# Patient Record
Sex: FEMALE | Race: WHITE | NOT HISPANIC OR LATINO
[De-identification: names, ages, dates, MRNs, and addresses within clinical notes are randomized per-mention and may not be internally consistent; named-entity substitution may affect disease eponyms.]

---

## 2017-10-05 ENCOUNTER — FORM ENCOUNTER (OUTPATIENT)
Age: 67
End: 2017-10-05

## 2017-10-05 ENCOUNTER — APPOINTMENT (OUTPATIENT)
Dept: GASTROENTEROLOGY | Facility: CLINIC | Age: 67
End: 2017-10-05
Payer: MEDICARE

## 2017-10-05 VITALS
DIASTOLIC BLOOD PRESSURE: 80 MMHG | WEIGHT: 127 LBS | BODY MASS INDEX: 23.98 KG/M2 | SYSTOLIC BLOOD PRESSURE: 140 MMHG | HEIGHT: 61 IN | TEMPERATURE: 99.6 F

## 2017-10-05 DIAGNOSIS — Z80.0 FAMILY HISTORY OF MALIGNANT NEOPLASM OF DIGESTIVE ORGANS: ICD-10-CM

## 2017-10-05 DIAGNOSIS — Z87.898 PERSONAL HISTORY OF OTHER SPECIFIED CONDITIONS: ICD-10-CM

## 2017-10-05 DIAGNOSIS — Z87.828 PERSONAL HISTORY OF OTHER (HEALED) PHYSICAL INJURY AND TRAUMA: ICD-10-CM

## 2017-10-05 PROCEDURE — 99202 OFFICE O/P NEW SF 15 MIN: CPT

## 2017-10-06 ENCOUNTER — OUTPATIENT (OUTPATIENT)
Dept: OUTPATIENT SERVICES | Facility: HOSPITAL | Age: 67
LOS: 1 days | End: 2017-10-06
Payer: MEDICARE

## 2017-10-06 ENCOUNTER — APPOINTMENT (OUTPATIENT)
Dept: ULTRASOUND IMAGING | Facility: CLINIC | Age: 67
End: 2017-10-06
Payer: MEDICARE

## 2017-10-06 ENCOUNTER — RESULT REVIEW (OUTPATIENT)
Age: 67
End: 2017-10-06

## 2017-10-06 DIAGNOSIS — R10.84 GENERALIZED ABDOMINAL PAIN: ICD-10-CM

## 2017-10-06 LAB
ALBUMIN SERPL ELPH-MCNC: 3.9 G/DL
ALP BLD-CCNC: 71 U/L
ALT SERPL-CCNC: 6 U/L
AMYLASE/CREAT SERPL: 37 U/L
ANION GAP SERPL CALC-SCNC: 16 MMOL/L
AST SERPL-CCNC: 17 U/L
BILIRUB SERPL-MCNC: 0.4 MG/DL
BUN SERPL-MCNC: 7 MG/DL
CALCIUM SERPL-MCNC: 9.3 MG/DL
CHLORIDE SERPL-SCNC: 98 MMOL/L
CO2 SERPL-SCNC: 24 MMOL/L
CREAT SERPL-MCNC: 0.63 MG/DL
CRP SERPL-MCNC: 5.2 MG/DL
ERYTHROCYTE [SEDIMENTATION RATE] IN BLOOD BY WESTERGREN METHOD: 25 MM/HR
GLIADIN IGA SER QL: 6.5 UNITS
GLIADIN IGG SER QL: <5 UNITS
GLIADIN PEPTIDE IGA SER-ACNC: NEGATIVE
GLIADIN PEPTIDE IGG SER-ACNC: NEGATIVE
GLUCOSE SERPL-MCNC: 82 MG/DL
IGA SER QL IEP: 189 MG/DL
LPL SERPL-CCNC: 9 U/L
POTASSIUM SERPL-SCNC: 4 MMOL/L
PROT SERPL-MCNC: 7.3 G/DL
SODIUM SERPL-SCNC: 138 MMOL/L
TTG IGA SER IA-ACNC: 8.8 UNITS
TTG IGA SER-ACNC: NEGATIVE
TTG IGG SER IA-ACNC: <5 UNITS
TTG IGG SER IA-ACNC: NEGATIVE

## 2017-10-06 PROCEDURE — 76700 US EXAM ABDOM COMPLETE: CPT | Mod: 26

## 2017-10-06 PROCEDURE — 76700 US EXAM ABDOM COMPLETE: CPT

## 2017-10-09 LAB
ENDOMYSIUM IGA SER QL: NEGATIVE
ENDOMYSIUM IGA TITR SER: NORMAL

## 2017-10-12 ENCOUNTER — OTHER (OUTPATIENT)
Age: 67
End: 2017-10-12

## 2017-10-13 ENCOUNTER — OTHER (OUTPATIENT)
Age: 67
End: 2017-10-13

## 2017-10-18 ENCOUNTER — APPOINTMENT (OUTPATIENT)
Dept: GASTROENTEROLOGY | Facility: CLINIC | Age: 67
End: 2017-10-18
Payer: MEDICARE

## 2017-10-18 ENCOUNTER — OTHER (OUTPATIENT)
Age: 67
End: 2017-10-18

## 2017-10-18 VITALS
BODY MASS INDEX: 23.98 KG/M2 | OXYGEN SATURATION: 98 % | SYSTOLIC BLOOD PRESSURE: 122 MMHG | WEIGHT: 127 LBS | HEIGHT: 61 IN | DIASTOLIC BLOOD PRESSURE: 80 MMHG | HEART RATE: 84 BPM

## 2017-10-18 DIAGNOSIS — R79.82 ELEVATED C-REACTIVE PROTEIN (CRP): ICD-10-CM

## 2017-10-18 DIAGNOSIS — K64.4 RESIDUAL HEMORRHOIDAL SKIN TAGS: ICD-10-CM

## 2017-10-18 PROCEDURE — 99214 OFFICE O/P EST MOD 30 MIN: CPT

## 2017-10-18 RX ORDER — ALUMINUM HYDROXIDE AND MAGNESIUM CARBONATE 160; 105 MG/1; MG/1
160-105 TABLET, CHEWABLE ORAL
Qty: 100 | Refills: 5 | Status: DISCONTINUED | COMMUNITY
Start: 2017-10-05 | End: 2017-10-18

## 2017-10-18 RX ORDER — DICYCLOMINE HYDROCHLORIDE 10 MG/1
10 CAPSULE ORAL
Qty: 20 | Refills: 0 | Status: DISCONTINUED | COMMUNITY
Start: 2017-10-02 | End: 2017-10-18

## 2017-10-18 RX ORDER — PRAVASTATIN SODIUM 40 MG/1
40 TABLET ORAL
Refills: 0 | Status: DISCONTINUED | COMMUNITY
End: 2017-10-18

## 2017-10-18 RX ORDER — PRAVASTATIN SODIUM 40 MG/1
40 TABLET ORAL
Qty: 90 | Refills: 0 | Status: DISCONTINUED | COMMUNITY
Start: 2017-05-25 | End: 2017-10-18

## 2017-10-18 RX ORDER — HYDROCORTISONE 25 MG/G
2.5 OINTMENT TOPICAL
Qty: 2835 | Refills: 0 | Status: DISCONTINUED | COMMUNITY
Start: 2017-07-25 | End: 2017-10-18

## 2017-10-19 ENCOUNTER — APPOINTMENT (OUTPATIENT)
Dept: GASTROENTEROLOGY | Facility: AMBULATORY MEDICAL SERVICES | Age: 67
End: 2017-10-19

## 2017-10-23 ENCOUNTER — OTHER (OUTPATIENT)
Age: 67
End: 2017-10-23

## 2017-11-07 ENCOUNTER — OTHER (OUTPATIENT)
Age: 67
End: 2017-11-07

## 2017-11-09 ENCOUNTER — APPOINTMENT (OUTPATIENT)
Dept: GASTROENTEROLOGY | Facility: AMBULATORY MEDICAL SERVICES | Age: 67
End: 2017-11-09

## 2018-08-08 ENCOUNTER — APPOINTMENT (OUTPATIENT)
Dept: GASTROENTEROLOGY | Facility: CLINIC | Age: 68
End: 2018-08-08
Payer: MEDICARE

## 2018-08-08 VITALS
HEART RATE: 66 BPM | WEIGHT: 142 LBS | DIASTOLIC BLOOD PRESSURE: 73 MMHG | HEIGHT: 61 IN | SYSTOLIC BLOOD PRESSURE: 130 MMHG | BODY MASS INDEX: 26.81 KG/M2

## 2018-08-08 PROCEDURE — 99215 OFFICE O/P EST HI 40 MIN: CPT

## 2018-08-09 ENCOUNTER — OTHER (OUTPATIENT)
Age: 68
End: 2018-08-09

## 2018-08-09 ENCOUNTER — APPOINTMENT (OUTPATIENT)
Dept: UROLOGY | Facility: CLINIC | Age: 68
End: 2018-08-09

## 2018-08-09 ENCOUNTER — APPOINTMENT (OUTPATIENT)
Dept: UROLOGY | Facility: CLINIC | Age: 68
End: 2018-08-09
Payer: MEDICARE

## 2018-08-09 DIAGNOSIS — R35.0 FREQUENCY OF MICTURITION: ICD-10-CM

## 2018-08-09 DIAGNOSIS — Z87.440 PERSONAL HISTORY OF URINARY (TRACT) INFECTIONS: ICD-10-CM

## 2018-08-09 LAB
ALBUMIN SERPL ELPH-MCNC: 4.4 G/DL
ALP BLD-CCNC: 74 U/L
ALT SERPL-CCNC: 10 U/L
AMYLASE/CREAT SERPL: 38 U/L
ANION GAP SERPL CALC-SCNC: 19 MMOL/L
AST SERPL-CCNC: 19 U/L
BASOPHILS # BLD AUTO: 0.04 K/UL
BASOPHILS NFR BLD AUTO: 0.4 %
BILIRUB SERPL-MCNC: 0.4 MG/DL
BUN SERPL-MCNC: 9 MG/DL
CALCIUM SERPL-MCNC: 9 MG/DL
CANCER AG19-9 SERPL-ACNC: 21.4 U/ML
CHLORIDE SERPL-SCNC: 92 MMOL/L
CO2 SERPL-SCNC: 21 MMOL/L
CREAT SERPL-MCNC: 0.6 MG/DL
EOSINOPHIL # BLD AUTO: 0.07 K/UL
EOSINOPHIL NFR BLD AUTO: 0.8 %
GLUCOSE SERPL-MCNC: 36 MG/DL
HCT VFR BLD CALC: 39.3 %
HGB BLD-MCNC: 13.1 G/DL
IMM GRANULOCYTES NFR BLD AUTO: 0.1 %
LPL SERPL-CCNC: 10 U/L
LYMPHOCYTES # BLD AUTO: 1.58 K/UL
LYMPHOCYTES NFR BLD AUTO: 17.5 %
MAN DIFF?: NORMAL
MCHC RBC-ENTMCNC: 30.2 PG
MCHC RBC-ENTMCNC: 33.3 GM/DL
MCV RBC AUTO: 90.6 FL
MONOCYTES # BLD AUTO: 0.73 K/UL
MONOCYTES NFR BLD AUTO: 8.1 %
NEUTROPHILS # BLD AUTO: 6.6 K/UL
NEUTROPHILS NFR BLD AUTO: 73.1 %
PLATELET # BLD AUTO: 370 K/UL
POTASSIUM SERPL-SCNC: 4.4 MMOL/L
PROT SERPL-MCNC: 6.6 G/DL
RBC # BLD: 4.34 M/UL
RBC # FLD: 13.4 %
SODIUM SERPL-SCNC: 132 MMOL/L
WBC # FLD AUTO: 9.03 K/UL

## 2018-08-09 PROCEDURE — 99203 OFFICE O/P NEW LOW 30 MIN: CPT | Mod: 25

## 2018-08-09 PROCEDURE — 51798 US URINE CAPACITY MEASURE: CPT

## 2018-08-09 PROCEDURE — 81003 URINALYSIS AUTO W/O SCOPE: CPT | Mod: QW

## 2018-08-10 VITALS
HEIGHT: 61 IN | RESPIRATION RATE: 18 BRPM | BODY MASS INDEX: 25.11 KG/M2 | HEART RATE: 72 BPM | WEIGHT: 133 LBS | DIASTOLIC BLOOD PRESSURE: 70 MMHG | SYSTOLIC BLOOD PRESSURE: 112 MMHG

## 2018-08-13 ENCOUNTER — RX RENEWAL (OUTPATIENT)
Age: 68
End: 2018-08-13

## 2018-08-13 ENCOUNTER — OTHER (OUTPATIENT)
Age: 68
End: 2018-08-13

## 2018-08-13 DIAGNOSIS — F41.9 ANXIETY DISORDER, UNSPECIFIED: ICD-10-CM

## 2018-08-13 LAB — BACTERIA UR CULT: NORMAL

## 2018-08-24 ENCOUNTER — APPOINTMENT (OUTPATIENT)
Dept: GASTROENTEROLOGY | Facility: CLINIC | Age: 68
End: 2018-08-24
Payer: MEDICARE

## 2018-08-24 VITALS
SYSTOLIC BLOOD PRESSURE: 140 MMHG | WEIGHT: 130 LBS | HEART RATE: 68 BPM | DIASTOLIC BLOOD PRESSURE: 80 MMHG | OXYGEN SATURATION: 98 % | TEMPERATURE: 98.5 F | HEIGHT: 61 IN | BODY MASS INDEX: 24.55 KG/M2

## 2018-08-24 DIAGNOSIS — R10.32 LEFT LOWER QUADRANT PAIN: ICD-10-CM

## 2018-08-24 DIAGNOSIS — R11.0 NAUSEA: ICD-10-CM

## 2018-08-24 PROCEDURE — 99214 OFFICE O/P EST MOD 30 MIN: CPT

## 2018-09-28 ENCOUNTER — APPOINTMENT (OUTPATIENT)
Dept: GASTROENTEROLOGY | Facility: HOSPITAL | Age: 68
End: 2018-09-28

## 2018-09-28 ENCOUNTER — OTHER (OUTPATIENT)
Age: 68
End: 2018-09-28

## 2019-01-24 ENCOUNTER — OTHER (OUTPATIENT)
Age: 69
End: 2019-01-24

## 2019-01-25 ENCOUNTER — OTHER (OUTPATIENT)
Age: 69
End: 2019-01-25

## 2019-01-25 ENCOUNTER — APPOINTMENT (OUTPATIENT)
Dept: GASTROENTEROLOGY | Facility: CLINIC | Age: 69
End: 2019-01-25
Payer: MEDICARE

## 2019-01-25 VITALS
BODY MASS INDEX: 25.68 KG/M2 | WEIGHT: 136 LBS | TEMPERATURE: 98.8 F | SYSTOLIC BLOOD PRESSURE: 130 MMHG | HEART RATE: 76 BPM | OXYGEN SATURATION: 98 % | HEIGHT: 61 IN | DIASTOLIC BLOOD PRESSURE: 80 MMHG

## 2019-01-25 DIAGNOSIS — R14.2 ERUCTATION: ICD-10-CM

## 2019-01-25 DIAGNOSIS — R10.84 GENERALIZED ABDOMINAL PAIN: ICD-10-CM

## 2019-01-25 PROBLEM — R10.32 LEFT LOWER QUADRANT PAIN: Status: RESOLVED | Noted: 2018-08-08 | Resolved: 2019-01-25

## 2019-01-25 PROCEDURE — 99214 OFFICE O/P EST MOD 30 MIN: CPT

## 2019-01-25 NOTE — ASSESSMENT
[FreeTextEntry1] : Impression\par \par Lower abdominal discomfort, left more than right lower quadrant\par \par Mild tenderness without re re down or guarding of the left lower quadrant\par \par History of diverticulitis\par \par Chronic constipation is seen she getting worse with hard stools small in size\par \par Abdominal bloating\par \par Limited colonoscopy or upper endoscopy\par \par Suggest\par \par Blood work here today\par \par Augmentin b.i.d.\par \par CAT scan of the abdomen and pelvis with IV and oral contrast\par \par In 6-8 weeks when we'll do upper endoscopy and colonoscopy as previously suggested\par \par Call me any time\par \par Go to the emergency room if needed\par \par Return to the office anytime\par \par Impression\par \par Lower abdominal discomfort, left more than right lower quadrant\par \par Mild tenderness without re re down or guarding of the left lower quadrant\par \par History of diverticulitis\par \par Chronic constipation is seen she getting worse with hard stools small in size\par \par Abdominal bloating\par \par Limited colonoscopy or upper endoscopy\par \par Suggest\par \par Blood work here today\par \par Augmentin b.i.d.\par \par CAT scan of the abdomen and pelvis with IV and oral contrast\par \par In 6-8 weeks when we'll do upper endoscopy and colonoscopy as previously suggested\par \par Call me any time\par \par Go to the emergency room if needed\par \par Return to the office anytime\par \par For now,\par \par Keep your diet very light\par \par Stay well hydrated\par \par Eat starchy starchy foods such as potatoes, rice, pasta\par \par Eat very little meat\par \par It is okay to have chicken and fish\par \par Avoid fresh fruits and vegetables for now\par \par I would suggest taking Augmentin one tablet 2 times a day\par \par Her blood work here today\par \par Schedule upper endoscopy and colonoscopy in about 6-8 weeks\par \par Call me or return anytime sooner as needed

## 2019-01-25 NOTE — CONSULT LETTER
[FreeTextEntry2] : Javad Concepcion DO\par [address] [facility]\[place] [address] \par \par  [FreeTextEntry3] : Pietro Choudhary MD\par

## 2019-01-25 NOTE — REASON FOR VISIT
[FreeTextEntry1] : Lower abdominal discomfort in the left lower quadrant with constipation, previous diverticulitis and fairly chronic bloating

## 2019-01-25 NOTE — PHYSICAL EXAM
[Occult Blood Positive] : stool was negative for occult blood [FreeTextEntry1] : Small external hemorrhoid, nontender, no blood at the 9:00 position, and a

## 2019-01-25 NOTE — HISTORY OF PRESENT ILLNESS
[de-identified] : Dr. Farnsworth takes care of this very pleasant 68-year-old female. She had an episode of blood in about 6 or 8 months ago. She was treated with outpatient Augmentin and did well. She had a CAT scan showing a diverticulitis at that time. There was a mild case with no complication. She never got to do colonoscopy as followup. Normal upper endoscopy. She has recurrent left lower quadrant discomfort more than right lower quadrant discomfort over the last several weeks. There is no fever or chills. She's having progressive constipation. MiraLax doesn't seem to work as well. As no blood per rectum. Her stools are coming out small and hard. There is no thinning of stool a flattening of stool. There's no family history of rectal of colon cancer. She has bloating and discomfort in the epigastric area at times. No fever or chills. No weight loss.

## 2019-01-26 LAB
BASOPHILS # BLD AUTO: 0.02 K/UL
BASOPHILS NFR BLD AUTO: 0.3 %
EOSINOPHIL # BLD AUTO: 0.02 K/UL
EOSINOPHIL NFR BLD AUTO: 0.3 %
FERRITIN SERPL-MCNC: 116 NG/ML
HCT VFR BLD CALC: 39.6 %
HGB BLD-MCNC: 12.9 G/DL
IMM GRANULOCYTES NFR BLD AUTO: 0.3 %
LYMPHOCYTES # BLD AUTO: 1.22 K/UL
LYMPHOCYTES NFR BLD AUTO: 18.7 %
MAN DIFF?: NORMAL
MCHC RBC-ENTMCNC: 29.4 PG
MCHC RBC-ENTMCNC: 32.6 GM/DL
MCV RBC AUTO: 90.2 FL
MONOCYTES # BLD AUTO: 0.36 K/UL
MONOCYTES NFR BLD AUTO: 5.5 %
NEUTROPHILS # BLD AUTO: 4.88 K/UL
NEUTROPHILS NFR BLD AUTO: 74.9 %
PLATELET # BLD AUTO: 366 K/UL
RBC # BLD: 4.39 M/UL
RBC # FLD: 13.8 %
WBC # FLD AUTO: 6.52 K/UL

## 2019-01-28 ENCOUNTER — OTHER (OUTPATIENT)
Age: 69
End: 2019-01-28

## 2019-01-28 LAB
ALBUMIN SERPL ELPH-MCNC: 4.5 G/DL
ALP BLD-CCNC: 72 U/L
ALT SERPL-CCNC: 15 U/L
ANION GAP SERPL CALC-SCNC: 17 MMOL/L
AST SERPL-CCNC: 17 U/L
BILIRUB SERPL-MCNC: 0.4 MG/DL
BUN SERPL-MCNC: 9 MG/DL
CALCIUM SERPL-MCNC: 9.3 MG/DL
CHLORIDE SERPL-SCNC: 101 MMOL/L
CO2 SERPL-SCNC: 20 MMOL/L
CREAT SERPL-MCNC: 0.58 MG/DL
GLUCOSE SERPL-MCNC: 91 MG/DL
IRON SATN MFR SERPL: 20 %
IRON SERPL-MCNC: 71 UG/DL
POTASSIUM SERPL-SCNC: 4.6 MMOL/L
PROT SERPL-MCNC: 6.7 G/DL
SODIUM SERPL-SCNC: 138 MMOL/L
TIBC SERPL-MCNC: 352 UG/DL
UIBC SERPL-MCNC: 281 UG/DL

## 2019-04-04 ENCOUNTER — OTHER (OUTPATIENT)
Age: 69
End: 2019-04-04

## 2019-04-12 ENCOUNTER — APPOINTMENT (OUTPATIENT)
Dept: GASTROENTEROLOGY | Facility: AMBULATORY MEDICAL SERVICES | Age: 69
End: 2019-04-12

## 2019-04-23 ENCOUNTER — OTHER (OUTPATIENT)
Age: 69
End: 2019-04-23

## 2019-04-25 ENCOUNTER — OTHER (OUTPATIENT)
Age: 69
End: 2019-04-25

## 2019-06-03 ENCOUNTER — OTHER (OUTPATIENT)
Age: 69
End: 2019-06-03

## 2019-06-04 ENCOUNTER — APPOINTMENT (OUTPATIENT)
Dept: GASTROENTEROLOGY | Facility: AMBULATORY MEDICAL SERVICES | Age: 69
End: 2019-06-04
Payer: MEDICARE

## 2019-06-04 DIAGNOSIS — K29.00 ACUTE GASTRITIS W/OUT BLEEDING: ICD-10-CM

## 2019-06-04 PROCEDURE — 43239 EGD BIOPSY SINGLE/MULTIPLE: CPT

## 2019-06-04 PROCEDURE — 45378 DIAGNOSTIC COLONOSCOPY: CPT

## 2019-06-04 RX ORDER — AMOXICILLIN AND CLAVULANATE POTASSIUM 875; 125 MG/1; MG/1
875-125 TABLET, COATED ORAL
Qty: 20 | Refills: 0 | Status: DISCONTINUED | COMMUNITY
Start: 2018-08-13 | End: 2019-06-04

## 2019-06-04 RX ORDER — POLYETHYLENE GLYCOL-3350 AND ELECTROLYTES 236; 6.74; 5.86; 2.97; 22.74 G/274.31G; G/274.31G; G/274.31G; G/274.31G; G/274.31G
236 POWDER, FOR SOLUTION ORAL
Refills: 0 | Status: DISCONTINUED | COMMUNITY
End: 2019-06-04

## 2019-06-04 RX ORDER — AMOXICILLIN AND CLAVULANATE POTASSIUM 500; 125 MG/1; MG/1
500-125 TABLET, FILM COATED ORAL
Qty: 6 | Refills: 0 | Status: DISCONTINUED | COMMUNITY
Start: 2019-01-25 | End: 2019-06-04

## 2019-06-04 RX ORDER — ONDANSETRON 4 MG/1
4 TABLET ORAL
Qty: 14 | Refills: 0 | Status: DISCONTINUED | COMMUNITY
Start: 2018-08-24 | End: 2019-06-04

## 2019-06-04 RX ORDER — POLYETHYLENE GLYCOL-3350 AND ELECTROLYTES 236; 6.74; 5.86; 2.97; 22.74 G/274.31G; G/274.31G; G/274.31G; G/274.31G; G/274.31G
236 POWDER, FOR SOLUTION ORAL
Qty: 1 | Refills: 0 | Status: DISCONTINUED | COMMUNITY
Start: 2018-09-10 | End: 2019-06-04

## 2019-06-04 RX ORDER — SODIUM SULFATE, POTASSIUM SULFATE, MAGNESIUM SULFATE 17.5; 3.13; 1.6 G/ML; G/ML; G/ML
17.5-3.13-1.6 SOLUTION, CONCENTRATE ORAL
Qty: 1 | Refills: 0 | Status: DISCONTINUED | COMMUNITY
Start: 2019-01-25 | End: 2019-06-04

## 2019-06-04 RX ORDER — BUSPIRONE HYDROCHLORIDE 5 MG/1
5 TABLET ORAL
Refills: 0 | Status: DISCONTINUED | COMMUNITY
Start: 2018-08-13 | End: 2019-06-04

## 2019-06-04 RX ORDER — SODIUM SULFATE, POTASSIUM SULFATE, MAGNESIUM SULFATE 17.5; 3.13; 1.6 G/ML; G/ML; G/ML
17.5-3.13-1.6 SOLUTION, CONCENTRATE ORAL
Qty: 1 | Refills: 0 | Status: DISCONTINUED | COMMUNITY
Start: 2017-10-18 | End: 2019-06-04

## 2019-06-10 ENCOUNTER — OTHER (OUTPATIENT)
Age: 69
End: 2019-06-10

## 2020-03-27 LAB
ALBUMIN SERPL ELPH-MCNC: 4.5 G/DL
ALP BLD-CCNC: 68 U/L
ALT SERPL-CCNC: 20 U/L
ANION GAP SERPL CALC-SCNC: 13 MMOL/L
AST SERPL-CCNC: 24 U/L
BASOPHILS # BLD AUTO: 0.05 K/UL
BASOPHILS NFR BLD AUTO: 0.6 %
BILIRUB SERPL-MCNC: 0.3 MG/DL
BUN SERPL-MCNC: 15 MG/DL
CALCIUM SERPL-MCNC: 9.8 MG/DL
CHLORIDE SERPL-SCNC: 101 MMOL/L
CO2 SERPL-SCNC: 25 MMOL/L
CREAT SERPL-MCNC: 0.7 MG/DL
EOSINOPHIL # BLD AUTO: 0.07 K/UL
EOSINOPHIL NFR BLD AUTO: 0.8 %
FERRITIN SERPL-MCNC: 125 NG/ML
GLUCOSE SERPL-MCNC: 98 MG/DL
HCT VFR BLD CALC: 46.4 %
HGB BLD-MCNC: 14.5 G/DL
IMM GRANULOCYTES NFR BLD AUTO: 0.2 %
IRON SATN MFR SERPL: 19 %
IRON SERPL-MCNC: 68 UG/DL
LYMPHOCYTES # BLD AUTO: 1.55 K/UL
LYMPHOCYTES NFR BLD AUTO: 18.3 %
MAN DIFF?: NORMAL
MCHC RBC-ENTMCNC: 29.2 PG
MCHC RBC-ENTMCNC: 31.3 GM/DL
MCV RBC AUTO: 93.4 FL
MONOCYTES # BLD AUTO: 0.51 K/UL
MONOCYTES NFR BLD AUTO: 6 %
NEUTROPHILS # BLD AUTO: 6.26 K/UL
NEUTROPHILS NFR BLD AUTO: 74.1 %
PLATELET # BLD AUTO: 466 K/UL
POTASSIUM SERPL-SCNC: 4.6 MMOL/L
PROT SERPL-MCNC: 7.5 G/DL
RBC # BLD: 4.97 M/UL
RBC # FLD: 12.9 %
SODIUM SERPL-SCNC: 139 MMOL/L
TIBC SERPL-MCNC: 367 UG/DL
UIBC SERPL-MCNC: 299 UG/DL
WBC # FLD AUTO: 8.46 K/UL

## 2020-08-03 ENCOUNTER — APPOINTMENT (OUTPATIENT)
Dept: GASTROENTEROLOGY | Facility: CLINIC | Age: 70
End: 2020-08-03
Payer: MEDICARE

## 2020-08-03 VITALS
TEMPERATURE: 98.2 F | SYSTOLIC BLOOD PRESSURE: 120 MMHG | DIASTOLIC BLOOD PRESSURE: 80 MMHG | WEIGHT: 137 LBS | HEIGHT: 61 IN | BODY MASS INDEX: 25.86 KG/M2

## 2020-08-03 DIAGNOSIS — Z87.19 PERSONAL HISTORY OF OTHER DISEASES OF THE DIGESTIVE SYSTEM: ICD-10-CM

## 2020-08-03 PROCEDURE — 99214 OFFICE O/P EST MOD 30 MIN: CPT

## 2020-08-03 RX ORDER — MIRTAZAPINE 15 MG/1
15 TABLET, FILM COATED ORAL
Refills: 0 | Status: DISCONTINUED | COMMUNITY
End: 2020-08-03

## 2020-08-03 RX ORDER — TRAZODONE HYDROCHLORIDE 100 MG/1
100 TABLET ORAL
Qty: 30 | Refills: 0 | Status: ACTIVE | COMMUNITY
Start: 2020-07-28

## 2020-08-03 RX ORDER — METHOCARBAMOL 750 MG/1
750 TABLET, FILM COATED ORAL
Qty: 60 | Refills: 0 | Status: ACTIVE | COMMUNITY
Start: 2020-03-07

## 2020-08-03 RX ORDER — RANITIDINE 300 MG/1
300 TABLET ORAL
Qty: 30 | Refills: 2 | Status: DISCONTINUED | COMMUNITY
Start: 2019-06-04 | End: 2020-08-03

## 2020-08-03 RX ORDER — CIPROFLOXACIN HYDROCHLORIDE 500 MG/1
500 TABLET, FILM COATED ORAL
Qty: 14 | Refills: 0 | Status: DISCONTINUED | COMMUNITY
Start: 2020-03-26 | End: 2020-08-03

## 2020-08-03 RX ORDER — METRONIDAZOLE 250 MG/1
250 TABLET ORAL 3 TIMES DAILY
Qty: 42 | Refills: 0 | Status: DISCONTINUED | COMMUNITY
Start: 2020-03-26 | End: 2020-08-03

## 2020-08-03 NOTE — REASON FOR VISIT
[FreeTextEntry1] : Intermittent left lower quadrant discomfort, history of diverticulitis, chronic constipation, yesterday she thought she saw blood in the stool, diverticular disease on previous colonoscopy a year and a half ago and hemorrhoids

## 2020-08-03 NOTE — PHYSICAL EXAM
[General Appearance - Alert] : alert [General Appearance - In No Acute Distress] : in no acute distress [Sclera] : the sclera and conjunctiva were normal [Neck Appearance] : the appearance of the neck was normal [Jugular Venous Distention Increased] : there was no jugular-venous distention [Neck Cervical Mass (___cm)] : no neck mass was observed [Thyroid Diffuse Enlargement] : the thyroid was not enlarged [Thyroid Nodule] : there were no palpable thyroid nodules [Auscultation Breath Sounds / Voice Sounds] : lungs were clear to auscultation bilaterally [Heart Sounds Gallop] : no gallops [Heart Rate And Rhythm] : heart rate was normal and rhythm regular [Heart Sounds] : normal S1 and S2 [Murmurs] : no murmurs [Heart Sounds Pericardial Friction Rub] : no pericardial rub [Full Pulse] : the pedal pulses are present [Edema] : there was no peripheral edema [Bowel Sounds] : normal bowel sounds [Abdomen Soft] : soft [Abdomen Mass (___ Cm)] : no abdominal mass palpated [Normal Sphincter Tone] : normal sphincter tone [External Hemorrhoid] : external hemorrhoids [No Rectal Mass] : no rectal mass [Occult Blood Positive] : stool was negative for occult blood [FreeTextEntry1] : Small external hemorrhoid, nontender, no blood at the 9:00 position, and a [Cervical Lymph Nodes Enlarged Posterior Bilaterally] : posterior cervical [Cervical Lymph Nodes Enlarged Anterior Bilaterally] : anterior cervical [Supraclavicular Lymph Nodes Enlarged Bilaterally] : supraclavicular [Axillary Lymph Nodes Enlarged Bilaterally] : axillary [Femoral Lymph Nodes Enlarged Bilaterally] : femoral [Inguinal Lymph Nodes Enlarged Bilaterally] : inguinal [No CVA Tenderness] : no ~M costovertebral angle tenderness [No Spinal Tenderness] : no spinal tenderness [Nail Clubbing] : no clubbing  or cyanosis of the fingernails [Abnormal Walk] : normal gait [Musculoskeletal - Swelling] : no joint swelling seen [Motor Tone] : muscle strength and tone were normal [Skin Turgor] : normal skin turgor [Skin Color & Pigmentation] : normal skin color and pigmentation [No Focal Deficits] : no focal deficits [] : no rash [Impaired Insight] : insight and judgment were intact [Oriented To Time, Place, And Person] : oriented to person, place, and time [Affect] : the affect was normal

## 2020-08-03 NOTE — HISTORY OF PRESENT ILLNESS
[de-identified] : Javad Concepcion, DO\par [address] [facility]\[place] [address] \par \par Pleasant 69-year-old female history of diverticulitis in the past\par \par Last colonoscopy June of last year, diverticular disease and hemorrhoids and a small polyp removed\par \par Last CAT scan was negative for diverticulitis over a year ago\par \par Patient with intermittent left lower quadrant discomfort, most recently several days ago, mostly located to the left lower quadrant but sometimes more diffuse\par \par No fever or chills\par \par She saw blood in her stool yesterday\par \par And she is been having chronic constipation\par \par Heartburn seems to be well controlled on diet alone at this time\par

## 2020-08-03 NOTE — CONSULT LETTER
[Dear  ___] : Dear  [unfilled], [Consult Letter:] : I had the pleasure of evaluating your patient, [unfilled]. [Please see my note below.] : Please see my note below. [Consult Closing:] : Thank you very much for allowing me to participate in the care of this patient.  If you have any questions, please do not hesitate to contact me. [Sincerely,] : Sincerely, [FreeTextEntry2] : Javad Concepcion DO\par [address] [facility]\[place] [address] \par  [FreeTextEntry3] : Pietro Choudhary MD\par

## 2020-08-03 NOTE — ASSESSMENT
[FreeTextEntry1] : Impression,\par \par History of diverticulitis,\par \par Intermittent abdominal discomfort, sometimes located to the left lower quadrant\par \par Last episode several days ago\par \par Today she does not complain of any discomfort and on physical examination it was rather unimpressive except for very minimal discomfort of the left lower quadrant only on deep palpation without rebound or guarding\par \par Complaint of blood per rectum small amount on the tissue paper yesterday\par \par Rectal examination today negative\par \par Colonoscopy a year and a half ago diverticular disease and small hemorrhoid and an isolated polyp\par \par GERD well-controlled with diet alone\par \par Suggest,\par \par Blood work here today\par \par CAT scan of the abdomen pelvis with IV and oral contrast \par \par Follow-up after above\par \par MiraLAX on a daily basis for constipation\par \par Call her anytime sooner as needed

## 2020-08-04 LAB
ALBUMIN SERPL ELPH-MCNC: 4.4 G/DL
ALP BLD-CCNC: 55 U/L
ALT SERPL-CCNC: 15 U/L
ANION GAP SERPL CALC-SCNC: 12 MMOL/L
AST SERPL-CCNC: 15 U/L
BASOPHILS # BLD AUTO: 0.06 K/UL
BASOPHILS NFR BLD AUTO: 0.9 %
BILIRUB SERPL-MCNC: <0.2 MG/DL
BUN SERPL-MCNC: 20 MG/DL
CALCIUM SERPL-MCNC: 9.3 MG/DL
CHLORIDE SERPL-SCNC: 107 MMOL/L
CO2 SERPL-SCNC: 24 MMOL/L
CREAT SERPL-MCNC: 0.62 MG/DL
EOSINOPHIL # BLD AUTO: 0.09 K/UL
EOSINOPHIL NFR BLD AUTO: 1.3 %
FERRITIN SERPL-MCNC: 85 NG/ML
GLUCOSE SERPL-MCNC: 113 MG/DL
HCT VFR BLD CALC: 40 %
HGB BLD-MCNC: 13 G/DL
IMM GRANULOCYTES NFR BLD AUTO: 0.3 %
IRON SATN MFR SERPL: 16 %
IRON SERPL-MCNC: 51 UG/DL
LPL SERPL-CCNC: 12 U/L
LYMPHOCYTES # BLD AUTO: 1.21 K/UL
LYMPHOCYTES NFR BLD AUTO: 17.3 %
MAN DIFF?: NORMAL
MCHC RBC-ENTMCNC: 29.7 PG
MCHC RBC-ENTMCNC: 32.5 GM/DL
MCV RBC AUTO: 91.5 FL
MONOCYTES # BLD AUTO: 0.41 K/UL
MONOCYTES NFR BLD AUTO: 5.8 %
NEUTROPHILS # BLD AUTO: 5.22 K/UL
NEUTROPHILS NFR BLD AUTO: 74.4 %
PLATELET # BLD AUTO: 394 K/UL
POTASSIUM SERPL-SCNC: 4.4 MMOL/L
PROT SERPL-MCNC: 6.4 G/DL
RBC # BLD: 4.37 M/UL
RBC # FLD: 13.2 %
SODIUM SERPL-SCNC: 142 MMOL/L
TIBC SERPL-MCNC: 310 UG/DL
UIBC SERPL-MCNC: 259 UG/DL
WBC # FLD AUTO: 7.01 K/UL

## 2020-12-16 PROBLEM — Z87.440 HISTORY OF URINARY TRACT INFECTION: Status: RESOLVED | Noted: 2018-08-09 | Resolved: 2020-12-16

## 2021-09-22 ENCOUNTER — APPOINTMENT (OUTPATIENT)
Dept: GASTROENTEROLOGY | Facility: CLINIC | Age: 71
End: 2021-09-22
Payer: MEDICARE

## 2021-09-22 VITALS
DIASTOLIC BLOOD PRESSURE: 75 MMHG | HEIGHT: 61 IN | HEART RATE: 76 BPM | SYSTOLIC BLOOD PRESSURE: 134 MMHG | OXYGEN SATURATION: 96 % | WEIGHT: 141 LBS | BODY MASS INDEX: 26.62 KG/M2

## 2021-09-22 PROCEDURE — 99213 OFFICE O/P EST LOW 20 MIN: CPT

## 2021-09-22 NOTE — PHYSICAL EXAM
[General Appearance - Alert] : alert [General Appearance - In No Acute Distress] : in no acute distress [Sclera] : the sclera and conjunctiva were normal [Neck Appearance] : the appearance of the neck was normal [Neck Cervical Mass (___cm)] : no neck mass was observed [Jugular Venous Distention Increased] : there was no jugular-venous distention [Thyroid Nodule] : there were no palpable thyroid nodules [Thyroid Diffuse Enlargement] : the thyroid was not enlarged [Auscultation Breath Sounds / Voice Sounds] : lungs were clear to auscultation bilaterally [Heart Rate And Rhythm] : heart rate was normal and rhythm regular [Heart Sounds] : normal S1 and S2 [Heart Sounds Gallop] : no gallops [Murmurs] : no murmurs [Heart Sounds Pericardial Friction Rub] : no pericardial rub [Full Pulse] : the pedal pulses are present [Edema] : there was no peripheral edema [Bowel Sounds] : normal bowel sounds [Abdomen Soft] : soft [Abdomen Mass (___ Cm)] : no abdominal mass palpated [Normal Sphincter Tone] : normal sphincter tone [No Rectal Mass] : no rectal mass [External Hemorrhoid] : external hemorrhoids [Occult Blood Positive] : stool was negative for occult blood [FreeTextEntry1] : Small external hemorrhoid, nontender, no blood at the 9:00 position, and a [Cervical Lymph Nodes Enlarged Posterior Bilaterally] : posterior cervical [Cervical Lymph Nodes Enlarged Anterior Bilaterally] : anterior cervical [Supraclavicular Lymph Nodes Enlarged Bilaterally] : supraclavicular [Axillary Lymph Nodes Enlarged Bilaterally] : axillary [Femoral Lymph Nodes Enlarged Bilaterally] : femoral [Inguinal Lymph Nodes Enlarged Bilaterally] : inguinal [No CVA Tenderness] : no ~M costovertebral angle tenderness [No Spinal Tenderness] : no spinal tenderness [Abnormal Walk] : normal gait [Nail Clubbing] : no clubbing  or cyanosis of the fingernails [Musculoskeletal - Swelling] : no joint swelling seen [Motor Tone] : muscle strength and tone were normal [Skin Color & Pigmentation] : normal skin color and pigmentation [Skin Turgor] : normal skin turgor [] : no rash [No Focal Deficits] : no focal deficits [Oriented To Time, Place, And Person] : oriented to person, place, and time [Impaired Insight] : insight and judgment were intact [Affect] : the affect was normal

## 2021-09-22 NOTE — CONSULT LETTER
[Dear  ___] : Dear  [unfilled], [Consult Letter:] : I had the pleasure of evaluating your patient, [unfilled]. [Please see my note below.] : Please see my note below. [Consult Closing:] : Thank you very much for allowing me to participate in the care of this patient.  If you have any questions, please do not hesitate to contact me. [Sincerely,] : Sincerely, [FreeTextEntry2] : Javad Concepcion DO\par [address] [facility]\par [place] [id number]  [FreeTextEntry3] : Pietro Choudhary MD\par

## 2021-09-22 NOTE — ASSESSMENT
[FreeTextEntry1] : Impression\par \par Left lower quadrant discomfort\par \par Worse over the last few days with stabilization today\par \par Possible diverticulitis or other pathology\par \par Suggest\par \par Lab work\par \par CAT scan\par \par Cipro 500 mg p.o. twice daily\par \par The patient would like to wait on all of the above for another day or 2\par \par She can call, go to urgent visit or go to emergency room\par \par She is aware that I will be away for 2 weeks starting September 26\par \par She can speak with or see any of my covering physicians\par \par I had like to see her on my return

## 2021-09-22 NOTE — HISTORY OF PRESENT ILLNESS
[de-identified] : Dr. Leticia Concepcion takes care of this 70-year-old female, pleasant\par \par Left lower quadrant for 3 or more days\par \par No fever or chills\par \par No anorexia, nausea vomiting, weight loss\par \par No change in bowel habits\par \par Symptoms seem to have gotten worse but now stabilizing\par \par

## 2021-10-18 ENCOUNTER — NON-APPOINTMENT (OUTPATIENT)
Age: 71
End: 2021-10-18

## 2021-10-18 LAB
ALBUMIN SERPL ELPH-MCNC: 4.3 G/DL
ALP BLD-CCNC: 63 U/L
ALT SERPL-CCNC: 16 U/L
ANION GAP SERPL CALC-SCNC: 13 MMOL/L
AST SERPL-CCNC: 20 U/L
BASOPHILS # BLD AUTO: 0.06 K/UL
BASOPHILS NFR BLD AUTO: 0.8 %
BILIRUB SERPL-MCNC: 0.3 MG/DL
BUN SERPL-MCNC: 14 MG/DL
CALCIUM SERPL-MCNC: 9 MG/DL
CHLORIDE SERPL-SCNC: 102 MMOL/L
CO2 SERPL-SCNC: 20 MMOL/L
CREAT SERPL-MCNC: 0.53 MG/DL
EOSINOPHIL # BLD AUTO: 0.1 K/UL
EOSINOPHIL NFR BLD AUTO: 1.3 %
FERRITIN SERPL-MCNC: 121 NG/ML
GLUCOSE SERPL-MCNC: 97 MG/DL
HCT VFR BLD CALC: 40.5 %
HGB BLD-MCNC: 13.5 G/DL
IMM GRANULOCYTES NFR BLD AUTO: 0.3 %
IRON SATN MFR SERPL: 23 %
IRON SERPL-MCNC: 82 UG/DL
LPL SERPL-CCNC: 33 U/L
LYMPHOCYTES # BLD AUTO: 1.34 K/UL
LYMPHOCYTES NFR BLD AUTO: 17.6 %
MAN DIFF?: NORMAL
MCHC RBC-ENTMCNC: 30.3 PG
MCHC RBC-ENTMCNC: 33.3 GM/DL
MCV RBC AUTO: 90.8 FL
MONOCYTES # BLD AUTO: 0.58 K/UL
MONOCYTES NFR BLD AUTO: 7.6 %
NEUTROPHILS # BLD AUTO: 5.51 K/UL
NEUTROPHILS NFR BLD AUTO: 72.4 %
PLATELET # BLD AUTO: 332 K/UL
POTASSIUM SERPL-SCNC: 4.5 MMOL/L
PROT SERPL-MCNC: 6.4 G/DL
RBC # BLD: 4.46 M/UL
RBC # FLD: 13.5 %
SODIUM SERPL-SCNC: 135 MMOL/L
TIBC SERPL-MCNC: 354 UG/DL
UIBC SERPL-MCNC: 273 UG/DL
WBC # FLD AUTO: 7.61 K/UL

## 2021-10-19 ENCOUNTER — NON-APPOINTMENT (OUTPATIENT)
Age: 71
End: 2021-10-19

## 2022-03-13 ENCOUNTER — NON-APPOINTMENT (OUTPATIENT)
Age: 72
End: 2022-03-13

## 2022-03-14 ENCOUNTER — APPOINTMENT (OUTPATIENT)
Dept: GASTROENTEROLOGY | Facility: CLINIC | Age: 72
End: 2022-03-14
Payer: MEDICARE

## 2022-03-14 VITALS
DIASTOLIC BLOOD PRESSURE: 85 MMHG | SYSTOLIC BLOOD PRESSURE: 158 MMHG | BODY MASS INDEX: 27.19 KG/M2 | HEIGHT: 61 IN | HEART RATE: 78 BPM | OXYGEN SATURATION: 98 % | WEIGHT: 144 LBS | TEMPERATURE: 97.8 F

## 2022-03-14 DIAGNOSIS — R10.32 LEFT LOWER QUADRANT PAIN: ICD-10-CM

## 2022-03-14 DIAGNOSIS — R10.814 LEFT LOWER QUADRANT ABDOMINAL TENDERNESS: ICD-10-CM

## 2022-03-14 PROCEDURE — 99214 OFFICE O/P EST MOD 30 MIN: CPT

## 2022-03-14 NOTE — REASON FOR VISIT
[FreeTextEntry1] : Left lower quadrant discomfort in a patient has had mild diverticulitis several times

## 2022-03-14 NOTE — HISTORY OF PRESENT ILLNESS
[de-identified] : Dr. Javad Concepcion takes care of this pleasant 71-year-old female\par \par Colonoscopy 3 years ago, diverticular disease and small polyp removed\par \par She has had on review of previous CAT scans from 2018 and 19 mild diverticulitis at least 2 times\par \par She returns with left lower quadrant discomfort\par \par No radiation\par \par No fever or chills\par \par Discomforts been there for several days\par \par No change in bowel movements\par \par No anorexia, nausea, vomiting or weight loss

## 2022-03-14 NOTE — ASSESSMENT
[FreeTextEntry1] : Impression\par \par History of diverticulitis at least twice\par \par Diverticular disease and polyp on colonoscopy 3 years ago\par \par Recurrent left lower quadrant discomfort without rebound\par \par Suggest\par \par Cipro 500 mg 2 times a day for a week\par \par Lab work here today\par \par CAT scan of the abdomen pelvis with IV and oral contrast\par \par Surgical consultation\par \par Low residue diet for now\par \par Later high-fiber diet\par \par Call me anytime\par \par Follow-up in 1 week\par \par Go to emergency room if needed

## 2022-03-14 NOTE — CONSULT LETTER
[Dear  ___] : Dear  [unfilled], [Consult Letter:] : I had the pleasure of evaluating your patient, [unfilled]. [Please see my note below.] : Please see my note below. [Consult Closing:] : Thank you very much for allowing me to participate in the care of this patient.  If you have any questions, please do not hesitate to contact me. [Sincerely,] : Sincerely, [FreeTextEntry3] : Pietro Choudhary MD\par  [FreeTextEntry2] : Javad Concepcion DO\par [address] [facility]\par [place] [id number]

## 2022-03-14 NOTE — PHYSICAL EXAM
[General Appearance - Alert] : alert [General Appearance - In No Acute Distress] : in no acute distress [Sclera] : the sclera and conjunctiva were normal [Neck Appearance] : the appearance of the neck was normal [Neck Cervical Mass (___cm)] : no neck mass was observed [Jugular Venous Distention Increased] : there was no jugular-venous distention [Thyroid Diffuse Enlargement] : the thyroid was not enlarged [Thyroid Nodule] : there were no palpable thyroid nodules [Auscultation Breath Sounds / Voice Sounds] : lungs were clear to auscultation bilaterally [Heart Rate And Rhythm] : heart rate was normal and rhythm regular [Heart Sounds] : normal S1 and S2 [Heart Sounds Gallop] : no gallops [Murmurs] : no murmurs [Heart Sounds Pericardial Friction Rub] : no pericardial rub [Full Pulse] : the pedal pulses are present [Edema] : there was no peripheral edema [Bowel Sounds] : normal bowel sounds [Abdomen Soft] : soft [Abdomen Mass (___ Cm)] : no abdominal mass palpated [Normal Sphincter Tone] : normal sphincter tone [No Rectal Mass] : no rectal mass [External Hemorrhoid] : external hemorrhoids [Occult Blood Positive] : stool was negative for occult blood [FreeTextEntry1] : Small external hemorrhoid, nontender, no blood at the 9:00 position, and a [Cervical Lymph Nodes Enlarged Posterior Bilaterally] : posterior cervical [Cervical Lymph Nodes Enlarged Anterior Bilaterally] : anterior cervical [Supraclavicular Lymph Nodes Enlarged Bilaterally] : supraclavicular [Axillary Lymph Nodes Enlarged Bilaterally] : axillary [Femoral Lymph Nodes Enlarged Bilaterally] : femoral [Inguinal Lymph Nodes Enlarged Bilaterally] : inguinal [No CVA Tenderness] : no ~M costovertebral angle tenderness [No Spinal Tenderness] : no spinal tenderness [Abnormal Walk] : normal gait [Nail Clubbing] : no clubbing  or cyanosis of the fingernails [Musculoskeletal - Swelling] : no joint swelling seen [Motor Tone] : muscle strength and tone were normal [Skin Color & Pigmentation] : normal skin color and pigmentation [Skin Turgor] : normal skin turgor [] : no rash [No Focal Deficits] : no focal deficits [Oriented To Time, Place, And Person] : oriented to person, place, and time [Impaired Insight] : insight and judgment were intact [Affect] : the affect was normal

## 2022-03-15 ENCOUNTER — NON-APPOINTMENT (OUTPATIENT)
Age: 72
End: 2022-03-15

## 2022-03-15 LAB
ALBUMIN SERPL ELPH-MCNC: 4.4 G/DL
ALP BLD-CCNC: 73 U/L
ALT SERPL-CCNC: 17 U/L
ANION GAP SERPL CALC-SCNC: 14 MMOL/L
AST SERPL-CCNC: 16 U/L
BASOPHILS # BLD AUTO: 0.05 K/UL
BASOPHILS NFR BLD AUTO: 0.5 %
BILIRUB SERPL-MCNC: 0.2 MG/DL
BUN SERPL-MCNC: 11 MG/DL
CALCIUM SERPL-MCNC: 9 MG/DL
CHLORIDE SERPL-SCNC: 99 MMOL/L
CO2 SERPL-SCNC: 22 MMOL/L
CREAT SERPL-MCNC: 0.64 MG/DL
EGFR: 94 ML/MIN/1.73M2
EOSINOPHIL # BLD AUTO: 0.05 K/UL
EOSINOPHIL NFR BLD AUTO: 0.5 %
FERRITIN SERPL-MCNC: 127 NG/ML
GLUCOSE SERPL-MCNC: 106 MG/DL
HCT VFR BLD CALC: 42.6 %
HGB BLD-MCNC: 13.6 G/DL
IMM GRANULOCYTES NFR BLD AUTO: 0.4 %
IRON SATN MFR SERPL: 7 %
IRON SERPL-MCNC: 24 UG/DL
LYMPHOCYTES # BLD AUTO: 1.51 K/UL
LYMPHOCYTES NFR BLD AUTO: 14 %
MAN DIFF?: NORMAL
MCHC RBC-ENTMCNC: 29.4 PG
MCHC RBC-ENTMCNC: 31.9 GM/DL
MCV RBC AUTO: 92.2 FL
MONOCYTES # BLD AUTO: 0.74 K/UL
MONOCYTES NFR BLD AUTO: 6.9 %
NEUTROPHILS # BLD AUTO: 8.41 K/UL
NEUTROPHILS NFR BLD AUTO: 77.7 %
PLATELET # BLD AUTO: 302 K/UL
POTASSIUM SERPL-SCNC: 4.7 MMOL/L
PROT SERPL-MCNC: 6.5 G/DL
RBC # BLD: 4.62 M/UL
RBC # FLD: 13.5 %
SODIUM SERPL-SCNC: 135 MMOL/L
TIBC SERPL-MCNC: 342 UG/DL
UIBC SERPL-MCNC: 319 UG/DL
WBC # FLD AUTO: 10.8 K/UL

## 2022-03-16 ENCOUNTER — NON-APPOINTMENT (OUTPATIENT)
Age: 72
End: 2022-03-16

## 2022-03-16 DIAGNOSIS — D50.0 IRON DEFICIENCY ANEMIA SECONDARY TO BLOOD LOSS (CHRONIC): ICD-10-CM

## 2022-03-21 ENCOUNTER — NON-APPOINTMENT (OUTPATIENT)
Age: 72
End: 2022-03-21

## 2022-03-29 ENCOUNTER — APPOINTMENT (OUTPATIENT)
Dept: SURGERY | Facility: CLINIC | Age: 72
End: 2022-03-29
Payer: MEDICARE

## 2022-03-29 VITALS
TEMPERATURE: 97.2 F | BODY MASS INDEX: 27.56 KG/M2 | HEIGHT: 61 IN | HEART RATE: 76 BPM | DIASTOLIC BLOOD PRESSURE: 84 MMHG | WEIGHT: 146 LBS | SYSTOLIC BLOOD PRESSURE: 167 MMHG | OXYGEN SATURATION: 98 %

## 2022-03-29 DIAGNOSIS — K44.9 DIAPHRAGMATIC HERNIA W/OUT OBSTRUCTION OR GANGRENE: ICD-10-CM

## 2022-03-29 PROCEDURE — 99205 OFFICE O/P NEW HI 60 MIN: CPT

## 2022-03-30 PROBLEM — K44.9 HIATAL HERNIA: Status: ACTIVE | Noted: 2022-03-30

## 2022-03-30 NOTE — ASSESSMENT
[FreeTextEntry1] : 71-year-old woman presenting at the request of her gastroenterologist for surgical evaluation given her extensive history of recurrent sigmoid diverticulitis.  Most recent episode in early March.  CT scan performed at [facility] on March 15th, demonstrating uncomplicated mid sigmoid diverticulitis, described as focally inflamed mid sigmoid colon with stranding of the pericolonic fat felt on the basis of diverticulitis without perforation abscess or bowel obstruction.  (Also of note, small hiatal hernia, partially calcified ovoid 13 x 9 mm nodule in the region of the left apical pericardial fat which may represent old calcified lymph node.)\par \par Pt should undergo repeat colonoscopy in 6-8 weeks upon resolution of acute inflammatory process as recommended by GI.  She reports previous colonoscopy approx 3 years ago which was unremarkable with the exception of diverticulosis.\par \par The patient presented today accompanied by her daughter.  I discussed with them the radiographic findings and discussed at length treatment and management of diverticulitis including sigmoid resection with primary anastomosis versus sigmoid resection with colostomy.  Given the patient's extensive diverticular disease extending almost the entirety of the left colon in her case she would likely benefit from a left hemicolectomy +/-colostomy.\par \par The patient is already taking oral antibiotics as prescribed by her gastroenterologist (Cipro).\par \par Signs and symptoms of worsening diverticulitis and/or perforation have been discussed should such symptoms present the patient should contact my office immediately and/or bring themselves to the nearest emergency room as hospital admission, IV antibiotics and potentially urgent or emergent surgery may be required.\par \par Also offered consideration for [place] as second opinion.\par \par F/u with me following Colonoscopy.\par \par Hiatal hernia small and asymptomatic.  No surgical intervention indicated.
IV discontinued, cath removed intact

## 2022-03-30 NOTE — PHYSICAL EXAM
[Normal Breath Sounds] : Normal breath sounds [Normal Heart Sounds] : normal heart sounds [Normal Rate and Rhythm] : normal rate and rhythm [No Rash or Lesion] : No rash or lesion [Alert] : alert [Oriented to Person] : oriented to person [Oriented to Place] : oriented to place [Oriented to Time] : oriented to time [Anxious] : anxious [de-identified] : Healthy appearing woman in no distress [de-identified] : ANGELITA FLOOD EOMI [de-identified] : Soft, nondistended, positive bowel sounds in all four quads.  No hernia or masses. No rebound or guarding.  Minimal tenderness in LLQ.\par  [de-identified] : Ambulating without difficulty or assistance.

## 2022-03-30 NOTE — HISTORY OF PRESENT ILLNESS
[de-identified] : Multiple recurrent bouts of sigmoid diverticulitis.  Referred by Dr Pietro Choudhary (GI)\par Most recent episode 2 weeks ago.  CT as outpt at  radiology.\par Pt states she has completed a 10 day course of antibiotics.\par Feels constipated.  BM daily but small hard stools.

## 2022-04-05 ENCOUNTER — NON-APPOINTMENT (OUTPATIENT)
Age: 72
End: 2022-04-05

## 2022-04-05 ENCOUNTER — EMERGENCY (EMERGENCY)
Facility: HOSPITAL | Age: 72
LOS: 1 days | Discharge: HOME | End: 2022-04-05
Attending: EMERGENCY MEDICINE | Admitting: EMERGENCY MEDICINE
Payer: MEDICARE

## 2022-04-05 VITALS
TEMPERATURE: 98 F | OXYGEN SATURATION: 98 % | DIASTOLIC BLOOD PRESSURE: 82 MMHG | SYSTOLIC BLOOD PRESSURE: 160 MMHG | RESPIRATION RATE: 18 BRPM | HEART RATE: 89 BPM

## 2022-04-05 VITALS
DIASTOLIC BLOOD PRESSURE: 66 MMHG | RESPIRATION RATE: 18 BRPM | SYSTOLIC BLOOD PRESSURE: 166 MMHG | HEART RATE: 78 BPM | OXYGEN SATURATION: 97 % | TEMPERATURE: 98 F | WEIGHT: 145.95 LBS

## 2022-04-05 DIAGNOSIS — Z11.52 ENCOUNTER FOR SCREENING FOR COVID-19: ICD-10-CM

## 2022-04-05 DIAGNOSIS — Z01.818 ENCOUNTER FOR OTHER PREPROCEDURAL EXAMINATION: ICD-10-CM

## 2022-04-05 LAB
ALBUMIN SERPL ELPH-MCNC: 3.5 G/DL — SIGNIFICANT CHANGE UP (ref 3.3–5)
ALP SERPL-CCNC: 57 U/L — SIGNIFICANT CHANGE UP (ref 40–120)
ALT FLD-CCNC: 21 U/L — SIGNIFICANT CHANGE UP (ref 12–78)
ANION GAP SERPL CALC-SCNC: 10 MMOL/L — SIGNIFICANT CHANGE UP (ref 5–17)
APPEARANCE UR: CLEAR — SIGNIFICANT CHANGE UP
APTT BLD: 34.8 SEC — SIGNIFICANT CHANGE UP (ref 27.5–35.5)
AST SERPL-CCNC: 29 U/L — SIGNIFICANT CHANGE UP (ref 15–37)
BASOPHILS # BLD AUTO: 0.05 K/UL — SIGNIFICANT CHANGE UP (ref 0–0.2)
BASOPHILS NFR BLD AUTO: 0.5 % — SIGNIFICANT CHANGE UP (ref 0–2)
BILIRUB SERPL-MCNC: 0.4 MG/DL — SIGNIFICANT CHANGE UP (ref 0.2–1.2)
BILIRUB UR-MCNC: NEGATIVE — SIGNIFICANT CHANGE UP
BUN SERPL-MCNC: 11 MG/DL — SIGNIFICANT CHANGE UP (ref 7–23)
CALCIUM SERPL-MCNC: 9.3 MG/DL — SIGNIFICANT CHANGE UP (ref 8.5–10.1)
CHLORIDE SERPL-SCNC: 108 MMOL/L — SIGNIFICANT CHANGE UP (ref 96–108)
CO2 SERPL-SCNC: 21 MMOL/L — LOW (ref 22–31)
COLOR SPEC: SIGNIFICANT CHANGE UP
CREAT SERPL-MCNC: 0.62 MG/DL — SIGNIFICANT CHANGE UP (ref 0.5–1.3)
DIFF PNL FLD: NEGATIVE — SIGNIFICANT CHANGE UP
EGFR: 95 ML/MIN/1.73M2 — SIGNIFICANT CHANGE UP
EOSINOPHIL # BLD AUTO: 0.06 K/UL — SIGNIFICANT CHANGE UP (ref 0–0.5)
EOSINOPHIL NFR BLD AUTO: 0.6 % — SIGNIFICANT CHANGE UP (ref 0–6)
GLUCOSE SERPL-MCNC: 73 MG/DL — SIGNIFICANT CHANGE UP (ref 70–99)
GLUCOSE UR QL: NEGATIVE — SIGNIFICANT CHANGE UP
HCT VFR BLD CALC: 41.9 % — SIGNIFICANT CHANGE UP (ref 34.5–45)
HGB BLD-MCNC: 14.3 G/DL — SIGNIFICANT CHANGE UP (ref 11.5–15.5)
IMM GRANULOCYTES NFR BLD AUTO: 0.3 % — SIGNIFICANT CHANGE UP (ref 0–1.5)
INR BLD: 1.21 RATIO — HIGH (ref 0.88–1.16)
KETONES UR-MCNC: ABNORMAL
LACTATE SERPL-SCNC: 0.5 MMOL/L — LOW (ref 0.7–2)
LEUKOCYTE ESTERASE UR-ACNC: ABNORMAL
LIDOCAIN IGE QN: 32 U/L — LOW (ref 73–393)
LYMPHOCYTES # BLD AUTO: 1.31 K/UL — SIGNIFICANT CHANGE UP (ref 1–3.3)
LYMPHOCYTES # BLD AUTO: 13.4 % — SIGNIFICANT CHANGE UP (ref 13–44)
MCHC RBC-ENTMCNC: 30.3 PG — SIGNIFICANT CHANGE UP (ref 27–34)
MCHC RBC-ENTMCNC: 34.1 GM/DL — SIGNIFICANT CHANGE UP (ref 32–36)
MCV RBC AUTO: 88.8 FL — SIGNIFICANT CHANGE UP (ref 80–100)
MONOCYTES # BLD AUTO: 0.55 K/UL — SIGNIFICANT CHANGE UP (ref 0–0.9)
MONOCYTES NFR BLD AUTO: 5.6 % — SIGNIFICANT CHANGE UP (ref 2–14)
NEUTROPHILS # BLD AUTO: 7.77 K/UL — HIGH (ref 1.8–7.4)
NEUTROPHILS NFR BLD AUTO: 79.6 % — HIGH (ref 43–77)
NITRITE UR-MCNC: NEGATIVE — SIGNIFICANT CHANGE UP
NRBC # BLD: 0 /100 WBCS — SIGNIFICANT CHANGE UP (ref 0–0)
PH UR: 6 — SIGNIFICANT CHANGE UP (ref 5–8)
PLATELET # BLD AUTO: 362 K/UL — SIGNIFICANT CHANGE UP (ref 150–400)
POTASSIUM SERPL-MCNC: 4.2 MMOL/L — SIGNIFICANT CHANGE UP (ref 3.5–5.3)
POTASSIUM SERPL-SCNC: 4.2 MMOL/L — SIGNIFICANT CHANGE UP (ref 3.5–5.3)
PROT SERPL-MCNC: 7 G/DL — SIGNIFICANT CHANGE UP (ref 6–8.3)
PROT UR-MCNC: NEGATIVE — SIGNIFICANT CHANGE UP
PROTHROM AB SERPL-ACNC: 14.2 SEC — HIGH (ref 10.5–13.4)
RBC # BLD: 4.72 M/UL — SIGNIFICANT CHANGE UP (ref 3.8–5.2)
RBC # FLD: 13 % — SIGNIFICANT CHANGE UP (ref 10.3–14.5)
SARS-COV-2 RNA SPEC QL NAA+PROBE: SIGNIFICANT CHANGE UP
SODIUM SERPL-SCNC: 139 MMOL/L — SIGNIFICANT CHANGE UP (ref 135–145)
SP GR SPEC: 1.01 — SIGNIFICANT CHANGE UP (ref 1.01–1.02)
UROBILINOGEN FLD QL: NEGATIVE — SIGNIFICANT CHANGE UP
WBC # BLD: 9.77 K/UL — SIGNIFICANT CHANGE UP (ref 3.8–10.5)
WBC # FLD AUTO: 9.77 K/UL — SIGNIFICANT CHANGE UP (ref 3.8–10.5)

## 2022-04-05 PROCEDURE — 85025 COMPLETE CBC W/AUTO DIFF WBC: CPT

## 2022-04-05 PROCEDURE — 81001 URINALYSIS AUTO W/SCOPE: CPT

## 2022-04-05 PROCEDURE — 86900 BLOOD TYPING SEROLOGIC ABO: CPT

## 2022-04-05 PROCEDURE — G1004: CPT

## 2022-04-05 PROCEDURE — 99284 EMERGENCY DEPT VISIT MOD MDM: CPT | Mod: 25

## 2022-04-05 PROCEDURE — 74177 CT ABD & PELVIS W/CONTRAST: CPT | Mod: 26,MG

## 2022-04-05 PROCEDURE — 83690 ASSAY OF LIPASE: CPT

## 2022-04-05 PROCEDURE — 80053 COMPREHEN METABOLIC PANEL: CPT

## 2022-04-05 PROCEDURE — 83605 ASSAY OF LACTIC ACID: CPT

## 2022-04-05 PROCEDURE — 86901 BLOOD TYPING SEROLOGIC RH(D): CPT

## 2022-04-05 PROCEDURE — 85730 THROMBOPLASTIN TIME PARTIAL: CPT

## 2022-04-05 PROCEDURE — 85610 PROTHROMBIN TIME: CPT

## 2022-04-05 PROCEDURE — 36415 COLL VENOUS BLD VENIPUNCTURE: CPT

## 2022-04-05 PROCEDURE — 87086 URINE CULTURE/COLONY COUNT: CPT

## 2022-04-05 PROCEDURE — 74177 CT ABD & PELVIS W/CONTRAST: CPT | Mod: MG

## 2022-04-05 PROCEDURE — 87635 SARS-COV-2 COVID-19 AMP PRB: CPT

## 2022-04-05 PROCEDURE — 99284 EMERGENCY DEPT VISIT MOD MDM: CPT | Mod: FS

## 2022-04-05 PROCEDURE — 96375 TX/PRO/DX INJ NEW DRUG ADDON: CPT

## 2022-04-05 PROCEDURE — 86850 RBC ANTIBODY SCREEN: CPT

## 2022-04-05 PROCEDURE — 96374 THER/PROPH/DIAG INJ IV PUSH: CPT | Mod: XU

## 2022-04-05 RX ORDER — METRONIDAZOLE 500 MG
1 TABLET ORAL
Qty: 30 | Refills: 0
Start: 2022-04-05 | End: 2022-04-14

## 2022-04-05 RX ORDER — HYDRALAZINE HCL 50 MG
10 TABLET ORAL ONCE
Refills: 0 | Status: COMPLETED | OUTPATIENT
Start: 2022-04-05 | End: 2022-04-05

## 2022-04-05 RX ORDER — METRONIDAZOLE 500 MG
500 TABLET ORAL ONCE
Refills: 0 | Status: COMPLETED | OUTPATIENT
Start: 2022-04-05 | End: 2022-04-05

## 2022-04-05 RX ORDER — CIPROFLOXACIN LACTATE 400MG/40ML
500 VIAL (ML) INTRAVENOUS ONCE
Refills: 0 | Status: COMPLETED | OUTPATIENT
Start: 2022-04-05 | End: 2022-04-05

## 2022-04-05 RX ORDER — ATORVASTATIN CALCIUM 80 MG/1
1 TABLET, FILM COATED ORAL
Qty: 0 | Refills: 0 | DISCHARGE

## 2022-04-05 RX ORDER — METRONIDAZOLE 500 MG
500 TABLET ORAL ONCE
Refills: 0 | Status: DISCONTINUED | OUTPATIENT
Start: 2022-04-05 | End: 2022-04-05

## 2022-04-05 RX ORDER — CIPROFLOXACIN LACTATE 400MG/40ML
400 VIAL (ML) INTRAVENOUS ONCE
Refills: 0 | Status: DISCONTINUED | OUTPATIENT
Start: 2022-04-05 | End: 2022-04-05

## 2022-04-05 RX ORDER — MOXIFLOXACIN HYDROCHLORIDE TABLETS, 400 MG 400 MG/1
1 TABLET, FILM COATED ORAL
Qty: 20 | Refills: 0
Start: 2022-04-05 | End: 2022-04-14

## 2022-04-05 RX ORDER — SODIUM CHLORIDE 9 MG/ML
1000 INJECTION INTRAMUSCULAR; INTRAVENOUS; SUBCUTANEOUS ONCE
Refills: 0 | Status: COMPLETED | OUTPATIENT
Start: 2022-04-05 | End: 2022-04-05

## 2022-04-05 RX ORDER — TRAZODONE HCL 50 MG
0 TABLET ORAL
Qty: 0 | Refills: 0 | DISCHARGE

## 2022-04-05 RX ORDER — ONDANSETRON 8 MG/1
4 TABLET, FILM COATED ORAL ONCE
Refills: 0 | Status: COMPLETED | OUTPATIENT
Start: 2022-04-05 | End: 2022-04-05

## 2022-04-05 RX ORDER — MORPHINE SULFATE 50 MG/1
4 CAPSULE, EXTENDED RELEASE ORAL ONCE
Refills: 0 | Status: DISCONTINUED | OUTPATIENT
Start: 2022-04-05 | End: 2022-04-05

## 2022-04-05 RX ORDER — IOHEXOL 300 MG/ML
30 INJECTION, SOLUTION INTRAVENOUS ONCE
Refills: 0 | Status: COMPLETED | OUTPATIENT
Start: 2022-04-05 | End: 2022-04-05

## 2022-04-05 RX ADMIN — IOHEXOL 30 MILLILITER(S): 300 INJECTION, SOLUTION INTRAVENOUS at 16:30

## 2022-04-05 RX ADMIN — Medication 10 MILLIGRAM(S): at 21:47

## 2022-04-05 RX ADMIN — SODIUM CHLORIDE 1000 MILLILITER(S): 9 INJECTION INTRAMUSCULAR; INTRAVENOUS; SUBCUTANEOUS at 16:11

## 2022-04-05 RX ADMIN — Medication 500 MILLIGRAM(S): at 20:37

## 2022-04-05 RX ADMIN — MORPHINE SULFATE 4 MILLIGRAM(S): 50 CAPSULE, EXTENDED RELEASE ORAL at 16:13

## 2022-04-05 RX ADMIN — ONDANSETRON 4 MILLIGRAM(S): 8 TABLET, FILM COATED ORAL at 16:11

## 2022-04-05 NOTE — ED PROVIDER NOTE - CLINICAL SUMMARY MEDICAL DECISION MAKING FREE TEXT BOX
72 y/o F with c/o worsening LLQ abd pain associated with decreased BMs.  pt with hx of constipation.  CT, labs r/o diverticulitis, UTI, constipation, obstruction

## 2022-04-05 NOTE — ED PROVIDER NOTE - OBJECTIVE STATEMENT
71 F hx sigmoid diverticulitis, hld, copd c/o llq pain worse this AM. Reports flare of her diverticulitis last month, treated with abx. Has been constipated x 1 month, only small BMs. Has been following with Dr. Choudhary and after last CT scan showed "diseased colon" she was referred to Dr. Olivo who she saw last week, was told will need to have surgery to remove colon. Denies fever, cp, sob, vomiting, urinary complaints.

## 2022-04-05 NOTE — ED ADULT NURSE REASSESSMENT NOTE - NS ED NURSE REASSESS COMMENT FT1
1600:  iv access difficult to obtain.  RN able to get a 20g rfa and a 20g left wrist.  not all labs collected (still need pink top, cbc and blue top).  MD made aware.  will give fluids and attempt butterfly again.  lynne irizarry.

## 2022-04-05 NOTE — ED PROVIDER NOTE - ATTENDING CONTRIBUTION TO CARE
70 y/o F with c/o worsening LLQ abd pain associated with decreased BMs.  pt with hx of constipation    PE: well appearing no distress  MMM  heart/lungs wnl  abd soft TTP LLQ    labs, CT, UA r/o constipation, diverticulitis, obstruction

## 2022-04-05 NOTE — ED PROVIDER NOTE - PROGRESS NOTE DETAILS
Reevaluated patient at bedside.  Patient feeling well. Discussed the results of all diagnostic testing in ED and copies of all available reports given. Has follow up tomorrow with Dr. Choudhary. Advised to f/u with Dr. Olivo as well.   An opportunity to ask questions was provided.  Discussed the importance of prompt, close medical follow-up.  Patient will return with any changes, concerns or persistent/worsening symptoms.  Understanding of all instructions verbalized. elev BP, denies acute complaints, encouraged to f/u with PMD this week for BP control

## 2022-04-05 NOTE — ED ADULT NURSE NOTE - OBJECTIVE STATEMENT
the patient presents to the ed with complaints of pain to [facility].  she states that she has a history of diverticulitis and this is a flare up.  she states that she has not had a (significant) bowel movement in over a month.  she states that she had an appt with surgery (at this facility) and was told that the entire colon is diseased and needs to be removed.  she is alert / oriented x4.  PMH significant for Diverticulitis, Hyperlipidemia.    Denies any significant past surgical procedures.  Family at bedside.  Awaiting evaluation.  lynne irizarry.

## 2022-04-05 NOTE — ED PROVIDER NOTE - NS ED ATTENDING STATEMENT MOD
This was a shared visit with the AVRIL. I reviewed and verified the documentation and independently performed the documented:

## 2022-04-05 NOTE — ED ADULT NURSE REASSESSMENT NOTE - NS ED NURSE REASSESS COMMENT FT1
1721:  patient doing well with PO contrast.  almost done.  sitting upright and talking to daughter who remains at bedside.  some relief from morphine.  lynne irizarry.

## 2022-04-05 NOTE — ED PROVIDER NOTE - PATIENT PORTAL LINK FT
You can access the FollowMyHealth Patient Portal offered by [facility] by registering at the following website: [email or web address]. By joining [facility]’s FollowMyHealth portal, you will also be able to view your health information using other applications (apps) compatible with our system.

## 2022-04-05 NOTE — ED PROVIDER NOTE - NSFOLLOWUPINSTRUCTIONS_ED_ALL_ED_FT
Constipation, Adult      Constipation is when a person has fewer than three bowel movements in a week, has difficulty having a bowel movement, or has stools (feces) that are dry, hard, or larger than normal. Constipation may be caused by an underlying condition. It may become worse with age if a person takes certain medicines and does not take in enough fluids.      Follow these instructions at home:      Eating and drinking      •Eat foods that have a lot of fiber, such as beans, whole grains, and fresh fruits and vegetables.      •Limit foods that are low in fiber and high in fat and processed sugars, such as fried or sweet foods. These include french fries, hamburgers, cookies, candies, and soda.      •Drink enough fluid to keep your urine pale yellow.      General instructions     •Exercise regularly or as told by your health care provider. Try to do 150 minutes of moderate exercise each week.      •Use the bathroom when you have the urge to go. Do not hold it in.      •Take over-the-counter and prescription medicines only as told by your health care provider. This includes any fiber supplements.    •During bowel movements:   •Practice deep breathing while relaxing the lower abdomen.      •Practice pelvic floor relaxation.        •Watch your condition for any changes. Let your health care provider know about them.      •Keep all follow-up visits as told by your health care provider. This is important.        Contact a health care provider if:    •You have pain that gets worse.      •You have a fever.      •You do not have a bowel movement after 4 days.      •You vomit.      •You are not hungry or you lose weight.      •You are bleeding from the opening between the buttocks (anus).      •You have thin, pencil-like stools.        Get help right away if:    •You have a fever and your symptoms suddenly get worse.      •You leak stool or have blood in your stool.      •Your abdomen is bloated.      •You have severe pain in your abdomen.      •You feel dizzy or you faint.        Summary    •Constipation is when a person has fewer than three bowel movements in a week, has difficulty having a bowel movement, or has stools (feces) that are dry, hard, or larger than normal.      •Eat foods that have a lot of fiber, such as beans, whole grains, and fresh fruits and vegetables.      •Drink enough fluid to keep your urine pale yellow.      •Take over-the-counter and prescription medicines only as told by your health care provider. This includes any fiber supplements.      This information is not intended to replace advice given to you by your health care provider. Make sure you discuss any questions you have with your health care provider.

## 2022-04-05 NOTE — ED PROVIDER NOTE - NSICDXPASTMEDICALHX_GEN_ALL_CORE_FT
PAST MEDICAL HISTORY:  COPD (chronic obstructive pulmonary disease)     HLD (hyperlipidemia)     Sigmoid diverticulitis

## 2022-04-06 ENCOUNTER — APPOINTMENT (OUTPATIENT)
Dept: GASTROENTEROLOGY | Facility: CLINIC | Age: 72
End: 2022-04-06
Payer: MEDICARE

## 2022-04-06 VITALS
HEIGHT: 61 IN | DIASTOLIC BLOOD PRESSURE: 84 MMHG | WEIGHT: 146 LBS | SYSTOLIC BLOOD PRESSURE: 157 MMHG | BODY MASS INDEX: 27.56 KG/M2 | HEART RATE: 70 BPM | OXYGEN SATURATION: 98 %

## 2022-04-06 LAB
CULTURE RESULTS: SIGNIFICANT CHANGE UP
SPECIMEN SOURCE: SIGNIFICANT CHANGE UP

## 2022-04-06 PROCEDURE — 99214 OFFICE O/P EST MOD 30 MIN: CPT

## 2022-04-06 NOTE — HISTORY OF PRESENT ILLNESS
[de-identified] : Javad Concepcion, DO\par [address] [facility]\[place] [address] \par \par Pleasant 71-year-old female\par \par Several episodes of diverticulitis\par \par Very recent episode\par \par She calls several times complaining of persistent abdominal discomfort and constipation\par \par Sent to ER yesterday\par \par CAT scan showing mild diverticulitis without fluid collections or abscess\par \par Lab work, CBC was normal\par \par Seen by surgeon who advised elective hemicolectomy\par \par No fever or chills\par \par Constipation\par \par Has not tried MiraLAX with any regularity\par \par `

## 2022-04-06 NOTE — CONSULT LETTER
[Dear  ___] : Dear  [unfilled], [Consult Letter:] : I had the pleasure of evaluating your patient, [unfilled]. [Please see my note below.] : Please see my note below. [Consult Closing:] : Thank you very much for allowing me to participate in the care of this patient.  If you have any questions, please do not hesitate to contact me. [Sincerely,] : Sincerely, [FreeTextEntry2] : Javad Concepcion DO\par [address] [facility]\par [place] [id number]  [FreeTextEntry3] : Pietro Choudhary MD\par

## 2022-04-06 NOTE — PHYSICAL EXAM
[General Appearance - Alert] : alert [General Appearance - In No Acute Distress] : in no acute distress [Sclera] : the sclera and conjunctiva were normal [Neck Appearance] : the appearance of the neck was normal [Neck Cervical Mass (___cm)] : no neck mass was observed [Jugular Venous Distention Increased] : there was no jugular-venous distention [Thyroid Diffuse Enlargement] : the thyroid was not enlarged [Thyroid Nodule] : there were no palpable thyroid nodules [Auscultation Breath Sounds / Voice Sounds] : lungs were clear to auscultation bilaterally [Heart Rate And Rhythm] : heart rate was normal and rhythm regular [Heart Sounds] : normal S1 and S2 [Heart Sounds Gallop] : no gallops [Murmurs] : no murmurs [Heart Sounds Pericardial Friction Rub] : no pericardial rub [Full Pulse] : the pedal pulses are present [Edema] : there was no peripheral edema [Bowel Sounds] : normal bowel sounds [Abdomen Soft] : soft [Abdomen Tenderness] : non-tender [Abdomen Mass (___ Cm)] : no abdominal mass palpated [Normal Sphincter Tone] : normal sphincter tone [No Rectal Mass] : no rectal mass [External Hemorrhoid] : external hemorrhoids [Occult Blood Positive] : stool was negative for occult blood [FreeTextEntry1] : Small external hemorrhoid, nontender, no blood at the 9:00 position, little stool, no impaction [Cervical Lymph Nodes Enlarged Posterior Bilaterally] : posterior cervical [Cervical Lymph Nodes Enlarged Anterior Bilaterally] : anterior cervical [Supraclavicular Lymph Nodes Enlarged Bilaterally] : supraclavicular [Axillary Lymph Nodes Enlarged Bilaterally] : axillary [Femoral Lymph Nodes Enlarged Bilaterally] : femoral [Inguinal Lymph Nodes Enlarged Bilaterally] : inguinal [No CVA Tenderness] : no ~M costovertebral angle tenderness [No Spinal Tenderness] : no spinal tenderness [Abnormal Walk] : normal gait [Nail Clubbing] : no clubbing  or cyanosis of the fingernails [Musculoskeletal - Swelling] : no joint swelling seen [Motor Tone] : muscle strength and tone were normal [Skin Color & Pigmentation] : normal skin color and pigmentation [Skin Turgor] : normal skin turgor [] : no rash [No Focal Deficits] : no focal deficits [Oriented To Time, Place, And Person] : oriented to person, place, and time [Impaired Insight] : insight and judgment were intact [Affect] : the affect was normal

## 2022-04-06 NOTE — ASSESSMENT
[FreeTextEntry1] : Impression\par \par CAT scan yesterday shows very mild persistent diverticulitis, without fluid collection or abscess\par \par History of diverticulitis on several occasions\par \par Seen by surgeon who would like to perform hemicolectomy\par \par Constipation\par \par Iron deficiency anemia\par \par Suggest\par \par Cipro 500 mg 2 times a day for 10 days\par \par Flagyl 250 mg 3 times a day for 10 days\par \par Low residue diet\par \par MiraLAX\par \par She can take 1 dose of milk of magnesia 15 cc to try to help with bowel movement now\par \par She will follow-up with me next week\par \par She can call me or return anytime\par \par She can follow-up with the surgeon\par \par She can go to emergency room if needed\par \par Upper endoscopy and colonoscopy date has to be changed, she had scheduled it for 2 weeks, I have explained that it needs to be at least 4 weeks to allow for diverticulitis to heal\par \par Office follow-up with me next week\par \par Call or return anytime

## 2022-04-08 PROBLEM — K57.32 DIVERTICULITIS OF LARGE INTESTINE WITHOUT PERFORATION OR ABSCESS WITHOUT BLEEDING: Chronic | Status: ACTIVE | Noted: 2022-04-05

## 2022-04-08 PROBLEM — E78.5 HYPERLIPIDEMIA, UNSPECIFIED: Chronic | Status: ACTIVE | Noted: 2022-04-05

## 2022-04-08 PROBLEM — J44.9 CHRONIC OBSTRUCTIVE PULMONARY DISEASE, UNSPECIFIED: Chronic | Status: ACTIVE | Noted: 2022-04-05

## 2022-04-13 ENCOUNTER — APPOINTMENT (OUTPATIENT)
Dept: GASTROENTEROLOGY | Facility: CLINIC | Age: 72
End: 2022-04-13
Payer: MEDICARE

## 2022-04-13 VITALS
OXYGEN SATURATION: 98 % | WEIGHT: 146 LBS | HEIGHT: 61 IN | HEART RATE: 84 BPM | DIASTOLIC BLOOD PRESSURE: 82 MMHG | SYSTOLIC BLOOD PRESSURE: 150 MMHG | BODY MASS INDEX: 27.56 KG/M2

## 2022-04-13 PROCEDURE — 99213 OFFICE O/P EST LOW 20 MIN: CPT

## 2022-04-13 RX ORDER — CIPROFLOXACIN HYDROCHLORIDE 500 MG/1
500 TABLET, FILM COATED ORAL
Qty: 6 | Refills: 0 | Status: DISCONTINUED | COMMUNITY
Start: 2021-09-22 | End: 2022-04-13

## 2022-04-13 RX ORDER — METRONIDAZOLE 250 MG/1
250 TABLET ORAL
Qty: 10 | Refills: 0 | Status: DISCONTINUED | COMMUNITY
Start: 2022-04-06 | End: 2022-04-13

## 2022-04-13 RX ORDER — CIPROFLOXACIN HYDROCHLORIDE 500 MG/1
500 TABLET, FILM COATED ORAL
Qty: 14 | Refills: 0 | Status: DISCONTINUED | COMMUNITY
Start: 2022-04-06 | End: 2022-04-13

## 2022-04-13 NOTE — HISTORY OF PRESENT ILLNESS
[de-identified] : Dr. Javad Concepcion takes care of this pleasant 71-year-old female\par \par Clinically diverticulitis has resolved\par \par She is finished antibiotics\par \par No pain or fever\par \par Some occasional mild constipation generally well controlled with MiraLAX on a daily basis and occasional small dose of milk of magnesia\par \par She is now eating a healthy balanced diet including fresh fruits and vegetables and fiber\par \par She has been seen by surgery\par \par She has had at least 4-5 episodes of diverticulitis in the last several years\par \par All treated conservatively as an outpatient\par \par She has iron deficiency anemia\par \par She is scheduled for both upper endoscopy and colonoscopy May 19\par \par

## 2022-04-13 NOTE — PHYSICAL EXAM
[General Appearance - Alert] : alert [General Appearance - In No Acute Distress] : in no acute distress [Sclera] : the sclera and conjunctiva were normal [Neck Appearance] : the appearance of the neck was normal [Neck Cervical Mass (___cm)] : no neck mass was observed [Thyroid Diffuse Enlargement] : the thyroid was not enlarged [Jugular Venous Distention Increased] : there was no jugular-venous distention [Thyroid Nodule] : there were no palpable thyroid nodules [Auscultation Breath Sounds / Voice Sounds] : lungs were clear to auscultation bilaterally [Heart Rate And Rhythm] : heart rate was normal and rhythm regular [Heart Sounds] : normal S1 and S2 [Heart Sounds Gallop] : no gallops [Murmurs] : no murmurs [Heart Sounds Pericardial Friction Rub] : no pericardial rub [Full Pulse] : the pedal pulses are present [Edema] : there was no peripheral edema [Bowel Sounds] : normal bowel sounds [Abdomen Soft] : soft [Abdomen Tenderness] : non-tender [Abdomen Mass (___ Cm)] : no abdominal mass palpated [Normal Sphincter Tone] : normal sphincter tone [No Rectal Mass] : no rectal mass [External Hemorrhoid] : external hemorrhoids [Occult Blood Positive] : stool was negative for occult blood [FreeTextEntry1] : Small external hemorrhoid, nontender, no blood at the 9:00 position, little stool, no impaction [Cervical Lymph Nodes Enlarged Anterior Bilaterally] : anterior cervical [Cervical Lymph Nodes Enlarged Posterior Bilaterally] : posterior cervical [Axillary Lymph Nodes Enlarged Bilaterally] : axillary [Supraclavicular Lymph Nodes Enlarged Bilaterally] : supraclavicular [Femoral Lymph Nodes Enlarged Bilaterally] : femoral [Inguinal Lymph Nodes Enlarged Bilaterally] : inguinal [No CVA Tenderness] : no ~M costovertebral angle tenderness [No Spinal Tenderness] : no spinal tenderness [Abnormal Walk] : normal gait [Nail Clubbing] : no clubbing  or cyanosis of the fingernails [Musculoskeletal - Swelling] : no joint swelling seen [Motor Tone] : muscle strength and tone were normal [Skin Color & Pigmentation] : normal skin color and pigmentation [Skin Turgor] : normal skin turgor [] : no rash [No Focal Deficits] : no focal deficits [Oriented To Time, Place, And Person] : oriented to person, place, and time [Impaired Insight] : insight and judgment were intact [Affect] : the affect was normal

## 2022-04-13 NOTE — ASSESSMENT
[FreeTextEntry1] : Impression\par \par Clinically resolved diverticulitis\par \par Recurrent diverticulitis over the years\par \par Seen by surgery\par \par Iron deficiency anemia\par \par Mild constipation better with MiraLAX and occasional milk of magnesia\par \par Suggest\par \par Continue MiraLAX and occasional milk of magnesia\par \par Healthy high-fiber diet\par \par Keep appointment for both upper endoscopy and colonoscopy\par \par Suprep\par \par Call or return anytime sooner as needed\par \par She has my cell phone number\par \par Risks/benefits:\par The procedure, the risks and benefits and alternatives have been reviewed in great detail with the patient.  Risks including, but not limited to sedation such as cardiac and pulmonary compromise, the procedure itself such as bleeding requiring hospitalization, transfusion, surgery, temporary or permanent colostomy.  Perforation or puncture of the requiring hospitalization, surgery, temporary colostomy.\par It has been explained to the patient that though colonoscopy is thought to be the best screening exam for colon cancer and polyps, no screening exam can find all colon polyps or cancers.  \par The patient expresses understanding of the procedure and consents to undergoing the procedure.\par \par

## 2022-04-13 NOTE — CONSULT LETTER
[Dear  ___] : Dear  [unfilled], [Consult Letter:] : I had the pleasure of evaluating your patient, [unfilled]. [Please see my note below.] : Please see my note below. [Consult Closing:] : Thank you very much for allowing me to participate in the care of this patient.  If you have any questions, please do not hesitate to contact me. [Sincerely,] : Sincerely, [FreeTextEntry2] : Javad Concepcion DO\par [address] [facility]\[place] [address] \par  [FreeTextEntry3] : Pietro Choudhary MD\par

## 2022-05-10 ENCOUNTER — NON-APPOINTMENT (OUTPATIENT)
Age: 72
End: 2022-05-10

## 2022-05-12 ENCOUNTER — NON-APPOINTMENT (OUTPATIENT)
Age: 72
End: 2022-05-12

## 2022-05-12 ENCOUNTER — APPOINTMENT (OUTPATIENT)
Dept: COLORECTAL SURGERY | Facility: CLINIC | Age: 72
End: 2022-05-12
Payer: MEDICARE

## 2022-05-12 ENCOUNTER — APPOINTMENT (OUTPATIENT)
Dept: BARIATRICS | Facility: CLINIC | Age: 72
End: 2022-05-12
Payer: MEDICARE

## 2022-05-12 VITALS
TEMPERATURE: 97 F | BODY MASS INDEX: 27.22 KG/M2 | SYSTOLIC BLOOD PRESSURE: 140 MMHG | DIASTOLIC BLOOD PRESSURE: 84 MMHG | HEART RATE: 84 BPM | OXYGEN SATURATION: 97 % | WEIGHT: 144.18 LBS | HEIGHT: 61 IN

## 2022-05-12 VITALS
OXYGEN SATURATION: 96 % | BODY MASS INDEX: 25.76 KG/M2 | SYSTOLIC BLOOD PRESSURE: 143 MMHG | HEART RATE: 90 BPM | HEIGHT: 62 IN | WEIGHT: 140 LBS | DIASTOLIC BLOOD PRESSURE: 58 MMHG

## 2022-05-12 DIAGNOSIS — R19.5 OTHER FECAL ABNORMALITIES: ICD-10-CM

## 2022-05-12 DIAGNOSIS — R19.8 OTHER SPECIFIED SYMPTOMS AND SIGNS INVOLVING THE DIGESTIVE SYSTEM AND ABDOMEN: ICD-10-CM

## 2022-05-12 PROCEDURE — 99214 OFFICE O/P EST MOD 30 MIN: CPT

## 2022-05-12 PROCEDURE — 99203 OFFICE O/P NEW LOW 30 MIN: CPT

## 2022-05-12 RX ORDER — ALPRAZOLAM 0.5 MG/1
0.5 TABLET ORAL
Qty: 90 | Refills: 0 | Status: DISCONTINUED | COMMUNITY
Start: 2017-09-11 | End: 2022-05-12

## 2022-05-12 NOTE — PHYSICAL EXAM
[Normal Breath Sounds] : Normal breath sounds [Normal Heart Sounds] : normal heart sounds [Normal Rate and Rhythm] : normal rate and rhythm [No Rash or Lesion] : No rash or lesion [Alert] : alert [Oriented to Person] : oriented to person [Oriented to Place] : oriented to place [Oriented to Time] : oriented to time [Anxious] : anxious [de-identified] : Healthy appearing woman in no distress [de-identified] : ANGELITA FLOOD EOMI [de-identified] : Soft, nondistended, positive bowel sounds in all four quads.  No hernia or masses. No rebound or guarding.  Minimal tenderness in LLQ.\par  [de-identified] : Ambulating without difficulty or assistance.

## 2022-05-12 NOTE — HISTORY OF PRESENT ILLNESS
[de-identified] : 71-year-old woman known to me from previous evaluation for sigmoid diverticulitis.  Patient was scheduled to undergo an outpatient colonoscopy with Dr. Choudhary only to develop recurrent left lower quadrant abdominal pain for which he underwent an outpatient CT scan suggesting recurrent sigmoid diverticulitis.  Colonoscopy has been deferred and the patient was sent to follow-up with me having been placed on oral antibiotics.

## 2022-05-12 NOTE — CONSULT LETTER
[Dear  ___] : Dear  [unfilled], [Consult Letter:] : I had the pleasure of evaluating your patient, [unfilled]. [Please see my note below.] : Please see my note below. [Consult Closing:] : Thank you very much for allowing me to participate in the care of this patient.  If you have any questions, please do not hesitate to contact me. [Sincerely,] : Sincerely, [FreeTextEntry3] : Pamela Melvin MD\par Colon and Rectal Surgery\par [facility]

## 2022-05-12 NOTE — HISTORY OF PRESENT ILLNESS
[FreeTextEntry1] : 71yoF with apparently smoldering diverticulitis for the last two months.  She has on and off been on antibiotics since March.  She has seen surgeon Dr. Darryn Olivo for evaluation and presents to my office today for a second opinion.  She had a CT performed an [facility] in March that showed uncomplicated diverticulitis.  She had another CT scan performed a few days ago that again showed mild uncomplicated diverticulitis.  She still has some LLQ discomfort.  Denies fevers.  She was restarted on antibiotics (Cipro/Flagyl) by her GI Dr. Choudhary after underoging this most recent CT.

## 2022-05-12 NOTE — ASSESSMENT
[FreeTextEntry1] : Recurrent left lower quadrant abdominal pain, decreased bowel function.  Patient reports symptoms of constipation for which she takes milk of magnesia.  She states MiraLAX was unhelpful.\par \par Patient's recent CT scan performed in [facility] has been reviewed via the web portal.  Radiologist reports appreciated suggestive of uncomplicated recurrent sigmoid diverticulitis.  This was compared to the previous CT scan performed at [facility].  Once again there is demonstrated uncomplicated sigmoid diverticulitis.\par \par Although there appears to be minimal pericolonic changes around the sigmoid rectal junction this appears to be somewhat improved from most recent previous imaging.  There is no abscess or extraluminal collections.  Patient is diverticulosis remains quite extensive from the rectosigmoid junction to at least the distal transverse colon.\par \par I have once again reaffirmed course of treatment inclusion completion of oral antibiotics until resolution of the diverticulitis such that the patient may undergo colonoscopy for completion surveillance and to rule out any underlying pathology or malignancy.\par \par I have offered the patient second opinion with our colorectal surgeons.  Contact information provided.\par \par f/u with me following colonoscopy should she wish for me to perform her colectomy.\par \par Signs and symptoms of worsening diverticulitis or complications thereof discussed and all questions answered.\par

## 2022-05-12 NOTE — PHYSICAL EXAM
[JVD] : no jugular venous distention  [Respiratory Effort] : normal respiratory effort [Normal Rate and Rhythm] : normal rate and rhythm [No Rash or Lesion] : No rash or lesion [Alert] : alert [Oriented to Person] : oriented to person [Oriented to Place] : oriented to place [Oriented to Time] : oriented to time [Calm] : calm [de-identified] : Soft, mildly tender to deep palpation in LLQ, no rebound or guarding [de-identified] : No acute distress [de-identified] : Normocephalic, atraumatic [de-identified] : Moves all extremities

## 2022-05-13 ENCOUNTER — NON-APPOINTMENT (OUTPATIENT)
Age: 72
End: 2022-05-13

## 2022-05-17 ENCOUNTER — APPOINTMENT (OUTPATIENT)
Dept: INTERNAL MEDICINE | Facility: CLINIC | Age: 72
End: 2022-05-17
Payer: MEDICARE

## 2022-05-17 ENCOUNTER — APPOINTMENT (OUTPATIENT)
Dept: SURGERY | Facility: CLINIC | Age: 72
End: 2022-05-17

## 2022-05-17 VITALS
HEIGHT: 62 IN | WEIGHT: 140 LBS | BODY MASS INDEX: 25.76 KG/M2 | DIASTOLIC BLOOD PRESSURE: 78 MMHG | SYSTOLIC BLOOD PRESSURE: 160 MMHG

## 2022-05-17 DIAGNOSIS — K59.09 OTHER CONSTIPATION: ICD-10-CM

## 2022-05-17 PROCEDURE — 99205 OFFICE O/P NEW HI 60 MIN: CPT

## 2022-05-17 RX ORDER — METRONIDAZOLE 250 MG/1
250 TABLET ORAL
Qty: 10 | Refills: 0 | Status: COMPLETED | COMMUNITY
Start: 2022-05-10 | End: 2022-05-17

## 2022-05-17 RX ORDER — CIPROFLOXACIN HYDROCHLORIDE 500 MG/1
500 TABLET, FILM COATED ORAL TWICE DAILY
Qty: 20 | Refills: 0 | Status: COMPLETED | COMMUNITY
Start: 2022-05-10 | End: 2022-05-17

## 2022-05-17 NOTE — CONSULT LETTER
[Dear  ___] : Dear  [unfilled], [Consult Letter:] : I had the pleasure of evaluating your patient, [unfilled]. [Please see my note below.] : Please see my note below. [Consult Closing:] : Thank you very much for allowing me to participate in the care of this patient.  If you have any questions, please do not hesitate to contact me. [Sincerely,] : Sincerely, [FreeTextEntry3] : Igor Masterson MD FACP\par Diplomates American Board of Internal Medicine and Infectious Diseases\par NPP Infectious diseases and Internal medicine [facility] [DrJacek  ___] : Dr. FERNANDO

## 2022-05-17 NOTE — HISTORY OF PRESENT ILLNESS
[FreeTextEntry1] : \par Patient is a 71-year-old woman who was referred here for consideration of intravenous antibiotics for recurrent diverticulitis.  Patient states she has had multiple bouts of diverticulitis in the past but never hospitalized.  Her current illness dates to March 2022 when she had lower abdominal discomfort had a CAT scan that showed mid sigmoid diverticulitis mild without phlegmon or abscess and she was treated with Cipro Flagyl for 10 days.  Patient states her symptoms persisted but was complaining more of chronic constipation and difficulty with firm hard stool.  She had no bouts of fever or chills\par Patient's symptoms recurred April 5 went to the emergency room where again she had minimal diverticulitis in a different area again treated with Cipro and Flagyl with resolution.\par Patient again had another episode in the beginning of May that showed mild sigmoid diverticulitis but it in a different location in the sigmoid colon.  Again there was no phlegmon or abscess but significant diverticulosis.  White count was normal and she is currently on oral antibiotics.\par Her major complaint is not pain but significant constipation\par Patient is not complaining of lower abdominal pain but is having mild nausea from metronidazole.

## 2022-05-17 NOTE — ASSESSMENT
[FreeTextEntry1] : After careful review of patient's history chart notes and multiple CAT scans I do not think the patient requires a course of intravenous antibiotics.  I believe she is having recurrent bouts of diverticulitis in different areas of the colon due to a combination of significant diverticulosis and moderately to severe chronic constipation.\par All 3 CAT scans had mild disease without phlegmon and unclear if each 1 required antibiotics.\par At the current time I think it is more important that she has  constipation treated a colonoscopy and then probable colon resection due to the multiple recurrences.\par I would defer further use of Cipro as I am more concerned about drug resistance with multiple courses.  I have given the patient a 2-week course of high-dose oral Vantin which actually is equivalent to intravenous ceftriaxone and continued metronidazole.  Hopefully after this a CAT scan will show resolution so she can have a colonoscopy and then be considered for colon resection\par I explained to the patient at great length that we only use intravenous antibiotics if she had severe diverticulitis with evidence of significant inflammation and all local abscess.\par I discussed with the patient that if she does have another attack but is severe she might require intravenous antibiotics at that point but does not require it now\par Patient will follow-up with both her gastroenterologist and colorectal surgeon\par All issues regarding patient's health and medical problems have been discussed. The patient understands and concurs with the treatment plan.

## 2022-05-17 NOTE — DATA REVIEWED
[FreeTextEntry1] :  ACC: [id number] EXAM:  CT ABDOMEN AND PELVIS OC IC                      \par \par PROCEDURE DATE:  04/05/2022  \par \par \par \par INTERPRETATION:  CLINICAL INFORMATION: 71-year-old female patient with \par left lower quadrant abdominal pain\par \par COMPARISON: Ultrasound of the abdomen 10/6/2017.\par \par CONTRAST/COMPLICATIONS:\par IV Contrast: Omnipaque 350  90 cc administered   10 cc discarded\par Oral Contrast: Omnipaque 300\par Complications: None reported at time of study completion\par \par PROCEDURE:\par CT of the Abdomen and Pelvis was performed.\par Sagittal and coronal reformats were performed.\par \par FINDINGS:\par LOWER CHEST: Pleural-based left-sided calcified nodule measuring 0.8 x \par 1.2 cm in the lingula segment. No additional pulmonary nodules in the \par lung bases. Small hiatal hernia.\par \par LIVER: Within normal limits.\par BILE DUCTS: Normal caliber.\par GALLBLADDER: Within normal limits.\par SPLEEN: Within normal limits.\par PANCREAS: Within normal limits.\par ADRENALS: Within normal limits.\par KIDNEYS/URETERS: Right upper pole renal cyst is stable from prior \par ultrasound measuring 1.9 x 2.0 cm. Partially duplicated collecting system \par on the right with the upper moiety of normal caliber and mild \par hydronephrosis and hydroureter of the lower moiety. There is a 4 mm \par calculus in the right distal ureter, best seen on coronal and sagittal \par images, 602-63. Left-sided pelvicalyceal system and ureter is normal.\par \par BLADDER: The urinary bladder is well-distended. No abnormal wall \par thickening or calculi.\par REPRODUCTIVE ORGANS: Uterus contains a calcified fibroid in the fundus \par and posterior uterine body. No adnexal masses.\par \par BOWEL: Small hiatal hernia. Normal appearance of the small bowel. No \par bowel obstruction. Appendix is normal. Sigmoid colonic and descending \par colonic diverticula. A few diverticula in the proximal left descending \par colon demonstrate mild pericolonic fat stranding, best seen on image \par 602-53.\par PERITONEUM: No ascites. No loculated fluid collections. No \par pneumoperitoneum.\par VESSELS: Significant atherosclerosis of the abdominal aorta and its \par branching vasculature. Hepatic veins, portal vein, SMV and IVC are patent.\par RETROPERITONEUM/LYMPH NODES: No lymphadenopathy.\par ABDOMINAL WALL: Within normal limits.\par BONES: Discogenic degenerative changes of the mid lumbar spine, Schmorl's \par node L2. Severe facet joint degeneration L5-S1.\par \par IMPRESSION:\par Descending colonic and sigmoid colonic diverticulosis with subtle \par inflammation of a few left proximal descending colon diverticula \par compatible with mild acute diverticulitis. No evidence of acute \par complication such as abscess or perforation.\par \par Duplicated right ureteropelvic system with mild hydronephrosis of the \par lower moiety of the right collecting system with a 4 mm partially \par obstructing calculus in the right distal ureter.\par \par Fibroid uterus.\par ER [facility]

## 2022-05-17 NOTE — PHYSICAL EXAM
[General Appearance - Alert] : alert [General Appearance - In No Acute Distress] : in no acute distress [Sclera] : the sclera and conjunctiva were normal [PERRL With Normal Accommodation] : pupils were equal in size, round, reactive to light [Outer Ear] : the ears and nose were normal in appearance [Extraocular Movements] : extraocular movements were intact [Oropharynx] : the oropharynx was normal with no thrush [Neck Cervical Mass (___cm)] : no neck mass was observed [Neck Appearance] : the appearance of the neck was normal [Jugular Venous Distention Increased] : there was no jugular-venous distention [Thyroid Diffuse Enlargement] : the thyroid was not enlarged [Auscultation Breath Sounds / Voice Sounds] : lungs were clear to auscultation bilaterally [Heart Rate And Rhythm] : heart rate was normal and rhythm regular [Heart Sounds] : normal S1 and S2 [Heart Sounds Gallop] : no gallops [Murmurs] : no murmurs [Heart Sounds Pericardial Friction Rub] : no pericardial rub [Full Pulse] : the pedal pulses are present [Edema] : there was no peripheral edema [Bowel Sounds] : normal bowel sounds [Abdomen Soft] : soft [Abdomen Tenderness] : non-tender [Abdomen Mass (___ Cm)] : no abdominal mass palpated [Costovertebral Angle Tenderness] : no CVA tenderness [FreeTextEntry1] : nl [No Palpable Adenopathy] : no palpable adenopathy [Musculoskeletal - Swelling] : no joint swelling [Nail Clubbing] : no clubbing  or cyanosis of the fingernails [Motor Tone] : muscle strength and tone were normal [Skin Color & Pigmentation] : normal skin color and pigmentation [] : no rash

## 2022-05-19 ENCOUNTER — APPOINTMENT (OUTPATIENT)
Dept: GASTROENTEROLOGY | Facility: AMBULATORY MEDICAL SERVICES | Age: 72
End: 2022-05-19

## 2022-06-03 ENCOUNTER — NON-APPOINTMENT (OUTPATIENT)
Age: 72
End: 2022-06-03

## 2022-06-06 ENCOUNTER — NON-APPOINTMENT (OUTPATIENT)
Age: 72
End: 2022-06-06

## 2022-06-07 ENCOUNTER — NON-APPOINTMENT (OUTPATIENT)
Age: 72
End: 2022-06-07

## 2022-06-20 ENCOUNTER — APPOINTMENT (OUTPATIENT)
Dept: ORTHOPEDIC SURGERY | Facility: CLINIC | Age: 72
End: 2022-06-20
Payer: MEDICARE

## 2022-06-20 VITALS — HEIGHT: 62 IN | BODY MASS INDEX: 25.76 KG/M2 | WEIGHT: 140 LBS

## 2022-06-20 DIAGNOSIS — M75.42 IMPINGEMENT SYNDROME OF LEFT SHOULDER: ICD-10-CM

## 2022-06-20 DIAGNOSIS — M19.012 PRIMARY OSTEOARTHRITIS, LEFT SHOULDER: ICD-10-CM

## 2022-06-20 PROCEDURE — 20610 DRAIN/INJ JOINT/BURSA W/O US: CPT

## 2022-06-20 PROCEDURE — 73030 X-RAY EXAM OF SHOULDER: CPT | Mod: LT

## 2022-06-20 PROCEDURE — 99213 OFFICE O/P EST LOW 20 MIN: CPT | Mod: 25

## 2022-06-20 NOTE — HISTORY OF PRESENT ILLNESS
[Gradual] : gradual [7] : 7 [5] : 5 [Dull/Aching] : dull/aching [Sharp] : sharp [Intermittent] : intermittent [Injection therapy] : injection therapy [de-identified] : Has left shoulder pain with certain movements. No injury. Has had neck pain in the past, but neck is ok today.  [] : no [FreeTextEntry1] : BRADLEY arms and Neck  [FreeTextEntry5] : Pt present with neck tightness and BRADLEY arm pain. No injury/trauma

## 2022-06-20 NOTE — PROCEDURE
[Large Joint Injection] : Large joint injection [Left] : of the left [Shoulder] : shoulder [Pain] : pain [Alcohol] : alcohol [Betadine] : betadine [Ethyl Chloride sprayed topically] : ethyl chloride sprayed topically [___ cc    3mg] :  Betamethasone (Celestone) ~Vcc of 3mg [___ cc    1%] : Lidocaine ~Vcc of 1%  (2) chairfast

## 2022-06-20 NOTE — PHYSICAL EXAM
[Left] : left shoulder [Degenerative change] : Degenerative change [] : no swelling [FreeTextEntry1] : inferior humeral head osteophyte c/w OA

## 2022-06-23 DIAGNOSIS — K57.92 DIVERTICULITIS OF INTESTINE, PART UNSPECIFIED, W/OUT PERFORATION OR ABSCESS W/OUT BLEEDING: ICD-10-CM

## 2022-06-24 ENCOUNTER — NON-APPOINTMENT (OUTPATIENT)
Age: 72
End: 2022-06-24

## 2022-06-30 ENCOUNTER — NON-APPOINTMENT (OUTPATIENT)
Age: 72
End: 2022-06-30

## 2022-07-05 ENCOUNTER — NON-APPOINTMENT (OUTPATIENT)
Age: 72
End: 2022-07-05

## 2022-07-05 ENCOUNTER — APPOINTMENT (OUTPATIENT)
Dept: SURGERY | Facility: CLINIC | Age: 72
End: 2022-07-05

## 2022-07-05 DIAGNOSIS — Z86.010 PERSONAL HISTORY OF COLONIC POLYPS: ICD-10-CM

## 2022-07-05 PROCEDURE — 99215 OFFICE O/P EST HI 40 MIN: CPT

## 2022-07-06 PROBLEM — Z86.010 HISTORY OF COLON POLYPS: Status: ACTIVE | Noted: 2020-08-03

## 2022-07-06 NOTE — HISTORY OF PRESENT ILLNESS
[de-identified] : Patient follows up with me again to discuss sigmoid colectomy for recurrent sigmoid diverticulitis.  She was seen in consultation by colorectal surgery in efforts to undergo a preoperative screening colonoscopy as well as consultation for minimally invasive colectomy.  The patient however did not feel comfortable with the practice reporting that she been told that "she lives too far from their practice and it did not make sense for her to follow-up with them".  Although she remains relatively comfortable without any new or acute bouts of diverticulitis she still describes intermittent twinging type pains and cramping in the left lower quadrant.

## 2022-07-06 NOTE — PHYSICAL EXAM
[Normal Breath Sounds] : Normal breath sounds [Normal Heart Sounds] : normal heart sounds [Normal Rate and Rhythm] : normal rate and rhythm [No Rash or Lesion] : No rash or lesion [Alert] : alert [Oriented to Person] : oriented to person [Oriented to Place] : oriented to place [Oriented to Time] : oriented to time [Anxious] : anxious [de-identified] : Healthy appearing woman in no distress [de-identified] : ANGELITA FLOOD EOMI [de-identified] : Soft, nondistended, positive bowel sounds in all four quads.  No hernia or masses. No rebound or guarding.  Minimal tenderness in LLQ.\par  [de-identified] : Ambulating without difficulty or assistance.

## 2022-07-06 NOTE — ASSESSMENT
[FreeTextEntry1] : And this is a 71-year-old woman with a history of recurrent sigmoid diverticulitis and quite extensive diverticular disease along the sigmoid colon and descending colon with scattered diverticuli as proximal as the ascending colon.  Repeat CT scan shows improvement in the pericolonic and colonic thickening and inflammatory changes.  This of course was upon completion of a course of oral antibiotics.  I have been trying to coordinate her with her gastroenterologist to undergo a screening colonoscopy as she was diagnosed with a cecal polyp approximately 3 years ago.  Prior to pursuing any surgical intervention with resection of the sigmoid and/or left colon I wanted to ensure there is no additional or con commitment pathology present which may be addressed simultaneously.  I have reached out to the patient's gastroenterologist and he will coordinate either a colonoscopy formally or a virtual or CT colonoscopy.\par \par The patient has been asked to follow-up with me upon completion of the above so that we may assess those findings and discussed the most appropriate course of surgical intervention.\par \par She has been encouraged to continue a bowel regimen to reduce the risks of constipation.  Stool softeners or mild laxatives may be of benefit.  Appropriate consumption of fiber and fluid has also been recommended.\par \par Once again in the interim should the patient develop worsening left lower quadrant abdominal pain obstruction, bleeding, fevers, nauseousness or vomiting she should notify my office and/or present to the nearest emergency room.

## 2022-07-08 ENCOUNTER — INPATIENT (INPATIENT)
Facility: HOSPITAL | Age: 72
LOS: 6 days | Discharge: HOME | DRG: 331 | End: 2022-07-15
Attending: SURGERY | Admitting: SURGERY
Payer: MEDICARE

## 2022-07-08 VITALS
HEIGHT: 62 IN | RESPIRATION RATE: 16 BRPM | SYSTOLIC BLOOD PRESSURE: 140 MMHG | TEMPERATURE: 98 F | WEIGHT: 145.06 LBS | HEART RATE: 81 BPM | OXYGEN SATURATION: 95 % | DIASTOLIC BLOOD PRESSURE: 64 MMHG

## 2022-07-08 DIAGNOSIS — Z87.09 PERSONAL HISTORY OF OTHER DISEASES OF THE RESPIRATORY SYSTEM: ICD-10-CM

## 2022-07-08 DIAGNOSIS — Z29.9 ENCOUNTER FOR PROPHYLACTIC MEASURES, UNSPECIFIED: ICD-10-CM

## 2022-07-08 DIAGNOSIS — E78.5 HYPERLIPIDEMIA, UNSPECIFIED: ICD-10-CM

## 2022-07-08 DIAGNOSIS — K57.92 DIVERTICULITIS OF INTESTINE, PART UNSPECIFIED, WITHOUT PERFORATION OR ABSCESS WITHOUT BLEEDING: ICD-10-CM

## 2022-07-08 LAB
ALBUMIN SERPL ELPH-MCNC: 3.4 G/DL — SIGNIFICANT CHANGE UP (ref 3.3–5)
ALP SERPL-CCNC: 61 U/L — SIGNIFICANT CHANGE UP (ref 40–120)
ALT FLD-CCNC: 25 U/L — SIGNIFICANT CHANGE UP (ref 12–78)
ANION GAP SERPL CALC-SCNC: 8 MMOL/L — SIGNIFICANT CHANGE UP (ref 5–17)
APPEARANCE UR: CLEAR — SIGNIFICANT CHANGE UP
APTT BLD: 28.7 SEC — SIGNIFICANT CHANGE UP (ref 27.5–35.5)
AST SERPL-CCNC: 33 U/L — SIGNIFICANT CHANGE UP (ref 15–37)
BASOPHILS # BLD AUTO: 0.05 K/UL — SIGNIFICANT CHANGE UP (ref 0–0.2)
BASOPHILS NFR BLD AUTO: 0.5 % — SIGNIFICANT CHANGE UP (ref 0–2)
BILIRUB SERPL-MCNC: 0.5 MG/DL — SIGNIFICANT CHANGE UP (ref 0.2–1.2)
BILIRUB UR-MCNC: NEGATIVE — SIGNIFICANT CHANGE UP
BLD GP AB SCN SERPL QL: SIGNIFICANT CHANGE UP
BUN SERPL-MCNC: 17 MG/DL — SIGNIFICANT CHANGE UP (ref 7–23)
CALCIUM SERPL-MCNC: 9 MG/DL — SIGNIFICANT CHANGE UP (ref 8.5–10.1)
CHLORIDE SERPL-SCNC: 103 MMOL/L — SIGNIFICANT CHANGE UP (ref 96–108)
CO2 SERPL-SCNC: 25 MMOL/L — SIGNIFICANT CHANGE UP (ref 22–31)
COLOR SPEC: SIGNIFICANT CHANGE UP
CREAT SERPL-MCNC: 0.64 MG/DL — SIGNIFICANT CHANGE UP (ref 0.5–1.3)
DIFF PNL FLD: NEGATIVE — SIGNIFICANT CHANGE UP
EGFR: 94 ML/MIN/1.73M2 — SIGNIFICANT CHANGE UP
EOSINOPHIL # BLD AUTO: 0.05 K/UL — SIGNIFICANT CHANGE UP (ref 0–0.5)
EOSINOPHIL NFR BLD AUTO: 0.5 % — SIGNIFICANT CHANGE UP (ref 0–6)
GLUCOSE SERPL-MCNC: 104 MG/DL — HIGH (ref 70–99)
GLUCOSE UR QL: NEGATIVE — SIGNIFICANT CHANGE UP
HCT VFR BLD CALC: 38.2 % — SIGNIFICANT CHANGE UP (ref 34.5–45)
HGB BLD-MCNC: 12.8 G/DL — SIGNIFICANT CHANGE UP (ref 11.5–15.5)
IMM GRANULOCYTES NFR BLD AUTO: 0.3 % — SIGNIFICANT CHANGE UP (ref 0–1.5)
INR BLD: 1.23 RATIO — HIGH (ref 0.88–1.16)
KETONES UR-MCNC: NEGATIVE — SIGNIFICANT CHANGE UP
LEUKOCYTE ESTERASE UR-ACNC: ABNORMAL
LYMPHOCYTES # BLD AUTO: 1.2 K/UL — SIGNIFICANT CHANGE UP (ref 1–3.3)
LYMPHOCYTES # BLD AUTO: 11.3 % — LOW (ref 13–44)
MCHC RBC-ENTMCNC: 30.2 PG — SIGNIFICANT CHANGE UP (ref 27–34)
MCHC RBC-ENTMCNC: 33.5 GM/DL — SIGNIFICANT CHANGE UP (ref 32–36)
MCV RBC AUTO: 90.1 FL — SIGNIFICANT CHANGE UP (ref 80–100)
MONOCYTES # BLD AUTO: 0.86 K/UL — SIGNIFICANT CHANGE UP (ref 0–0.9)
MONOCYTES NFR BLD AUTO: 8.1 % — SIGNIFICANT CHANGE UP (ref 2–14)
NEUTROPHILS # BLD AUTO: 8.46 K/UL — HIGH (ref 1.8–7.4)
NEUTROPHILS NFR BLD AUTO: 79.3 % — HIGH (ref 43–77)
NITRITE UR-MCNC: NEGATIVE — SIGNIFICANT CHANGE UP
NRBC # BLD: 0 /100 WBCS — SIGNIFICANT CHANGE UP (ref 0–0)
PH UR: 6.5 — SIGNIFICANT CHANGE UP (ref 5–8)
PLATELET # BLD AUTO: 347 K/UL — SIGNIFICANT CHANGE UP (ref 150–400)
POTASSIUM SERPL-MCNC: 4.4 MMOL/L — SIGNIFICANT CHANGE UP (ref 3.5–5.3)
POTASSIUM SERPL-SCNC: 4.4 MMOL/L — SIGNIFICANT CHANGE UP (ref 3.5–5.3)
PROT SERPL-MCNC: 7.4 G/DL — SIGNIFICANT CHANGE UP (ref 6–8.3)
PROT UR-MCNC: NEGATIVE — SIGNIFICANT CHANGE UP
PROTHROM AB SERPL-ACNC: 14.4 SEC — HIGH (ref 10.5–13.4)
RBC # BLD: 4.24 M/UL — SIGNIFICANT CHANGE UP (ref 3.8–5.2)
RBC # FLD: 14.1 % — SIGNIFICANT CHANGE UP (ref 10.3–14.5)
SARS-COV-2 RNA SPEC QL NAA+PROBE: SIGNIFICANT CHANGE UP
SODIUM SERPL-SCNC: 136 MMOL/L — SIGNIFICANT CHANGE UP (ref 135–145)
SP GR SPEC: 1 — LOW (ref 1.01–1.02)
UROBILINOGEN FLD QL: NEGATIVE — SIGNIFICANT CHANGE UP
WBC # BLD: 10.65 K/UL — HIGH (ref 3.8–10.5)
WBC # FLD AUTO: 10.65 K/UL — HIGH (ref 3.8–10.5)

## 2022-07-08 PROCEDURE — 99223 1ST HOSP IP/OBS HIGH 75: CPT

## 2022-07-08 PROCEDURE — 99285 EMERGENCY DEPT VISIT HI MDM: CPT | Mod: FS

## 2022-07-08 PROCEDURE — 71045 X-RAY EXAM CHEST 1 VIEW: CPT | Mod: 26

## 2022-07-08 PROCEDURE — 74177 CT ABD & PELVIS W/CONTRAST: CPT | Mod: 26,MA

## 2022-07-08 PROCEDURE — 93010 ELECTROCARDIOGRAM REPORT: CPT

## 2022-07-08 PROCEDURE — 99221 1ST HOSP IP/OBS SF/LOW 40: CPT

## 2022-07-08 PROCEDURE — 99222 1ST HOSP IP/OBS MODERATE 55: CPT

## 2022-07-08 RX ORDER — KETOROLAC TROMETHAMINE 30 MG/ML
15 SYRINGE (ML) INJECTION EVERY 6 HOURS
Refills: 0 | Status: DISCONTINUED | OUTPATIENT
Start: 2022-07-08 | End: 2022-07-11

## 2022-07-08 RX ORDER — HYDROMORPHONE HYDROCHLORIDE 2 MG/ML
0.5 INJECTION INTRAMUSCULAR; INTRAVENOUS; SUBCUTANEOUS EVERY 4 HOURS
Refills: 0 | Status: DISCONTINUED | OUTPATIENT
Start: 2022-07-08 | End: 2022-07-11

## 2022-07-08 RX ORDER — ACETAMINOPHEN 500 MG
1000 TABLET ORAL ONCE
Refills: 0 | Status: COMPLETED | OUTPATIENT
Start: 2022-07-10 | End: 2022-07-10

## 2022-07-08 RX ORDER — ACETAMINOPHEN 500 MG
1000 TABLET ORAL ONCE
Refills: 0 | Status: COMPLETED | OUTPATIENT
Start: 2022-07-09 | End: 2022-07-09

## 2022-07-08 RX ORDER — ONDANSETRON 8 MG/1
4 TABLET, FILM COATED ORAL EVERY 6 HOURS
Refills: 0 | Status: DISCONTINUED | OUTPATIENT
Start: 2022-07-08 | End: 2022-07-11

## 2022-07-08 RX ORDER — CIPROFLOXACIN LACTATE 400MG/40ML
400 VIAL (ML) INTRAVENOUS ONCE
Refills: 0 | Status: DISCONTINUED | OUTPATIENT
Start: 2022-07-08 | End: 2022-07-08

## 2022-07-08 RX ORDER — ACETAMINOPHEN 500 MG
1000 TABLET ORAL ONCE
Refills: 0 | Status: COMPLETED | OUTPATIENT
Start: 2022-07-08 | End: 2022-07-08

## 2022-07-08 RX ORDER — SODIUM CHLORIDE 9 MG/ML
1000 INJECTION INTRAMUSCULAR; INTRAVENOUS; SUBCUTANEOUS
Refills: 0 | Status: DISCONTINUED | OUTPATIENT
Start: 2022-07-08 | End: 2022-07-11

## 2022-07-08 RX ORDER — SODIUM CHLORIDE 9 MG/ML
1000 INJECTION INTRAMUSCULAR; INTRAVENOUS; SUBCUTANEOUS ONCE
Refills: 0 | Status: COMPLETED | OUTPATIENT
Start: 2022-07-08 | End: 2022-07-08

## 2022-07-08 RX ORDER — ATORVASTATIN CALCIUM 80 MG/1
40 TABLET, FILM COATED ORAL AT BEDTIME
Refills: 0 | Status: DISCONTINUED | OUTPATIENT
Start: 2022-07-08 | End: 2022-07-11

## 2022-07-08 RX ORDER — METRONIDAZOLE 500 MG
500 TABLET ORAL ONCE
Refills: 0 | Status: COMPLETED | OUTPATIENT
Start: 2022-07-08 | End: 2022-07-08

## 2022-07-08 RX ORDER — HEPARIN SODIUM 5000 [USP'U]/ML
5000 INJECTION INTRAVENOUS; SUBCUTANEOUS EVERY 8 HOURS
Refills: 0 | Status: DISCONTINUED | OUTPATIENT
Start: 2022-07-08 | End: 2022-07-11

## 2022-07-08 RX ORDER — SOD SULF/SODIUM/NAHCO3/KCL/PEG
2000 SOLUTION, RECONSTITUTED, ORAL ORAL ONCE
Refills: 0 | Status: DISCONTINUED | OUTPATIENT
Start: 2022-07-09 | End: 2022-07-09

## 2022-07-08 RX ORDER — PIPERACILLIN AND TAZOBACTAM 4; .5 G/20ML; G/20ML
3.38 INJECTION, POWDER, LYOPHILIZED, FOR SOLUTION INTRAVENOUS ONCE
Refills: 0 | Status: COMPLETED | OUTPATIENT
Start: 2022-07-08 | End: 2022-07-08

## 2022-07-08 RX ORDER — PIPERACILLIN AND TAZOBACTAM 4; .5 G/20ML; G/20ML
3.38 INJECTION, POWDER, LYOPHILIZED, FOR SOLUTION INTRAVENOUS EVERY 8 HOURS
Refills: 0 | Status: DISCONTINUED | OUTPATIENT
Start: 2022-07-08 | End: 2022-07-11

## 2022-07-08 RX ADMIN — Medication 400 MILLIGRAM(S): at 15:49

## 2022-07-08 RX ADMIN — SODIUM CHLORIDE 1000 MILLILITER(S): 9 INJECTION INTRAMUSCULAR; INTRAVENOUS; SUBCUTANEOUS at 10:52

## 2022-07-08 RX ADMIN — Medication 1000 MILLIGRAM(S): at 16:05

## 2022-07-08 RX ADMIN — HEPARIN SODIUM 5000 UNIT(S): 5000 INJECTION INTRAVENOUS; SUBCUTANEOUS at 22:42

## 2022-07-08 RX ADMIN — PIPERACILLIN AND TAZOBACTAM 200 GRAM(S): 4; .5 INJECTION, POWDER, LYOPHILIZED, FOR SOLUTION INTRAVENOUS at 16:20

## 2022-07-08 RX ADMIN — SODIUM CHLORIDE 1000 MILLILITER(S): 9 INJECTION INTRAMUSCULAR; INTRAVENOUS; SUBCUTANEOUS at 15:49

## 2022-07-08 RX ADMIN — Medication 100 MILLIGRAM(S): at 14:45

## 2022-07-08 RX ADMIN — SODIUM CHLORIDE 70 MILLILITER(S): 9 INJECTION INTRAMUSCULAR; INTRAVENOUS; SUBCUTANEOUS at 15:49

## 2022-07-08 NOTE — H&P ADULT - NSHPLABSRESULTS_GEN_ALL_CORE
LABS:                        12.8   10.65 )-----------( 347      ( 2022 10:35 )             38.2         136  |  103  |  17  ----------------------------<  104<H>  4.4   |  25  |  0.64    Ca    9.0      2022 10:35    TPro  7.4  /  Alb  3.4  /  TBili  0.5  /  DBili  x   /  AST  33  /  ALT  25  /  AlkPhos  61  07-08      Urinalysis Basic - ( 2022 11:45 )    Color: Pale Yellow / Appearance: Clear / S.005 / pH: x  Gluc: x / Ketone: Negative  / Bili: Negative / Urobili: Negative   Blood: x / Protein: Negative / Nitrite: Negative   Leuk Esterase: Trace / RBC: 0-2 /HPF / WBC 0-2   Sq Epi: x / Non Sq Epi: Occasional / Bacteria: x      RADIOLOGY & ADDITIONAL TESTS:  < from: CT Abdomen and Pelvis w/ IV Cont (22 @ 12:46) >    IMPRESSION:  Focal mural thickening at the proximal sigmoid colon with adjacent   stranding and diverticulosis, compatible with diverticulitis. No   associated abscess is identified. Follow-up colonoscopy after   treatment/resolution of acute disease may be pursued to rule out colon   cancer.    No evidence for a ureteral calculus. Duplicated right collecting system   with mild right hydroureteronephrosis, probably due to vesicoureteral   reflux.    --- End of Report ---  ION RAY MD; Attending Radiologist  This document has been electronically signed. 2022  1:41PM    < end of copied text >

## 2022-07-08 NOTE — CONSULT NOTE ADULT - SUBJECTIVE AND OBJECTIVE BOX
NOTE IN PROGRESS    [facility] - Wu Moreno Grossman, [place], Jarrod, Ameya, Jaimie Marroquin  Office Number: [phone]    Initial Consult Note    CHIEF COMPLAINT: Patient is a 71y old  Female who presents with a chief complaint of diverticulitis (2022 14:41)      HPI:  72 y/o female with PMHx of HLD and COPD presents to the ED with worsening lower abdominal pain, she states her pain is intermittent (began in march), rates it a 7/10 at the moment. She also endorses constipation (reports a small BM this morning), fatigue, night chills, bloating, & nausea. Her last colonoscopy was two years ago. Pt states her outpt GI was unwilling to undergo a colonoscopy at this time.   She denies chest pain, HUGHES, shortness of breath, palpitations, orthopnea, or PND     PAST MEDICAL & SURGICAL HISTORY:  Sigmoid diverticulitis      HLD (hyperlipidemia)      COPD (chronic obstructive pulmonary disease)        SOCIAL HISTORY:  No tobacco, ethanol, or drug abuse.  FAMILY HISTORY:    No family history of acute MI or sudden cardiac death.  MEDICATIONS  (STANDING):    MEDICATIONS  (PRN):    Allergies    No Known Allergies    Intolerances      REVIEW OF SYSTEMS:  CONSTITUTIONAL: No weakness, fevers or chills  EYES/ENT: No visual changes;  No vertigo or throat pain   NECK: No pain or stiffness  RESPIRATORY: No cough, wheezing, hemoptysis; No shortness of breath  CARDIOVASCULAR: No chest pain or palpitations  GASTROINTESTINAL: No abdominal pain. No nausea, vomiting, or hematemesis; No diarrhea or constipation. No melena or hematochezia.  GENITOURINARY: No dysuria, frequency or hematuria  NEUROLOGICAL: No numbness or weakness  SKIN: No itching or rash  All other review of systems is negative unless indicated above  VITAL SIGNS:   Vital Signs Last 24 Hrs  T(C): 36.9 (2022 09:18), Max: 36.9 (2022 09:18)  T(F): 98.4 (2022 09:18), Max: 98.4 (2022 09:18)  HR: 81 (2022 09:18) (81 - 81)  BP: 140/64 (2022 09:18) (140/64 - 140/64)  BP(mean): --  RR: 16 (2022 09:18) (16 - 16)  SpO2: 95% (2022 09:18) (95% - 95%)    Parameters below as of 2022 09:18  Patient On (Oxygen Delivery Method): room air      I&O's Summary    PHYSICAL EXAM:  GENERAL: NAD, well-developed  HEAD:  Atraumatic, Normocephalic  EYES: EOMI, PERRLA, conjunctiva and sclera clear  NECK: Supple, No JVD  CHEST/LUNG: Clear to auscultation bilaterally; No wheeze  HEART: Regular rate and rhythm; No murmurs, rubs, or gallops  ABDOMEN: Soft, +TTP LLQ with rebound tenderness,  Nondistended; Bowel sounds present  EXTREMITIES:  2+ Peripheral Pulses, No clubbing, cyanosis, or edema  PSYCH: AAOx3  NEUROLOGY: non-focal  SKIN: No rashes or lesions    LABS: All Labs Reviewed:                        12.8   10.65 )-----------( 347      ( 2022 10:35 )             38.2     2022 10:35    136    |  103    |  17     ----------------------------<  104    4.4     |  25     |  0.64     Ca    9.0        2022 10:35    TPro  7.4    /  Alb  3.4    /  TBili  0.5    /  DBili  x      /  AST  33     /  ALT  25     /  AlkPhos  61     2022 10:35      RADIOLOGY:  Labs, Radiology, Cardiology, and Other Results: LABS:                        .8   10.65 )-----------( 347      ( 2022 10:35 )             38.2     07-08    136  |  103  |  17  ----------------------------<  104<H>  4.4   |  25  |  0.64    Ca    9.0      2022 10:35    TPro  7.4  /  Alb  3.4  /  TBili  0.5  /  DBili  x   /  AST  33  /  ALT  25  /  AlkPhos  61  07-08      Urinalysis Basic - ( 2022 11:45 )    Color: Pale Yellow / Appearance: Clear / S.005 / pH: x  Gluc: x / Ketone: Negative  / Bili: Negative / Urobili: Negative   Blood: x / Protein: Negative / Nitrite: Negative   Leuk Esterase: Trace / RBC: 0-2 /HPF / WBC 0-2   Sq Epi: x / Non Sq Epi: Occasional / Bacteria: x      RADIOLOGY & ADDITIONAL TESTS:  < from: CT Abdomen and Pelvis w/ IV Cont (22 @ 12:46) >    IMPRESSION:  Focal mural thickening at the proximal sigmoid colon with adjacent   stranding and diverticulosis, compatible with diverticulitis. No   associated abscess is identified. Follow-up colonoscopy after   treatment/resolution of acute disease may be pursued to rule out colon   cancer.    No evidence for a ureteral calculus. Duplicated right collecting system   with mild right hydroureteronephrosis, probably due to vesicoureteral   reflux.    --- End of Report ---  ION RAY MD; Attending Radiologist  This document has been electronically signed. 2022  1:41PM    < end of copied text >      EKG: NSR 75       [facility] - Wu Moreno, Chay Concepcion, Jarrod, Ameya, Jaimie Marroquin  Office Number: [phone]    Initial Consult Note    CHIEF COMPLAINT: Patient is a 71y old  Female who presents with a chief complaint of diverticulitis (2022 14:41)      HPI:  70 y/o female with PMHx of HLD and COPD presents to the ED with worsening lower abdominal pain, she states her pain is intermittent (began in march), rates it a 7/10 at the moment, worsen with palpation, L<R.   She also endorses constipation (reports a small BM this morning), fatigue, night chills, bloating, & nausea. Her last colonoscopy was two years ago. Pt states her outpt GI was unwilling to undergo a colonoscopy at this time.   She denies chest pain, HUGHES, shortness of breath, palpitations, orthopnea, PND, or syncope. Patient is walks daily and able to climb >2 flights of stairs without HUGHES.     PAST MEDICAL & SURGICAL HISTORY:  Sigmoid diverticulitis      HLD (hyperlipidemia)      COPD (chronic obstructive pulmonary disease)        SOCIAL HISTORY:  No tobacco, ethanol, or drug abuse.  FAMILY HISTORY:    No family history of acute MI or sudden cardiac death.  MEDICATIONS  (STANDING):    MEDICATIONS  (PRN):    Allergies    No Known Allergies    Intolerances      REVIEW OF SYSTEMS:  All other review of systems is negative unless indicated above    VITAL SIGNS:   Vital Signs Last 24 Hrs  T(C): 36.9 (2022 09:18), Max: 36.9 (2022 09:18)  T(F): 98.4 (2022 09:18), Max: 98.4 (2022 09:18)  HR: 81 (2022 09:18) (81 - 81)  BP: 140/64 (2022 09:18) (140/64 - 140/64)  BP(mean): --  RR: 16 (2022 09:18) (16 - 16)  SpO2: 95% (2022 09:18) (95% - 95%)    Parameters below as of 2022 09:18  Patient On (Oxygen Delivery Method): room air      I&O's Summary    PHYSICAL EXAM:  GENERAL: NAD, well-developed  HEAD:  Atraumatic, Normocephalic  EYES: EOMI, PERRLA, conjunctiva and sclera clear  NECK: Supple, No JVD  CHEST/LUNG: Clear to auscultation bilaterally; No wheeze  HEART: Regular rate and rhythm; No murmurs, rubs, or gallops  ABDOMEN: Soft, +TTP RLQ with rebound tenderness,  Nondistended; Bowel sounds present  EXTREMITIES:  2+ Peripheral Pulses, No clubbing, cyanosis, or edema  PSYCH: AAOx3  NEUROLOGY: non-focal  SKIN: No rashes or lesions    LABS: All Labs Reviewed:                        .8   10.65 )-----------( 347      ( 2022 10:35 )             38.2     2022 10:35    136    |  103    |  17     ----------------------------<  104    4.4     |  25     |  0.64     Ca    9.0        2022 10:35    TPro  7.4    /  Alb  3.4    /  TBili  0.5    /  DBili  x      /  AST  33     /  ALT  25     /  AlkPhos  61     2022 10:35      RADIOLOGY:  Labs, Radiology, Cardiology, and Other Results: LABS:                        .8   10.65 )-----------( 347      ( 2022 10:35 )             38.2     07-08    136  |  103  |  17  ----------------------------<  104<H>  4.4   |  25  |  0.64    Ca    9.0      2022 10:35    TPro  7.4  /  Alb  3.4  /  TBili  0.5  /  DBili  x   /  AST  33  /  ALT  25  /  AlkPhos  61  07-08      Urinalysis Basic - ( 2022 11:45 )    Color: Pale Yellow / Appearance: Clear / S.005 / pH: x  Gluc: x / Ketone: Negative  / Bili: Negative / Urobili: Negative   Blood: x / Protein: Negative / Nitrite: Negative   Leuk Esterase: Trace / RBC: 0-2 /HPF / WBC 0-2   Sq Epi: x / Non Sq Epi: Occasional / Bacteria: x      RADIOLOGY & ADDITIONAL TESTS:  < from: CT Abdomen and Pelvis w/ IV Cont (22 @ 12:46) >    IMPRESSION:  Focal mural thickening at the proximal sigmoid colon with adjacent   stranding and diverticulosis, compatible with diverticulitis. No   associated abscess is identified. Follow-up colonoscopy after   treatment/resolution of acute disease may be pursued to rule out colon   cancer.    No evidence for a ureteral calculus. Duplicated right collecting system   with mild right hydroureteronephrosis, probably due to vesicoureteral   reflux.    --- End of Report ---  ION RAY MD; Attending Radiologist  This document has been electronically signed. 2022  1:41PM    < end of copied text >      EKG: NSR 75       [facility] - Wu Moreno, Chay Concepcion, Jarrod, Ameya, Jaimie Marroquin  Office Number: [phone]    Initial Consult Note    CHIEF COMPLAINT: Patient is a 71y old  Female who presents with a chief complaint of diverticulitis (2022 14:41)      HPI:  72 y/o female with PMHx of HLD and COPD presents to the ED with worsening lower abdominal pain, she states her pain is intermittent (began in march), rates it a 7/10 at the moment, worsen with palpation, L<R.   She also endorses constipation (reports a small BM this morning), fatigue, night chills, bloating, & nausea. Her last colonoscopy was two years ago. Pt states her outpt GI was unwilling to undergo a colonoscopy at this time.   She denies chest pain, HUGHES, shortness of breath, palpitations, orthopnea, PND, or syncope. Patient is walks daily and able to climb >2 flights of stairs without HUGHES.     PAST MEDICAL & SURGICAL HISTORY:  Sigmoid diverticulitis      HLD (hyperlipidemia)      COPD (chronic obstructive pulmonary disease)        SOCIAL HISTORY:  No tobacco, ethanol, or drug abuse.  FAMILY HISTORY:    No family history of acute MI or sudden cardiac death.  MEDICATIONS  (STANDING):    MEDICATIONS  (PRN):    Allergies    No Known Allergies    Intolerances      REVIEW OF SYSTEMS:  All other review of systems is negative unless indicated above    VITAL SIGNS:   Vital Signs Last 24 Hrs  T(C): 36.9 (2022 09:18), Max: 36.9 (2022 09:18)  T(F): 98.4 (2022 09:18), Max: 98.4 (2022 09:18)  HR: 81 (2022 09:18) (81 - 81)  BP: 140/64 (2022 09:18) (140/64 - 140/64)  BP(mean): --  RR: 16 (2022 09:18) (16 - 16)  SpO2: 95% (2022 09:18) (95% - 95%)    Parameters below as of 2022 09:18  Patient On (Oxygen Delivery Method): room air      I&O's Summary    PHYSICAL EXAM:  GENERAL: NAD, well-developed  HEAD:  Atraumatic, Normocephalic  EYES: EOMI, PERRLA, conjunctiva and sclera clear  NECK: Supple, No JVD  CHEST/LUNG: Clear to auscultation bilaterally; No wheeze  HEART: Regular rate and rhythm; No murmurs, rubs, or gallops  ABDOMEN: Soft, +TTP RLQ with rebound tenderness,  Nondistended; Bowel sounds present  EXTREMITIES:  2+ Peripheral Pulses, No clubbing, cyanosis, or edema  PSYCH: AAOx3  NEUROLOGY: non-focal  SKIN: No rashes or lesions    LABS: All Labs Reviewed:                        .8   10.65 )-----------( 347      ( 2022 10:35 )             38.2     2022 10:35    136    |  103    |  17     ----------------------------<  104    4.4     |  25     |  0.64     Ca    9.0        2022 10:35    TPro  7.4    /  Alb  3.4    /  TBili  0.5    /  DBili  x      /  AST  33     /  ALT  25     /  AlkPhos  61     2022 10:35      RADIOLOGY:  Labs, Radiology, Cardiology, and Other Results: LABS:                        .8   10.65 )-----------( 347      ( 2022 10:35 )             38.2     07-08    136  |  103  |  17  ----------------------------<  104<H>  4.4   |  25  |  0.64    Ca    9.0      2022 10:35    TPro  7.4  /  Alb  3.4  /  TBili  0.5  /  DBili  x   /  AST  33  /  ALT  25  /  AlkPhos  61  07-08      Urinalysis Basic - ( 2022 11:45 )    Color: Pale Yellow / Appearance: Clear / S.005 / pH: x  Gluc: x / Ketone: Negative  / Bili: Negative / Urobili: Negative   Blood: x / Protein: Negative / Nitrite: Negative   Leuk Esterase: Trace / RBC: 0-2 /HPF / WBC 0-2   Sq Epi: x / Non Sq Epi: Occasional / Bacteria: x      RADIOLOGY & ADDITIONAL TESTS:  < from: CT Abdomen and Pelvis w/ IV Cont (22 @ 12:46) >    IMPRESSION:  Focal mural thickening at the proximal sigmoid colon with adjacent   stranding and diverticulosis, compatible with diverticulitis. No   associated abscess is identified. Follow-up colonoscopy after   treatment/resolution of acute disease may be pursued to rule out colon   cancer.    No evidence for a ureteral calculus. Duplicated right collecting system   with mild right hydroureteronephrosis, probably due to vesicoureteral   reflux.    --- End of Report ---  ION RAY MD; Attending Radiologist  This document has been electronically signed. 2022  1:41PM    < end of copied text >      EKG: NSR 75bpm

## 2022-07-08 NOTE — H&P ADULT - HISTORY OF PRESENT ILLNESS
70 y/o female with PMHx of HLD and COPD presents to the ED with worsening lower abdominal pain, she states her pain is intermittent (began in march), rates it a 7/10 at the moment. She also endorses constipation (reports a small BM this morning), fatigue, night chills, bloating, & nausea. Her last colonoscopy was two years ago. Pt states her outpt GI was unwilling to undergo a colonoscopy at this time. Denies chest pain, shortness of breath, vomiting, BRBPR, melena, urinary sx.

## 2022-07-08 NOTE — H&P ADULT - NSHPPHYSICALEXAM_GEN_ALL_CORE
PHYSICAL EXAM:  GENERAL: NAD, resting comfortably in bed  HEAD:  Atraumatic, normocephalic  EYES: EOMI, PERRLA, conjunctiva and sclera clear  NECK: Supple  CHEST/LUNG: CTA B/L, good inspiratory effort.  HEART: RRR. +S1/S2.  ABDOMEN: Soft, nondistended, tender to palpation in left & right lower quadrants, (+) rebound tenderness, no guarding or palpable masses, (+) BS  EXTREMITIES:  2+ peripheral pulses, no clubbing, cyanosis, or edema  PSYCH: A&O x3  NEUROLOGY: Non-focal, motor & sensory grossly intact  SKIN: Warm & dry, no rashes or lesions

## 2022-07-08 NOTE — PROGRESS NOTE ADULT - ASSESSMENT
Pt seen in ED.  Minimal LLQ and suprapubic pain.  Repeat CT scan relatively unchanged from April study, slightly increased signs of inflammation, no extraluminal collections of perforations.  Offered admission for IV antibiotics, medical/cardiac clearance and bowel prep.  Will initiate ERAS protocols in anticipation of Lap Sigmoid Colectomy on Monday.  Will admit to my service.  Discussed with the patient who is in agreement with plan  Discussed with ED attending.  Full consult to follow.

## 2022-07-08 NOTE — ED ADULT NURSE NOTE - CHPI ED NUR SYMPTOMS NEG
no abdominal distension/no blood in stool/no burning urination/no diarrhea/no dysuria/no fever/no hematuria/no nausea/no vomiting

## 2022-07-08 NOTE — PATIENT PROFILE ADULT - DATE OF LAST VACCINATION
Telephone Encounter by Janay Whitaker RN at 04/08/17 12:41 PM     Author:  Janay Whitaker RN Service:  (none) Author Type:  Registered Nurse     Filed:  04/08/17 12:44 PM Encounter Date:  4/8/2017 Status:  Signed     :  Janay Whitaker RN (Registered Nurse)            Spoke with mom, informed her of Dr. Orellana's response.  Mom states they were a no show for clinical visit due to him having appendectomy today.   Mom states she will call back to book appointment.[KW1.1M]  Electronically Signed by:    Janay Whitaker RN , 4/8/2017[KW1.2T]        Revision History        User Key Date/Time User Provider Type Action    > KW1.2 04/08/17 12:44 PM Janay Whitaker, RN Registered Nurse Sign     KW1.1 04/08/17 12:41 PM Janay Whitaker, RN Registered Nurse     M - Manual, T - Template             08-Mar-2022

## 2022-07-08 NOTE — H&P ADULT - PROBLEM SELECTOR PLAN 1
- Admit to surgery, plan for the OR on Monday 7/11  - Clear liquid diet, plan to be NPO after midnight on Sunday  - Plan for bowel prep  - [place] protocol  - Zosyn  - Medicine & cardio clearance

## 2022-07-08 NOTE — ED PROVIDER NOTE - ATTENDING APP SHARED VISIT CONTRIBUTION OF CARE
This was a shared visit with AVRIL. I reviewed and verified the documentation and independently performed the documented MDM.

## 2022-07-08 NOTE — ED PROVIDER NOTE - OBJECTIVE STATEMENT
Pt is a 72 yo female with pmhx of hld copd diverticulitis insomnia here for abdominal pain bloating and constipation for 2 days denies any nvd + chills no fever. Pt states in process of seeing Dr. Regalado and Dr. Olivo. Pt denies any chest pain sob no abdominal surgery hx.

## 2022-07-08 NOTE — ED ADULT TRIAGE NOTE - CHIEF COMPLAINT QUOTE
Patient is a 70yo female complaining of abdominal diffuse with constipation Patient states that the pain start Tuesday morning. Patient states she has a history of diverticulitis and was told to come to the hospital for evaluation

## 2022-07-08 NOTE — H&P ADULT - ASSESSMENT
70 y/o female with PMHx of HLD & COPD presenting with abdominal pain, found to have active diverticulitis on CT.

## 2022-07-08 NOTE — CONSULT NOTE ADULT - ASSESSMENT
70 y/o female with PMHx of HLD & COPD presenting with abdominal pain. Admitted for diverticulitis, plan for OR Monday 7/11.   Consulted for medical clearance.

## 2022-07-08 NOTE — PATIENT PROFILE ADULT - PRO INTERPRETER NEED 2
Referred by: Saige Rodas MD; Medical Diagnosis (from order):    Diagnosis Information      Diagnosis    V43.64 (ICD-9-CM) - Z96.642 (ICD-10-CM) - Status post left hip replacement                  Date of onset: 9/10/2021  Date of surgery: 9/10/2021  Diagnosis Precautions: L THR (anterior approach)  Not allowed to cross his legs at the moment.  Patient alert and oriented X3.  Chart reviewed at time of initial evaluation (relevant co-morbidities, allergies, tests and medications listed): Past Medical History:  No date: Anxiety  No date: Arthritis  No date: BPH  No date: Depressive disorder  No date: HLD  No date: HTN  No date: Peptic ulcer  No date: Primary malignant neuroendocrine tumor of small intestine   (CMS/HCC)  Past Surgical History:  No date: Colonoscopy  No date: Cystoscopy  No date: Cystoscopy w/ laser lithotripsy  No date: Hernia repair      Comment:  inguinal   No date: Mini-laparotomy  ALLERGIES:   -- Gold -- RASH  Diagnostic Tests: X-ray: reviewed.      SUBJECTIVE                                                                                                               9/17/21   Patient states he had trouble sleeping last night and has L hip pain up to 8/10 this morning. Hip feels stiff.  His wife (a doctor) changed his dressing this morning and said that incision looked good.      At [facility] on 9/13/21 patient c/o pain and stiffness in both hips for several years which was getting worse so underwent L THR 9/10/21. Stayed in hospital 1 day then discharged home. Was able to do stairs before going home.    Occ   Retired .  Sports/hobbies   Fishing and hunting, working out 3-4x week.  Functional Change: Using walker to ambulate.   Step-to pattern on stairs.    OBJECTIVE                                                                                                                     Observed Gait:     At [facility] on 9/13/21 patient ambulates with RW WBAT.  Step-to pattern on stairs.    Range  of Motion (ROM)   (degrees unless noted; active unless noted; norms in ( ); negative=lacking to 0, positive=beyond 0)   Details / Comments: At eval on 9/13/21 AA ROM L hip flex 65, abd 25 degrees.  Active L knee extension (supine) lacks 15 degrees.  Strength not formally tested due post-op pain (L THR 9/10/21)  Thigh circumference L 48, R 43 cm.  Patient unable to perform active heel slides in supine due to pain/stiffness L hip.         TREATMENT                                                                                                                  Therapeutic Exercise:  Nu step seat 12, L3, 3 minutes.  Gentle passive stretches L hip flexion and abduction.  Gentle passive stretches L knee extension (supine)  Supine Ankle Pumps - 6 x daily - 7 x weekly - 1 sets - 20 reps  Supine Quad Set - 3 x daily - 7 x weekly - 1 sets - 20 reps  Supine Gluteal Sets - 3 x daily - 7 x weekly - 1 sets - 20 reps  Standing Weight Shift Side to Side - 3 x daily - 7 x weekly - 1 sets - 20 reps  Standing L hip abd and ext with B UE support.    Manual Therapy:  Gentle STM L thigh to decrease muscle tightness.    Skilled input: verbal instruction/cues    Writer verbally educated and received verbal consent for hand placement, positioning of patient, and techniques to be performed today from patient for hand placement and palpation for techniques as described above and how they are pertinent to the patient's plan of care.    Home Exercise Program/Education Materials: Access Code: [email or web address]  URL: [email or web address]  Date: 09/13/2021  Prepared by: Mynor Young    Exercises  Supine Ankle Pumps - 6 x daily - 7 x weekly - 1 sets - 20 reps  Supine Quad Set - 3 x daily - 7 x weekly - 1 sets - 20 reps  Supine Gluteal Sets - 3 x daily - 7 x weekly - 1 sets - 20 reps  Standing Weight Shift Side to Side - 3 x daily - 7 x weekly - 1 sets - 20 reps       ASSESSMENT                                                                                                              9/17/21   Decreased L hip pain after MT and passive stretches (reduced from 8/10 to 4/10).  Advised patient to move hip more frequently at home.    At eval on 9/13/21 signs and symptoms consistent with s/p L THR (anterior approach) 9/10/21.        PLAN                                                                                                                           Suggestions for next session as indicated: Progress per plan of care, gentle passive ROM, strengthening, gait, balance.      GOALS                                                                                                                           Long Term Goals: to be met by end of plan of care  1. Decrease L hip pain to minimal to allow patient to ambulate FWB no device.  2. Improve L LE strength to at least 4/5 to allow patient to ascend stairs reciprocally safely and independently.  3. Improve active ROM L hip flexion to at least 100 degrees to allow patient to don and doff shoes and socks independently and without compensation.  4. Independent with HEP.      Therapy procedure time and total treatment time can be found documented on the Time Entry flowsheet   English

## 2022-07-08 NOTE — CONSULT NOTE ADULT - ATTENDING COMMENTS
70 y/o female with PMHx of HLD & COPD presenting with abdominal pain. Admitted for diverticulitis, plan for OR Monday 7/11.   Consulted for medical clearance    Surgery to decide if OR is necessary to treat recurrent episodes of diverticulitis, and coordinate plan with patient and patient's family's wishes.  Cardiology to be consulted to give final clearance.    Jerry Reynolds, Attending Physician

## 2022-07-08 NOTE — ED ADULT NURSE NOTE - OBJECTIVE STATEMENT
Patient is a 72yo female complaining of abdominal pain and constipation Patient states that the pain start Tuesday morning. Patient states she has a history of diverticulitis and was told to come to the hospital for evaluation. Patient denies fever nausea vomiting diarrhea patient denies bloody stools or bloody urine

## 2022-07-08 NOTE — H&P ADULT - NS ATTEND AMEND GEN_ALL_CORE FT
Patient seen and examined in the emergency department.  Has been recently seen by me in the office earlier this week and evaluation for chronic/ recurrent sigmoid diverticulitis.   the patient was asked to undergo an outpatient colonoscopy for completion screening of the colon however her gastroenterologist felt uncomfortable pursuing the colonoscopy given the extent of her diverticular disease for fear of perforation.  The patient was supposed to be scheduled for an outpatient CT virtual colonoscopy however return to the emergency room today with worsening left lower quadrant and suprapubic abdominal pain.  Symptoms began late last night resolved somewhat by this morning but due to her concerns of recurrent diverticulitis she presented to the emergency room today.  Labs are low relatively unremarkable with only mild leukocytosis unchanged from previous encounters.  CT scan was repeated with p.o. and IV contrast which again demonstrates mural thickening of the sigmoid colon with mild inflammatory changes consistent with acute on chronic sigmoid diverticulitis.    Given the patient's recurrent nature of disease and plans for upcoming sigmoid colectomy electively I have offered her admission to the hospital for IV antibiotics appropriate bowel prep and ERAS protocol through the weekend with plans for  laparoscopic sigmoid colectomy  on Monday.   we will consult medicine and cardiology for preop evaluations as the patient has a history of COPD and hyperlipidemia.    She will remain on a clear liquid diet.  ERAS protocols will be initiated for appropriate bowel prep and preoperative analgesia.   Risk, Benefits, and Alternatives to surgery have been discussed.  This includes but is not limited to bleeding, infection, damage to adjacent structures, need for additional surgery or interventions, adverse effects of anesthesia such as cardio-respiratory complications, prolonged intubation, cardiac arrhythmia, arrest, and or death.  Risks of forgoing surgery have also been discussed including progression of, and/or worsening of current condition which may then require urgent or emergent treatment or surgery.    All questions  have been answered.

## 2022-07-08 NOTE — CONSULT NOTE ADULT - PROBLEM SELECTOR RECOMMENDATION 9
Pt presents with abdominal pain   - CT A/P: Focal mural thickening at the proximal sigmoid colon with adjacent stranding and diverticulosis, compatible with diverticulitis. No associated abscess is identified.   - Plan for surgery on Monday 7/11 with Dr. Olivo   - Clear liquid diet   - Plan for bowel prep before surgery, will be NPO after midnight on Sunday    - Continue Zosyn   - Monitor fever and leukocytosis    - Plan per Surgery Pt presents with abdominal pain   - CT A/P: Focal mural thickening at the proximal sigmoid colon with adjacent stranding and diverticulosis, compatible with diverticulitis. No associated abscess is identified.   - Plan for surgery on Monday 7/11 with Dr. Olivo   - Clear liquid diet   - Plan for bowel prep before surgery, will be NPO after midnight on Sunday   - S/p Flagyl and 1L NS bolus x1 in the ED    - Continue Zosyn   - Zofran PRN nausea   - Monitor fever and leukocytosis    - Plan per Surgery  - F/u Cardio clearance     Medical Clearance:  - RCRI: 1, Class II Risk; Pt is low risk for intermediate risk procedure, pt is optimized to proceed with planned procedure with routine hemodynamic monitoring.

## 2022-07-08 NOTE — H&P ADULT - NSHPREVIEWOFSYSTEMS_GEN_ALL_CORE
REVIEW OF SYSTEMS:  CONSTITUTIONAL: admits to weakness, fevers/chills  EYES/ENT: No visual changes; no vertigo or throat pain   NECK: No pain or stiffness  RESPIRATORY: No shortness of breath or dyspnea; no cough, wheezing, hemoptysis  CARDIOVASCULAR: No chest pain or palpitations  GI: admits to lower abdominal pain, & nausea & constipation; vomiting or hematemesis; No diarrhea; no melena or hematochezia  : No dysuria, urgency/frequency or hematuria  NEUROLOGICAL: No numbness or weakness  SKIN: No itching, burning, rashes, or lesions   All other review of systems is negative unless indicated above

## 2022-07-08 NOTE — PATIENT PROFILE ADULT - FALL HARM RISK - RISK INTERVENTIONS

## 2022-07-08 NOTE — CONSULT NOTE ADULT - SUBJECTIVE AND OBJECTIVE BOX
Patient is a 71y old  Female who presents with a chief complaint of diverticulitis (2022 15:21)      FROM ADMISSION H+P:   HPI:  72 y/o female with PMHx of HLD and COPD presents to the ED with worsening lower abdominal pain, she states her pain is intermittent (began in march), rates it a 7/10 at the moment. She also endorses constipation (reports a small BM this morning), fatigue, night chills, bloating, & nausea. Her last colonoscopy was two years ago. Pt states her outpt GI was unwilling to undergo a colonoscopy at this time. Denies chest pain, shortness of breath, vomiting, BRBPR, melena, urinary sx.  (2022 14:41)      ----  INTERVAL HPI/OVERNIGHT EVENTS: Pt seen and evaluated at the bedside. Pt states that       ----  PAST MEDICAL & SURGICAL HISTORY:  Sigmoid diverticulitis      HLD (hyperlipidemia)      COPD (chronic obstructive pulmonary disease)    ----  Allergies    No Known Allergies    Intolerances    ----  REVIEW OF SYSTEMS:   CONSTITUTIONAL: denies fever, chills, admits fatigue   HEENT: denies blurred vision, sore throat  SKIN: denies new lesions, rash  CARDIOVASCULAR: denies chest pain, chest pressure, palpitations  RESPIRATORY: denies shortness of breath, cough, sputum production  GASTROINTESTINAL: admits abdominal pain, nausea, constipation, denies melena or hematochezia  GENITOURINARY: denies dysuria, discharge  NEUROLOGICAL: denies numbness, headache, focal weakness  MUSCULOSKELETAL: denies new joint pain, muscle aches  HEMATOLOGIC: denies gross bleeding, bruising    ----  PHYSICAL EXAM:   GENERAL: patient appears well, no acute distress, appropriately interactive  EYES: sclera clear, no exudates  ENMT: oropharynx clear without erythema, no exudates, moist mucous membranes  NECK: supple, soft  LUNGS: good air entry bilaterally, clear to auscultation, symmetric breath sounds, no wheezing or rhonchi appreciated  HEART: soft S1/S2, regular rate and rhythm, no murmurs noted, no lower extremity edema  GASTROINTESTINAL: abdomen is soft, nontender, nondistended, normoactive bowel sounds  INTEGUMENT: good skin turgor, warm skin, appears well perfused  MUSCULOSKELETAL: no clubbing or cyanosis, no obvious deformity  NEUROLOGIC: awake, alert, oriented x3, good muscle tone in all 4 extremities  HEME/LYMPH: no obvious ecchymosis or petechiae     T(C): 36.9 (22 @ 09:18), Max: 36.9 (22 @ 09:18)  HR: 81 (22 @ 09:18) (81 - 81)  BP: 140/64 (22 @ 09:18) (140/64 - 140/64)  RR: 16 (22 @ 09:18) (16 - 16)  SpO2: 95% (22 @ 09:18) (95% - 95%)  Wt(kg): --    ----  I&O's Summary      LABS:                        12.8   10.65 )-----------( 347      ( 2022 10:35 )             38.2         136  |  103  |  17  ----------------------------<  104<H>  4.4   |  25  |  0.64    Ca    9.0      2022 10:35    TPro  7.4  /  Alb  3.4  /  TBili  0.5  /  DBili  x   /  AST  33  /  ALT  25  /  AlkPhos  61  07-08    PT/INR - ( 2022 14:50 )   PT: 14.4 sec;   INR: 1.23 ratio         PTT - ( 2022 14:50 )  PTT:28.7 sec  Urinalysis Basic - ( 2022 11:45 )    Color: Pale Yellow / Appearance: Clear / S.005 / pH: x  Gluc: x / Ketone: Negative  / Bili: Negative / Urobili: Negative   Blood: x / Protein: Negative / Nitrite: Negative   Leuk Esterase: Trace / RBC: 0-2 /HPF / WBC 0-2   Sq Epi: x / Non Sq Epi: Occasional / Bacteria: x      CAPILLARY BLOOD GLUCOSE    RADIOLOGY:   < from: CT Abdomen and Pelvis w/ IV Cont (22 @ 12:46) >  IMPRESSION:  Focal mural thickening at the proximal sigmoid colon with adjacent   stranding and diverticulosis, compatible with diverticulitis. No   associated abscess is identified. Follow-up colonoscopy after   treatment/resolution of acute disease may be pursued to rule out colon   cancer.    No evidence for a ureteral calculus. Duplicated right collecting system   with mild right hydroureteronephrosis, probably due to vesicoureteral   reflux.          ----  Personally reviewed:  Vital sign trends: [ x ] yes    [  ] no     [  ] n/a  Laboratory results: [ x ] yes    [  ] no     [  ] n/a  Radiology results: [ x ] yes    [  ] no     [  ] n/a  Culture results: [  ] yes    [  ] no     [ x ] n/a  Consultant recommendations: [ x ] yes    [  ] no     [  ] n/a         Patient is a 71y old  Female who presents with a chief complaint of diverticulitis (2022 15:21)      FROM ADMISSION H+P:   HPI:  70 y/o female with PMHx of HLD and COPD presents to the ED with worsening lower abdominal pain, she states her pain is intermittent (began in march), rates it a 7/10 at the moment. She also endorses constipation (reports a small BM this morning), fatigue bloating, & nausea. Her last colonoscopy was two years ago. Pt states her outpt GI was unwilling to undergo a colonoscopy at this time. Denies chest pain, shortness of breath, vomiting, BRBPR, melena, urinary sx.  (2022 14:41)    Pt states that she is very active on a daily basis, walking multiple miles per day with no limitations. Pt admits being told to take an inhaler for her COPD, but is non-compliant as she is asymptomatic. Former smoker, quit 40yrs ago. Denies CP, dyspnea, palpitations, orthopnea. Requires only one pillow to sleep on. Past anesthesia encounter during colonoscopy a few years ago at which time she had no complications with anesthesia including nausea, vomiting. Pt has no chipped or cracked teeth; no dentures. Denies family history of malignant hyperthermia.  EKG shows NSR at 75BPM. QTC: 439ms.  ----  PAST MEDICAL & SURGICAL HISTORY:  Sigmoid diverticulitis      HLD (hyperlipidemia)      COPD (chronic obstructive pulmonary disease)    ----  Allergies    No Known Allergies    Intolerances    ----  REVIEW OF SYSTEMS:   CONSTITUTIONAL: denies fever, chills, admits fatigue   HEENT: denies blurred vision, sore throat  SKIN: denies new lesions, rash  CARDIOVASCULAR: denies chest pain, chest pressure, palpitations  RESPIRATORY: denies shortness of breath, cough, sputum production  GASTROINTESTINAL: admits RLQ > LLQ abdominal pain, nausea, constipation, denies melena or hematochezia  GENITOURINARY: denies dysuria, discharge  NEUROLOGICAL: denies numbness, headache, focal weakness  MUSCULOSKELETAL: denies new joint pain, muscle aches  HEMATOLOGIC: denies gross bleeding, bruising    ----  PHYSICAL EXAM:   GENERAL: patient appears well, admits to anxiety about being in the hospital setting and regarding her upcoming surgery; appropriately interactive during interview, wearing glasses  EYES: sclera clear, no exudates  ENMT: oropharynx clear without erythema, no exudates, moist mucous membranes  NECK: supple, soft  LUNGS: non-labored breathing, no accessory muscle use, good air entry bilaterally, clear to auscultation, symmetric breath sounds, no wheezing or rhonchi appreciated  HEART: soft S1/S2, regular rate and rhythm, no murmurs noted, no lower extremity edema  GASTROINTESTINAL: abdomen is soft, nontender, nondistended, normoactive bowel sounds, pain upon palpation in BL lower quadrants  INTEGUMENT: good skin turgor, warm skin, appears well perfused  MUSCULOSKELETAL: no clubbing or cyanosis, no obvious deformity  NEUROLOGIC: awake, alert, good muscle tone in all 4 extremities  HEME/LYMPH: no obvious ecchymosis or petechiae     T(C): 36.9 (22 @ 09:18), Max: 36.9 (22 @ 09:18)  HR: 81 (22 @ 09:18) (81 - 81)  BP: 140/64 (22 @ 09:18) (140/64 - 140/64)  RR: 16 (22 @ 09:18) (16 - 16)  SpO2: 95% (22 @ 09:18) (95% - 95%)  Wt(kg): --    ----  I&O's Summary      LABS:                        12.8   10.65 )-----------( 347      ( 2022 10:35 )             38.2         136  |  103  |  17  ----------------------------<  104<H>  4.4   |  25  |  0.64    Ca    9.0      2022 10:35    TPro  7.4  /  Alb  3.4  /  TBili  0.5  /  DBili  x   /  AST  33  /  ALT  25  /  AlkPhos  61  07-08    PT/INR - ( 2022 14:50 )   PT: 14.4 sec;   INR: 1.23 ratio         PTT - ( 2022 14:50 )  PTT:28.7 sec  Urinalysis Basic - ( 2022 11:45 )    Color: Pale Yellow / Appearance: Clear / S.005 / pH: x  Gluc: x / Ketone: Negative  / Bili: Negative / Urobili: Negative   Blood: x / Protein: Negative / Nitrite: Negative   Leuk Esterase: Trace / RBC: 0-2 /HPF / WBC 0-2   Sq Epi: x / Non Sq Epi: Occasional / Bacteria: x      CAPILLARY BLOOD GLUCOSE    RADIOLOGY:   < from: CT Abdomen and Pelvis w/ IV Cont (22 @ 12:46) >  IMPRESSION:  Focal mural thickening at the proximal sigmoid colon with adjacent   stranding and diverticulosis, compatible with diverticulitis. No   associated abscess is identified. Follow-up colonoscopy after   treatment/resolution of acute disease may be pursued to rule out colon   cancer.    No evidence for a ureteral calculus. Duplicated right collecting system   with mild right hydroureteronephrosis, probably due to vesicoureteral   reflux.          ----  Personally reviewed:  Vital sign trends: [ x ] yes    [  ] no     [  ] n/a  Laboratory results: [ x ] yes    [  ] no     [  ] n/a  Radiology results: [ x ] yes    [  ] no     [  ] n/a  Culture results: [  ] yes    [  ] no     [ x ] n/a  Consultant recommendations: [ x ] yes    [  ] no     [  ] n/a         Patient is a 71y old  Female who presents with a chief complaint of diverticulitis (2022 15:21)      FROM ADMISSION H+P:   HPI:  70 y/o female with PMHx of HLD and COPD presents to the ED with worsening lower abdominal pain, she states her pain is intermittent (began in march), rates it a 7/10 at the moment. She also endorses constipation (reports a small BM this morning), fatigue bloating, & nausea. Her last colonoscopy was two years ago. Pt states her outpt GI was unwilling to undergo a colonoscopy at this time. Denies chest pain, shortness of breath, vomiting, BRBPR, melena, urinary sx.  (2022 14:41)    Pt states that she is very active on a daily basis, walking multiple miles per day with no limitations. METS>4. Pt admits being told to take an inhaler for her COPD, but is non-compliant as she is asymptomatic. Former smoker, quit 40yrs ago. Denies CP, dyspnea, palpitations, orthopnea. Requires only one pillow to sleep on. Past anesthesia encounter during colonoscopy a few years ago at which time she had no complications with anesthesia including nausea, vomiting. Pt has no chipped or cracked teeth; no dentures. Denies family history of malignant hyperthermia.  EKG shows NSR at 75BPM. QTC: 439ms.  ----  PAST MEDICAL & SURGICAL HISTORY:  Sigmoid diverticulitis      HLD (hyperlipidemia)      COPD (chronic obstructive pulmonary disease)    ----  Allergies    No Known Allergies    Intolerances    ----  REVIEW OF SYSTEMS:   CONSTITUTIONAL: denies fever, chills, admits fatigue   HEENT: denies blurred vision, sore throat  SKIN: denies new lesions, rash  CARDIOVASCULAR: denies chest pain, chest pressure, palpitations  RESPIRATORY: denies shortness of breath, cough, sputum production  GASTROINTESTINAL: admits RLQ > LLQ abdominal pain, nausea, constipation, denies melena or hematochezia  GENITOURINARY: denies dysuria, discharge  NEUROLOGICAL: denies numbness, headache, focal weakness  MUSCULOSKELETAL: denies new joint pain, muscle aches  HEMATOLOGIC: denies gross bleeding, bruising    ----  PHYSICAL EXAM:   GENERAL: patient appears well, admits to anxiety about being in the hospital setting and regarding her upcoming surgery; appropriately interactive during interview, wearing glasses  EYES: sclera clear, no exudates  ENMT: oropharynx clear without erythema, no exudates, moist mucous membranes  NECK: supple, soft  LUNGS: non-labored breathing, no accessory muscle use, good air entry bilaterally, clear to auscultation, symmetric breath sounds, no wheezing or rhonchi appreciated  HEART: soft S1/S2, regular rate and rhythm, no murmurs noted, no lower extremity edema  GASTROINTESTINAL: abdomen is soft, nontender, nondistended, normoactive bowel sounds, pain upon palpation in BL lower quadrants  INTEGUMENT: good skin turgor, warm skin, appears well perfused  MUSCULOSKELETAL: no clubbing or cyanosis, no obvious deformity  NEUROLOGIC: awake, alert, good muscle tone in all 4 extremities  HEME/LYMPH: no obvious ecchymosis or petechiae     T(C): 36.9 (22 @ 09:18), Max: 36.9 (22 @ 09:18)  HR: 81 (22 @ 09:18) (81 - 81)  BP: 140/64 (22 @ 09:18) (140/64 - 140/64)  RR: 16 (22 @ 09:18) (16 - 16)  SpO2: 95% (22 @ 09:18) (95% - 95%)  Wt(kg): --    ----  I&O's Summary      LABS:                        12.8   10.65 )-----------( 347      ( 2022 10:35 )             38.2         136  |  103  |  17  ----------------------------<  104<H>  4.4   |  25  |  0.64    Ca    9.0      2022 10:35    TPro  7.4  /  Alb  3.4  /  TBili  0.5  /  DBili  x   /  AST  33  /  ALT  25  /  AlkPhos  61      PT/INR - ( 2022 14:50 )   PT: 14.4 sec;   INR: 1.23 ratio         PTT - ( 2022 14:50 )  PTT:28.7 sec  Urinalysis Basic - ( 2022 11:45 )    Color: Pale Yellow / Appearance: Clear / S.005 / pH: x  Gluc: x / Ketone: Negative  / Bili: Negative / Urobili: Negative   Blood: x / Protein: Negative / Nitrite: Negative   Leuk Esterase: Trace / RBC: 0-2 /HPF / WBC 0-2   Sq Epi: x / Non Sq Epi: Occasional / Bacteria: x      CAPILLARY BLOOD GLUCOSE    RADIOLOGY:   < from: CT Abdomen and Pelvis w/ IV Cont (22 @ 12:46) >  IMPRESSION:  Focal mural thickening at the proximal sigmoid colon with adjacent   stranding and diverticulosis, compatible with diverticulitis. No   associated abscess is identified. Follow-up colonoscopy after   treatment/resolution of acute disease may be pursued to rule out colon   cancer.    No evidence for a ureteral calculus. Duplicated right collecting system   with mild right hydroureteronephrosis, probably due to vesicoureteral   reflux.          ----  Personally reviewed:  Vital sign trends: [ x ] yes    [  ] no     [  ] n/a  Laboratory results: [ x ] yes    [  ] no     [  ] n/a  Radiology results: [ x ] yes    [  ] no     [  ] n/a  Culture results: [  ] yes    [  ] no     [ x ] n/a  Consultant recommendations: [ x ] yes    [  ] no     [  ] n/a

## 2022-07-08 NOTE — ED PROVIDER NOTE - CLINICAL SUMMARY MEDICAL DECISION MAKING FREE TEXT BOX
71 female history of diverticulitis here with left lower quadrant pain and change in stools–patient reports more constipation, but had small BM this morning.  Check labs, treat symptoms, get CT.

## 2022-07-08 NOTE — CONSULT NOTE ADULT - ASSESSMENT
72 y/o female with PMHx of HLD & COPD presenting with abdominal pain, found to have active diverticulitis on CT. Cardiology consulted for preop in anticipation of lap sigmoid colectomy on 7/11/22.    Preop  - Denies chest pain, palpitation, SOB, syncope, dizziness, lightheadedness, orthopnea, PND, HUGHES and edema  - EKG demonstrates NSR. No acute ischemic changes noted.   - Telemetry shows no arrhythmias  - Patient euvolemic on examination with no overt signs of heart failure or cardiac ischemia.   - No severe valvular abnormalities noted on examination  - Would check routine ECHO to r/o valvular abnormalities and to assess for pulmonary hypertension and heart function.   - Pt has no active ischemia, decompensated heart failure, unstable arrythmia, or severe stenotic valvular disease. Patient is optimized from cardiovascular standpoint to proceed with planned procedure with routine hemodynamic monitoring.   - Monitor and replete lytes, keep K>4, Mg>2.  - Will continue to follow.    Radha Hernandez NP  Nurse Practitioner- Cardiology   Spectra #[phone] 72 y/o female with PMHx of HLD & COPD presenting with abdominal pain, found to have active diverticulitis on CT. Cardiology consulted for preop in anticipation of lap sigmoid colectomy on 7/11/22.    Preop  - Denies chest pain, palpitation, SOB orthopnea, PND, HUGHES and edema  - EKG demonstrates NSR. No acute ischemic changes noted.   - Telemetry shows no arrhythmias  - Patient euvolemic on examination with no overt signs of heart failure or cardiac ischemia.   - No severe valvular abnormalities noted on examination  - Would check routine ECHO to r/o valvular abnormalities and to assess for pulmonary hypertension and heart function.   - Pt has no active ischemia, decompensated heart failure, unstable arrythmia, or severe stenotic valvular disease. Patient is optimized from cardiovascular standpoint to proceed with planned procedure with routine hemodynamic monitoring.   - Monitor and replete lytes, keep K>4, Mg>2.  - Will continue to follow.    Radha Hernandez NP  Nurse Practitioner- Cardiology   Spectra #[phone] 70 y/o female with PMHx of HLD & COPD presenting with abdominal pain, found to have active diverticulitis on CT. Cardiology consulted for preop in anticipation of lap sigmoid colectomy on 7/11/22.    Preop, HLD  - Denies chest pain, palpitation, SOB orthopnea, PND, HUGHES and edema  - EKG demonstrates NSR. No acute ischemic changes noted.   - Telemetry shows no arrhythmias  - Patient euvolemic on examination with no overt signs of heart failure or cardiac ischemia.   - No severe valvular abnormalities noted on examination  - Would check routine ECHO to r/o valvular abnormalities and to assess for pulmonary hypertension and heart function.   - Pt has no active ischemia, decompensated heart failure, unstable arrythmia, or severe stenotic valvular disease. Patient is optimized from cardiovascular standpoint to proceed with planned procedure with routine hemodynamic monitoring.   - continue statin  - Monitor and replete lytes, keep K>4, Mg>2.  - Will continue to follow.    Radha Hernandez NP  Nurse Practitioner- Cardiology   Spectra #[phone]

## 2022-07-09 ENCOUNTER — TRANSCRIPTION ENCOUNTER (OUTPATIENT)
Age: 72
End: 2022-07-09

## 2022-07-09 LAB
ANION GAP SERPL CALC-SCNC: 9 MMOL/L — SIGNIFICANT CHANGE UP (ref 5–17)
BASOPHILS # BLD AUTO: 0.05 K/UL — SIGNIFICANT CHANGE UP (ref 0–0.2)
BASOPHILS NFR BLD AUTO: 0.7 % — SIGNIFICANT CHANGE UP (ref 0–2)
BUN SERPL-MCNC: 8 MG/DL — SIGNIFICANT CHANGE UP (ref 7–23)
CALCIUM SERPL-MCNC: 9 MG/DL — SIGNIFICANT CHANGE UP (ref 8.5–10.1)
CHLORIDE SERPL-SCNC: 107 MMOL/L — SIGNIFICANT CHANGE UP (ref 96–108)
CO2 SERPL-SCNC: 24 MMOL/L — SIGNIFICANT CHANGE UP (ref 22–31)
CREAT SERPL-MCNC: 0.51 MG/DL — SIGNIFICANT CHANGE UP (ref 0.5–1.3)
EGFR: 99 ML/MIN/1.73M2 — SIGNIFICANT CHANGE UP
EOSINOPHIL # BLD AUTO: 0.09 K/UL — SIGNIFICANT CHANGE UP (ref 0–0.5)
EOSINOPHIL NFR BLD AUTO: 1.3 % — SIGNIFICANT CHANGE UP (ref 0–6)
GLUCOSE SERPL-MCNC: 158 MG/DL — HIGH (ref 70–99)
HCT VFR BLD CALC: 38 % — SIGNIFICANT CHANGE UP (ref 34.5–45)
HCV AB S/CO SERPL IA: 0.08 S/CO — SIGNIFICANT CHANGE UP (ref 0–0.99)
HCV AB SERPL-IMP: SIGNIFICANT CHANGE UP
HGB BLD-MCNC: 12.4 G/DL — SIGNIFICANT CHANGE UP (ref 11.5–15.5)
IMM GRANULOCYTES NFR BLD AUTO: 0.3 % — SIGNIFICANT CHANGE UP (ref 0–1.5)
LYMPHOCYTES # BLD AUTO: 0.74 K/UL — LOW (ref 1–3.3)
LYMPHOCYTES # BLD AUTO: 10.7 % — LOW (ref 13–44)
MCHC RBC-ENTMCNC: 29.8 PG — SIGNIFICANT CHANGE UP (ref 27–34)
MCHC RBC-ENTMCNC: 32.6 GM/DL — SIGNIFICANT CHANGE UP (ref 32–36)
MCV RBC AUTO: 91.3 FL — SIGNIFICANT CHANGE UP (ref 80–100)
MONOCYTES # BLD AUTO: 0.44 K/UL — SIGNIFICANT CHANGE UP (ref 0–0.9)
MONOCYTES NFR BLD AUTO: 6.4 % — SIGNIFICANT CHANGE UP (ref 2–14)
NEUTROPHILS # BLD AUTO: 5.55 K/UL — SIGNIFICANT CHANGE UP (ref 1.8–7.4)
NEUTROPHILS NFR BLD AUTO: 80.6 % — HIGH (ref 43–77)
NRBC # BLD: 0 /100 WBCS — SIGNIFICANT CHANGE UP (ref 0–0)
PLATELET # BLD AUTO: 350 K/UL — SIGNIFICANT CHANGE UP (ref 150–400)
POTASSIUM SERPL-MCNC: 3.6 MMOL/L — SIGNIFICANT CHANGE UP (ref 3.5–5.3)
POTASSIUM SERPL-SCNC: 3.6 MMOL/L — SIGNIFICANT CHANGE UP (ref 3.5–5.3)
RBC # BLD: 4.16 M/UL — SIGNIFICANT CHANGE UP (ref 3.8–5.2)
RBC # FLD: 13.7 % — SIGNIFICANT CHANGE UP (ref 10.3–14.5)
SODIUM SERPL-SCNC: 140 MMOL/L — SIGNIFICANT CHANGE UP (ref 135–145)
WBC # BLD: 6.89 K/UL — SIGNIFICANT CHANGE UP (ref 3.8–10.5)
WBC # FLD AUTO: 6.89 K/UL — SIGNIFICANT CHANGE UP (ref 3.8–10.5)

## 2022-07-09 PROCEDURE — 99232 SBSQ HOSP IP/OBS MODERATE 35: CPT

## 2022-07-09 PROCEDURE — 99233 SBSQ HOSP IP/OBS HIGH 50: CPT | Mod: GC

## 2022-07-09 RX ORDER — ACETAMINOPHEN 500 MG
1000 TABLET ORAL ONCE
Refills: 0 | Status: COMPLETED | OUTPATIENT
Start: 2022-07-10 | End: 2022-07-10

## 2022-07-09 RX ORDER — SOD SULF/SODIUM/NAHCO3/KCL/PEG
2000 SOLUTION, RECONSTITUTED, ORAL ORAL ONCE
Refills: 0 | Status: COMPLETED | OUTPATIENT
Start: 2022-07-09 | End: 2022-07-09

## 2022-07-09 RX ORDER — TRAZODONE HCL 50 MG
100 TABLET ORAL AT BEDTIME
Refills: 0 | Status: DISCONTINUED | OUTPATIENT
Start: 2022-07-09 | End: 2022-07-11

## 2022-07-09 RX ORDER — LANOLIN ALCOHOL/MO/W.PET/CERES
5 CREAM (GRAM) TOPICAL AT BEDTIME
Refills: 0 | Status: DISCONTINUED | OUTPATIENT
Start: 2022-07-09 | End: 2022-07-11

## 2022-07-09 RX ADMIN — SODIUM CHLORIDE 70 MILLILITER(S): 9 INJECTION INTRAMUSCULAR; INTRAVENOUS; SUBCUTANEOUS at 23:10

## 2022-07-09 RX ADMIN — Medication 100 MILLIGRAM(S): at 23:03

## 2022-07-09 RX ADMIN — Medication 2000 MILLILITER(S): at 15:42

## 2022-07-09 RX ADMIN — PIPERACILLIN AND TAZOBACTAM 25 GRAM(S): 4; .5 INJECTION, POWDER, LYOPHILIZED, FOR SOLUTION INTRAVENOUS at 09:22

## 2022-07-09 RX ADMIN — HEPARIN SODIUM 5000 UNIT(S): 5000 INJECTION INTRAVENOUS; SUBCUTANEOUS at 23:10

## 2022-07-09 RX ADMIN — PIPERACILLIN AND TAZOBACTAM 25 GRAM(S): 4; .5 INJECTION, POWDER, LYOPHILIZED, FOR SOLUTION INTRAVENOUS at 00:43

## 2022-07-09 RX ADMIN — PIPERACILLIN AND TAZOBACTAM 25 GRAM(S): 4; .5 INJECTION, POWDER, LYOPHILIZED, FOR SOLUTION INTRAVENOUS at 23:04

## 2022-07-09 RX ADMIN — HEPARIN SODIUM 5000 UNIT(S): 5000 INJECTION INTRAVENOUS; SUBCUTANEOUS at 05:12

## 2022-07-09 RX ADMIN — PIPERACILLIN AND TAZOBACTAM 25 GRAM(S): 4; .5 INJECTION, POWDER, LYOPHILIZED, FOR SOLUTION INTRAVENOUS at 15:42

## 2022-07-09 RX ADMIN — HEPARIN SODIUM 5000 UNIT(S): 5000 INJECTION INTRAVENOUS; SUBCUTANEOUS at 15:41

## 2022-07-09 NOTE — DISCHARGE NOTE PROVIDER - CARE PROVIDERS DIRECT ADDRESSES
[email or web address]

## 2022-07-09 NOTE — PROGRESS NOTE ADULT - SUBJECTIVE AND OBJECTIVE BOX
[facility] -- Wu Moreno Grossman, Wachsman, Ameya Garay Savella, Goodger: Office # [phone]    Follow Up:  Cardiac clearance, HLD    Subjective/Observations: Patient awake, alert, resting in bed. Denies chest pain, palpitations and dizziness. Denies any difficulty breathing. No orthopnea and PND. Tolerating room air. IVF @ 70    REVIEW OF SYSTEMS: All other review of systems are negative unless indicated above    PAST MEDICAL & SURGICAL HISTORY:  Sigmoid diverticulitis      HLD (hyperlipidemia)      COPD (chronic obstructive pulmonary disease)    MEDICATIONS  (STANDING):  acetaminophen   IVPB .. 1000 milliGRAM(s) IV Intermittent once  acetaminophen   IVPB .. 1000 milliGRAM(s) IV Intermittent once  atorvastatin 40 milliGRAM(s) Oral at bedtime  heparin   Injectable 5000 Unit(s) SubCutaneous every 8 hours  melatonin 5 milliGRAM(s) Oral at bedtime  piperacillin/tazobactam IVPB.. 3.375 Gram(s) IV Intermittent every 8 hours  polyethylene glycol/electrolyte Solution 2000 milliLiter(s) Oral once  sodium chloride 0.9%. 1000 milliLiter(s) (70 mL/Hr) IV Continuous <Continuous>    MEDICATIONS  (PRN):  HYDROmorphone   Tablet 0.5 milliGRAM(s) Oral every 4 hours PRN Severe Pain (7 - 10)  ketorolac   Injectable 15 milliGRAM(s) IV Push every 6 hours PRN Moderate Pain (4 - 6)  ondansetron Injectable 4 milliGRAM(s) IV Push every 6 hours PRN Nausea    Allergies  No Known Allergies    Vital Signs Last 24 Hrs  T(C): 36.4 (09 Jul 2022 05:32), Max: 36.7 (08 Jul 2022 22:42)  T(F): 97.5 (09 Jul 2022 05:32), Max: 98.1 (08 Jul 2022 22:42)  HR: 74 (09 Jul 2022 05:32) (69 - 76)  BP: 160/81 (09 Jul 2022 05:32) (116/67 - 160/81)  BP(mean): --  RR: 18 (09 Jul 2022 05:32) (16 - 18)  SpO2: 97% (09 Jul 2022 05:32) (95% - 99%)    Parameters below as of 09 Jul 2022 05:32  Patient On (Oxygen Delivery Method): room air      I&O's Summary        TELE: Not on telemetry   PHYSICAL EXAM:  Constitutional: NAD, awake and alert, Well developed   HEENT: Moist Mucous Membranes, Anicteric  Pulmonary: Non-labored, breath sounds are clear bilaterally, No wheezing, rales or rhonchi  Cardiovascular: Regular, S1 and S2, No murmurs, No rubs, gallops or clicks  Gastrointestinal:  soft, nontender, nondistended   Lymph: No peripheral edema. No lymphadenopathy.   Skin: No visible rashes or ulcers.  Psych:  Mood & affect appropriate      LABS: All Labs Reviewed:                        12.8   10.65 )-----------( 347      ( 08 Jul 2022 10:35 )             38.2     08 Jul 2022 10:35    136    |  103    |  17     ----------------------------<  104    4.4     |  25     |  0.64     Ca    9.0        08 Jul 2022 10:35    TPro  7.4    /  Alb  3.4    /  TBili  0.5    /  DBili  x      /  AST  33     /  ALT  25     /  AlkPhos  61     08 Jul 2022 10:35   LIVER FUNCTIONS - ( 08 Jul 2022 10:35 )  Alb: 3.4 g/dL / Pro: 7.4 g/dL / ALK PHOS: 61 U/L / ALT: 25 U/L / AST: 33 U/L / GGT: x           PT/INR - ( 08 Jul 2022 14:50 )   PT: 14.4 sec;   INR: 1.23 ratio         PTT - ( 08 Jul 2022 14:50 )  PTT:28.7 sec

## 2022-07-09 NOTE — DISCHARGE NOTE PROVIDER - CARE PROVIDER_API CALL
Darryn Olivo)  Surgery  [address] [place] [address]  Phone: [phone]  Fax: [phone]  Follow Up Time:

## 2022-07-09 NOTE — PROGRESS NOTE ADULT - SUBJECTIVE AND OBJECTIVE BOX
Patient is a 71y old  Female who presents with a chief complaint of diverticulitis (2022 13:57)      INTERVAL HPI/OVERNIGHT EVENTS:     T(C): 36.9 (22 @ 13:20), Max: 36.9 (22 @ 13:20)  HR: 81 (22 @ 13:20) (69 - 81)  BP: 158/72 (22 @ 13:20) (116/67 - 160/81)  RR: 18 (22 @ 13:20) (16 - 18)  SpO2: 96% (22 @ 13:20) (95% - 99%)  Wt(kg): --  I&O's Summary      REVIEW OF SYSTEMS:  CONSTITUTIONAL: No fever, weight loss, or fatigue  EYES: No eye pain, visual disturbances, or discharge  ENMT:  No difficulty hearing, tinnitus, vertigo; No sinus or throat pain  NECK: No pain or stiffness  BREASTS: No pain, no masses  RESPIRATORY: No cough, wheezing, chills or hemoptysis; No shortness of breath  CARDIOVASCULAR: No chest pain, palpitations, dizziness, or leg swelling  GASTROINTESTINAL: No abdominal or epigastric pain. No nausea, vomiting, or hematemesis; No diarrhea or constipation. No melena or hematochezia.  GENITOURINARY: No dysuria, frequency, hematuria, or incontinence  NEUROLOGICAL: No headaches, memory loss, loss of strength, numbness, or tremors  SKIN: No itching, burning, rashes, or lesions   MUSCULOSKELETAL: No joint pain or swelling; No muscle, back, or extremity pain    PHYSICAL EXAM:  GENERAL: NAD, well-groomed, well-developed  HEAD:  Atraumatic, Normocephalic  EYES: EOMI, PERRLA, conjunctiva and sclera clear  ENMT: No tonsillar erythema, exudates, or enlargement; Moist mucous membranes  NECK: Supple, No JVD  NERVOUS SYSTEM:  Alert & Oriented X3; Motor Strength 5/5 B/L upper and lower extremities; DTRs 2+ intact and symmetric  CHEST/LUNG: Clear to percussion bilaterally; No rales, rhonchi, wheezing, or rubs  HEART: Regular rate and rhythm; No murmurs, rubs, or gallops  ABDOMEN: Soft, Nontender, Nondistended; Bowel sounds present  EXTREMITIES:  2+ Peripheral Pulses, No clubbing, cyanosis, or edema  SKIN: No rashes or lesions    MEDICATIONS  (STANDING):  acetaminophen   IVPB .. 1000 milliGRAM(s) IV Intermittent once  acetaminophen   IVPB .. 1000 milliGRAM(s) IV Intermittent once  atorvastatin 40 milliGRAM(s) Oral at bedtime  heparin   Injectable 5000 Unit(s) SubCutaneous every 8 hours  melatonin 5 milliGRAM(s) Oral at bedtime  piperacillin/tazobactam IVPB.. 3.375 Gram(s) IV Intermittent every 8 hours  sodium chloride 0.9%. 1000 milliLiter(s) (70 mL/Hr) IV Continuous <Continuous>    MEDICATIONS  (PRN):  HYDROmorphone   Tablet 0.5 milliGRAM(s) Oral every 4 hours PRN Severe Pain (7 - 10)  ketorolac   Injectable 15 milliGRAM(s) IV Push every 6 hours PRN Moderate Pain (4 - 6)  ondansetron Injectable 4 milliGRAM(s) IV Push every 6 hours PRN Nausea      LABS:                        12.4   6.89  )-----------( 350      ( 2022 08:33 )             38.0     07-09    140  |  107  |  8   ----------------------------<  158<H>  3.6   |  24  |  0.51    Ca    9.0      2022 08:33    TPro  7.4  /  Alb  3.4  /  TBili  0.5  /  DBili  x   /  AST  33  /  ALT  25  /  AlkPhos  61  07-08    PT/INR - ( 2022 14:50 )   PT: 14.4 sec;   INR: 1.23 ratio         PTT - ( 2022 14:50 )  PTT:28.7 sec  Urinalysis Basic - ( 2022 11:45 )    Color: Pale Yellow / Appearance: Clear / S.005 / pH: x  Gluc: x / Ketone: Negative  / Bili: Negative / Urobili: Negative   Blood: x / Protein: Negative / Nitrite: Negative   Leuk Esterase: Trace / RBC: 0-2 /HPF / WBC 0-2   Sq Epi: x / Non Sq Epi: Occasional / Bacteria: x      CAPILLARY BLOOD GLUCOSE                  RADIOLOGY & ADDITIONAL TESTS:    Imaging Personally Reviewed:       Advance Directives:      Palliative Care:  Appropriate     Patient is a 71y old  Female who presents with a chief complaint of diverticulitis (2022 13:57)  medical consult for surgery   pt seen and examine today  state abd pain   better , on  clear liquid diet , no fever .     INTERVAL HPI/OVERNIGHT EVENTS:     T(C): 36.9 (22 @ 13:20), Max: 36.9 (22 @ 13:20)  HR: 81 (22 @ 13:20) (69 - 81)  BP: 158/72 (22 @ 13:20) (116/67 - 160/81)  RR: 18 (22 @ 13:20) (16 - 18)  SpO2: 96% (22 @ 13:20) (95% - 99%)  Wt(kg): --  I&O's Summary      REVIEW OF SYSTEMS:  CONSTITUTIONAL: No fever, weight loss, or fatigue  EYES: No eye pain, visual disturbances, or discharge  ENMT:  No difficulty hearing, tinnitus,   NECK: No pain or stiffness  BREASTS: No pain, no masses  RESPIRATORY: No cough, wheezing, chills   No shortness of breath  CARDIOVASCULAR: No chest pain, palpitations, dizziness, or leg swelling  GASTROINTESTINAL: No abdominal or epigastric pain. No nausea, vomiting No diarrhea or constipation.   GENITOURINARY: No dysuria, frequency, hematuria, or incontinence  NEUROLOGICAL: No headaches, memory loss, loss of strength, numbness, or tremors  SKIN: No itching, burning, rashes, or lesions   MUSCULOSKELETAL: No joint pain or swelling; No muscle, back, or extremity pain    PHYSICAL EXAM:  GENERAL: NAD, well-groomed, well-developed  HEAD:  Atraumatic, Normocephalic  EYES: EOMI, PERRLA, conjunctiva and sclera clear  ENMT:  Moist mucous membranes  NECK: Supple,  NERVOUS SYSTEM:  Alert & Oriented X3; Motor Strength 5/5 B/L upper and lower extremities; DTRs 2+ intact and symmetric  CHEST/LUNG:   percussion bilaterally; No rales, rhonchi, wheezing,   HEART: Regular rate and rhythm; No murmurs,  no tachy   ABDOMEN: Soft, mild tender, Nondistended; Bowel sounds present  EXTREMITIES:  2+ Peripheral Pulses, No clubbing, cyanosis, or edema  SKIN: No rashes or lesions    MEDICATIONS  (STANDING):  acetaminophen   IVPB .. 1000 milliGRAM(s) IV Intermittent once  acetaminophen   IVPB .. 1000 milliGRAM(s) IV Intermittent once  atorvastatin 40 milliGRAM(s) Oral at bedtime  heparin   Injectable 5000 Unit(s) SubCutaneous every 8 hours  melatonin 5 milliGRAM(s) Oral at bedtime  piperacillin/tazobactam IVPB.. 3.375 Gram(s) IV Intermittent every 8 hours  sodium chloride 0.9%. 1000 milliLiter(s) (70 mL/Hr) IV Continuous <Continuous>    MEDICATIONS  (PRN):  HYDROmorphone   Tablet 0.5 milliGRAM(s) Oral every 4 hours PRN Severe Pain (7 - 10)  ketorolac   Injectable 15 milliGRAM(s) IV Push every 6 hours PRN Moderate Pain (4 - 6)  ondansetron Injectable 4 milliGRAM(s) IV Push every 6 hours PRN Nausea      LABS:                        12.4   6.89  )-----------( 350      ( 2022 08:33 )             38.0     07-09    140  |  107  |  8   ----------------------------<  158<H>  3.6   |  24  |  0.51    Ca    9.0      2022 08:33    TPro  7.4  /  Alb  3.4  /  TBili  0.5  /  DBili  x   /  AST  33  /  ALT  25  /  AlkPhos  61  07-08    PT/INR - ( 2022 14:50 )   PT: 14.4 sec;   INR: 1.23 ratio         PTT - ( 2022 14:50 )  PTT:28.7 sec  Urinalysis Basic - ( 2022 11:45 )    Color: Pale Yellow / Appearance: Clear / S.005 / pH: x  Gluc: x / Ketone: Negative  / Bili: Negative / Urobili: Negative   Blood: x / Protein: Negative / Nitrite: Negative   Leuk Esterase: Trace / RBC: 0-2 /HPF / WBC 0-2   Sq Epi: x / Non Sq Epi: Occasional / Bacteria: x                    RADIOLOGY & ADDITIONAL TESTS:    Imaging Personally Reviewed:     no new test   Advance Directives:  full code     Palliative Care:  Appropriate

## 2022-07-09 NOTE — PROGRESS NOTE ADULT - ASSESSMENT
70 y/o female with PMHx of HLD & COPD presenting with abdominal pain, found to have active diverticulitis on CT. Cardiology consulted for preop in anticipation of lap sigmoid colectomy on 7/11/22.    Preop, HLD  - Denies chest pain, palpitation, SOB orthopnea, PND, HUGHES and edema  - EKG demonstrates NSR. No acute ischemic changes noted.   - Patient euvolemic on examination with no overt signs of heart failure or cardiac ischemia.   - No severe valvular abnormalities noted on examination  - No evidence of significant arrhythmia   - Would check routine ECHO to r/o valvular abnormalities and to assess for pulmonary hypertension and heart function.   - Pt has no active ischemia, decompensated heart failure, unstable arrythmia, or severe stenotic valvular disease. Patient is optimized from cardiovascular standpoint to proceed with planned procedure with routine hemodynamic monitoring.   - Continue statin    - Monitor and replete lytes, keep K>4, Mg>2.  - Will continue to follow.    Laisha Olvera, MS FNP, [facility]  Nurse Practitioner- Cardiology   Spectra #[id number] /[phone]

## 2022-07-09 NOTE — PROGRESS NOTE ADULT - ASSESSMENT
70 y/o female with PMHx of HLD & COPD presenting with abdominal pain, found to have active diverticulitis on CT.    PLAN:  - Plan for OR Monday 7/11 with Dr. Olivo; medical & cardiac clearance on chart  - Continue clear liquid diet & IVF  - Continue Zosyn  - ERAS protocol  - Continue pain control as needed  - Monitor bowel function/serial abdominal exams  - Bowel prep this evening, tap water enema tomorrow night  - Daily labs  - DVT Prophylaxis with subq heparin & SCDs  - Above to be discussed with Dr. Vargas (covering for Dr. Olivo)    Surgical Team  Spectralink: [id number] 70 y/o female with PMHx of HLD & COPD presenting with abdominal pain, found to have active diverticulitis on CT.    PLAN:  - Plan for OR Monday 7/11 with Dr. Olivo; medical & cardiac clearance on chart  - Continue clear liquid diet & IVF  - Continue Zosyn  - ERAS protocol  - Continue pain control as needed  - Monitor bowel function/serial abdominal exams  - Bowel prep this evening, tap water enema tomorrow night  - Daily labs  - DVT Prophylaxis with subq heparin & SCDs  - Above to be discussed with Dr. Vargas (covering for Dr. Olivo)    Pt seen all images reviewed.  Abdomen reveals minimal LLQ pain.  Ceferino clears bowel prep tomorrow.     Surgical Team  Spectralink: [phone]

## 2022-07-09 NOTE — DISCHARGE NOTE PROVIDER - NSDCFUADDINST_GEN_ALL_CORE_FT
You may take over the counter pain medication such as tylenol or ibuprofen as directed as needed.  No heavy lifting over 15 lbs.  Keep steri-strips clean, dry and intact. You may shower as usual but Do NOT remove steri-strips. They may fall off on their own.  Do not scrub or soak incision site. Pat dry. NO tub baths, NO swimming pools. No hot tubs.

## 2022-07-09 NOTE — PROGRESS NOTE ADULT - ASSESSMENT
72 y/o female with PMHx of HLD & COPD presenting with abdominal pain. Admitted for diverticulitis, plan for OR Monday 7/11.   Consulted for medical clearance.      Problem/Recommendation - 1:  ·  Problem: Diverticulitis. -  Pt presents with abdominal pain   - CT A/P: Focal mural thickening at the proximal sigmoid colon with adjacent stranding and diverticulosis, compatible with diverticulitis. No associated abscess is identified.   - Plan for surgery on Monday 7/11 with Dr. Olivo   - Clear liquid diet   - Plan for bowel prep before surgery, will be NPO after midnight on Sunday   - S/p Flagyl and 1L NS bolus   - Zofran PRN nausea   - Monitor fever and leukocytosis    - Plan per Surgery  - - cleared no active ischemia, decompensated heart failure, unstable arrythmia, or severe stenotic valvular disease. Patient is optimized from cardiovascular standpoint to proceed with planned procedure with routine hemodynamic monitoring.       Medical Clearance:  - RCRI: 1, Class II Risk; Pt is low risk for intermediate risk procedure, pt is optimized to proceed with planned procedure with routine hemodynamic monitoring.     Problem/Recommendation - 2:  ·  Problem: HLD (hyperlipidemia).   ·  Recommendation: Chronic  Continue statin.     Problem/Recommendation - 3:  ·  Problem: Need for prophylactic measure.   ·  Recommendation: DVT PPX as per Surgery: Heparin.     72 y/o female with PMHx of HLD & COPD presenting with abdominal pain. Admitted for diverticulitis, plan for OR Monday 7/11.   Consulted for medical clearance.      Problem/Recommendation - 1:  ·  Problem: Diverticulitis. -  Pt presents with abdominal pain   - CT A/P: Focal mural thickening at the proximal sigmoid colon with adjacent stranding and diverticulosis, compatible with diverticulitis. No associated abscess is identified.   - Plan for surgery on Monday 7/11 with Dr. Olivo   - Clear liquid diet   - Plan for bowel prep before surgery, will be NPO after midnight on Sunday   - S/p Flagyl and 1L NS bolus   - Zofran PRN nausea   - Monitor fever and leukocytosis    - Plan per Surgery , cardio dr Marroquin aware  cleared no active ischemia, decompensated heart failure, unstable arrythmia, or severe stenotic valvular disease. Patient is optimized from cardiovascular standpoint to proceed with planned procedure   Medical Clearance:  - RCRI: 1, Class II Risk; Pt is low risk for intermediate risk procedure, pt is optimized to proceed with planned procedure with routine hemodynamic monitoring.     Problem/Recommendation - 2:  ·  Problem: HLD (hyperlipidemia).   ·  Recommendation: Chronic  Continue statin.     Problem/Recommendation - 3:  ·  Problem: Need for prophylactic measure.   ·  Recommendation: DVT PPX as per Surgery: Heparin.

## 2022-07-09 NOTE — DISCHARGE NOTE PROVIDER - NSDCFUADDAPPT_GEN_ALL_CORE_FT
Make an appointment to see Dr. Olivo in one week. Make an appointment to see Dr. Olivo on Tues 7/26/22.

## 2022-07-09 NOTE — DISCHARGE NOTE PROVIDER - NSDCMRMEDTOKEN_GEN_ALL_CORE_FT
atorvastatin 40 mg oral tablet: 1 tab(s) orally once a day  Cipro 500 mg oral tablet: 1 tab(s) orally every 12 hours   metroNIDAZOLE 500 mg oral tablet: 1 tab(s) orally 3 times a day   traZODone 100 mg oral tablet: orally once a day (at bedtime)   atorvastatin 40 mg oral tablet: 1 tab(s) orally once a day  traZODone 100 mg oral tablet: orally once a day (at bedtime)

## 2022-07-09 NOTE — PROGRESS NOTE ADULT - SUBJECTIVE AND OBJECTIVE BOX
Pt seen and examined at bedside, states she had difficulty sleeping last night as she didn't receive her evening dose of Trazodone. Vital signs stable. Reports moderate "gas pain". Tolerating clear liquid diet. Denies any nausea/vomiting. Pt reports passing flatus, small BM last night (constipated). Pt OOB & ambulating.  Denies headache, chest pain, palpitations, shortness of breath, weakness or fatigue.    T(C): 36.4 (22 @ 05:32), Max: 36.9 (22 @ 09:18)  HR: 74 (22 @ 05:32) (69 - 81)  BP: 160/81 (22 @ 05:32) (116/67 - 160/81)  RR: 18 (22 @ 05:32) (16 - 18)  SpO2: 97% (22 @ 05:32) (95% - 99%)    PHYSICAL EXAM:  General: no acute distress, appears comfortable  Pulm: non labored respirations  Abdomen: soft, minimal tenderness to palpation, nondistended, (+) BS  Extremities: no calf tenderness noted    I&O's Detail    LABS:                        12.8   10.65 )-----------( 347      ( 2022 10:35 )             38.2         136  |  103  |  17  ----------------------------<  104<H>  4.4   |  25  |  0.64    Ca    9.0      2022 10:35    TPro  7.4  /  Alb  3.4  /  TBili  0.5  /  DBili  x   /  AST  33  /  ALT  25  /  AlkPhos  61  07-08    PT/INR - ( 2022 14:50 )   PT: 14.4 sec;   INR: 1.23 ratio    PTT - ( 2022 14:50 )  PTT:28.7 sec  Urinalysis Basic - ( 2022 11:45 )    Color: Pale Yellow / Appearance: Clear / S.005 / pH: x  Gluc: x / Ketone: Negative  / Bili: Negative / Urobili: Negative   Blood: x / Protein: Negative / Nitrite: Negative   Leuk Esterase: Trace / RBC: 0-2 /HPF / WBC 0-2   Sq Epi: x / Non Sq Epi: Occasional / Bacteria: x    RADIOLOGY & ADDITIONAL STUDIES:    MEDICATIONS:  acetaminophen   IVPB .. 1000 milliGRAM(s) IV Intermittent once  acetaminophen   IVPB .. 1000 milliGRAM(s) IV Intermittent once  acetaminophen   IVPB .. 1000 milliGRAM(s) IV Intermittent once  atorvastatin 40 milliGRAM(s) Oral at bedtime  heparin   Injectable 5000 Unit(s) SubCutaneous every 8 hours  HYDROmorphone   Tablet 0.5 milliGRAM(s) Oral every 4 hours PRN  ketorolac   Injectable 15 milliGRAM(s) IV Push every 6 hours PRN  melatonin 5 milliGRAM(s) Oral at bedtime  ondansetron Injectable 4 milliGRAM(s) IV Push every 6 hours PRN  piperacillin/tazobactam IVPB.. 3.375 Gram(s) IV Intermittent every 8 hours  polyethylene glycol/electrolyte Solution 2000 milliLiter(s) Oral once  sodium chloride 0.9%. 1000 milliLiter(s) IV Continuous <Continuous>

## 2022-07-09 NOTE — DISCHARGE NOTE PROVIDER - HOSPITAL COURSE
HPI: 70 y/o female with PMHx of HLD and COPD presents to the ED with worsening lower abdominal pain, she states her pain is intermittent (began in march), rates it a 7/10 at the moment. She also endorses constipation (reports a small BM this morning), fatigue, night chills, bloating, & nausea. Her last colonoscopy was two years ago. Pt states her outpt GI was unwilling to undergo a colonoscopy at this time. Denies chest pain, shortness of breath, vomiting, BRBPR, melena, urinary sx.  (08 Jul 2022 14:41)    Hospital Course:  CT abd/pelvis: "Focal mural thickening at the proximal sigmoid colon with adjacent stranding and diverticulosis, compatible with diverticulitis. No associated abscess is identified. Follow-up colonoscopy after treatment/resolution of acute disease may be pursued to rule out colon cancer."    Patient underwent successful ________, tolerated it well and was transferred to the PACU then to the floor for monitoring.   Once hemodynamically stable, pt was able to ambulate with assistance.  Medicine consulted for medical co-management/optimization. Cardio consulted for clearance.      Throughout hospital stay, patient was continued on antibiotics, pain was managed adequately.   Diet was advanced appropriately until return of normal GI function was obtained as evidence by passing of flatus and BM in addition to toleration of diet.  Lab values were monitored with resolution of elevated Wc, electrolytes were repleted as indicated.    Dispo: discharge home, to follow up with Dr. Olivo outpatient in 1 week.     HPI: 70 y/o female with PMHx of HLD and COPD presents to the ED with worsening lower abdominal pain, she states her pain is intermittent (began in march), rates it a 7/10 at the moment. She also endorses constipation (reports a small BM this morning), fatigue, night chills, bloating, & nausea. Her last colonoscopy was two years ago. Pt states her outpt GI was unwilling to undergo a colonoscopy at this time. Denies chest pain, shortness of breath, vomiting, BRBPR, melena, urinary sx.  (08 Jul 2022 14:41)    Hospital Course:  CT abd/pelvis: "Focal mural thickening at the proximal sigmoid colon with adjacent stranding and diverticulosis, compatible with diverticulitis. No associated abscess is identified. Follow-up colonoscopy after treatment/resolution of acute disease may be pursued to rule out colon cancer."    Medicine consulted for medical co-management/optimization. Cardio consulted for clearance. On Hospital Day #3, patient underwent successful laparoscopic sigmoid colectomy, tolerated it well and was transferred to the PACU then to the floor for monitoring.   Once hemodynamically stable, pt was able to ambulate with assistance.    Throughout hospital stay, patient was continued on antibiotics, pain was managed adequately. ERAS protocol was followed.  Diet was advanced appropriately until return of normal GI function was obtained as evidence by passing of flatus and BM in addition to toleration of diet.  Lab values were monitored with resolution of elevated Wc, electrolytes were repleted as indicated.    Dispo: discharge home, to follow up with Dr. Olivo outpatient in 1 week.     HPI: 70 y/o female with PMHx of HLD and COPD presents to the ED with worsening lower abdominal pain, she states her pain is intermittent (began in march), rates it a 7/10 at the moment. She also endorses constipation (reports a small BM this morning), fatigue, night chills, bloating, & nausea. Her last colonoscopy was two years ago. Pt states her outpt GI was unwilling to undergo a colonoscopy at this time. Denies chest pain, shortness of breath, vomiting, BRBPR, melena, urinary sx.  (08 Jul 2022 14:41)    Hospital Course:  CT abd/pelvis: "Focal mural thickening at the proximal sigmoid colon with adjacent stranding and diverticulosis, compatible with diverticulitis. No associated abscess is identified. Follow-up colonoscopy after treatment/resolution of acute disease may be pursued to rule out colon cancer."    Medicine consulted for medical co-management/optimization. Cardio consulted for clearance. On Hospital Day #3, patient underwent successful laparoscopic sigmoid colectomy, tolerated it well and was transferred to the PACU then to the floor for monitoring.   Once hemodynamically stable, pt was able to ambulate with assistance.    Throughout hospital stay, patient was continued on antibiotics, pain was managed adequately. ERAS protocol was followed.  Diet was advanced appropriately until return of normal GI function was obtained as evidence by passing of flatus and BM in addition to toleration of diet.  Lab values were monitored with resolution of elevated Wc, electrolytes were repleted as indicated.    Dispo: discharge home, to follow up with Dr. Olivo outpatient on Tues 7/26/22.

## 2022-07-09 NOTE — DISCHARGE NOTE PROVIDER - NSDCFUSCHEDAPPT_GEN_ALL_CORE_FT
Darryn Olivo  [facility]  GENSURG SHORT [address]  Scheduled Appointment: 07/11/2022    Marcella Garcia  [facility]  GASTRO [address]  Scheduled Appointment: 07/13/2022

## 2022-07-10 ENCOUNTER — TRANSCRIPTION ENCOUNTER (OUTPATIENT)
Age: 72
End: 2022-07-10

## 2022-07-10 LAB
ANION GAP SERPL CALC-SCNC: 10 MMOL/L — SIGNIFICANT CHANGE UP (ref 5–17)
APTT BLD: 32.9 SEC — SIGNIFICANT CHANGE UP (ref 27.5–35.5)
BUN SERPL-MCNC: 6 MG/DL — LOW (ref 7–23)
CALCIUM SERPL-MCNC: 8.8 MG/DL — SIGNIFICANT CHANGE UP (ref 8.5–10.1)
CHLORIDE SERPL-SCNC: 111 MMOL/L — HIGH (ref 96–108)
CO2 SERPL-SCNC: 20 MMOL/L — LOW (ref 22–31)
CREAT SERPL-MCNC: 0.53 MG/DL — SIGNIFICANT CHANGE UP (ref 0.5–1.3)
CULTURE RESULTS: SIGNIFICANT CHANGE UP
EGFR: 98 ML/MIN/1.73M2 — SIGNIFICANT CHANGE UP
GLUCOSE SERPL-MCNC: 98 MG/DL — SIGNIFICANT CHANGE UP (ref 70–99)
HCT VFR BLD CALC: 36.3 % — SIGNIFICANT CHANGE UP (ref 34.5–45)
HGB BLD-MCNC: 11.8 G/DL — SIGNIFICANT CHANGE UP (ref 11.5–15.5)
INR BLD: 1.19 RATIO — HIGH (ref 0.88–1.16)
MCHC RBC-ENTMCNC: 30 PG — SIGNIFICANT CHANGE UP (ref 27–34)
MCHC RBC-ENTMCNC: 32.5 GM/DL — SIGNIFICANT CHANGE UP (ref 32–36)
MCV RBC AUTO: 92.4 FL — SIGNIFICANT CHANGE UP (ref 80–100)
NRBC # BLD: 0 /100 WBCS — SIGNIFICANT CHANGE UP (ref 0–0)
PLATELET # BLD AUTO: 363 K/UL — SIGNIFICANT CHANGE UP (ref 150–400)
POTASSIUM SERPL-MCNC: 3.5 MMOL/L — SIGNIFICANT CHANGE UP (ref 3.5–5.3)
POTASSIUM SERPL-SCNC: 3.5 MMOL/L — SIGNIFICANT CHANGE UP (ref 3.5–5.3)
PROTHROM AB SERPL-ACNC: 14 SEC — HIGH (ref 10.5–13.4)
RBC # BLD: 3.93 M/UL — SIGNIFICANT CHANGE UP (ref 3.8–5.2)
RBC # FLD: 13.7 % — SIGNIFICANT CHANGE UP (ref 10.3–14.5)
SODIUM SERPL-SCNC: 141 MMOL/L — SIGNIFICANT CHANGE UP (ref 135–145)
SPECIMEN SOURCE: SIGNIFICANT CHANGE UP
WBC # BLD: 6.14 K/UL — SIGNIFICANT CHANGE UP (ref 3.8–10.5)
WBC # FLD AUTO: 6.14 K/UL — SIGNIFICANT CHANGE UP (ref 3.8–10.5)

## 2022-07-10 PROCEDURE — 99232 SBSQ HOSP IP/OBS MODERATE 35: CPT

## 2022-07-10 PROCEDURE — 99233 SBSQ HOSP IP/OBS HIGH 50: CPT | Mod: GC

## 2022-07-10 RX ORDER — GABAPENTIN 400 MG/1
600 CAPSULE ORAL ONCE
Refills: 0 | Status: COMPLETED | OUTPATIENT
Start: 2022-07-11 | End: 2022-07-11

## 2022-07-10 RX ORDER — ACETAMINOPHEN 500 MG
1000 TABLET ORAL ONCE
Refills: 0 | Status: COMPLETED | OUTPATIENT
Start: 2022-07-11 | End: 2022-07-11

## 2022-07-10 RX ADMIN — Medication 1000 MILLIGRAM(S): at 18:38

## 2022-07-10 RX ADMIN — HEPARIN SODIUM 5000 UNIT(S): 5000 INJECTION INTRAVENOUS; SUBCUTANEOUS at 21:54

## 2022-07-10 RX ADMIN — ATORVASTATIN CALCIUM 40 MILLIGRAM(S): 80 TABLET, FILM COATED ORAL at 21:54

## 2022-07-10 RX ADMIN — HEPARIN SODIUM 5000 UNIT(S): 5000 INJECTION INTRAVENOUS; SUBCUTANEOUS at 13:51

## 2022-07-10 RX ADMIN — PIPERACILLIN AND TAZOBACTAM 25 GRAM(S): 4; .5 INJECTION, POWDER, LYOPHILIZED, FOR SOLUTION INTRAVENOUS at 07:02

## 2022-07-10 RX ADMIN — HEPARIN SODIUM 5000 UNIT(S): 5000 INJECTION INTRAVENOUS; SUBCUTANEOUS at 07:02

## 2022-07-10 RX ADMIN — PIPERACILLIN AND TAZOBACTAM 25 GRAM(S): 4; .5 INJECTION, POWDER, LYOPHILIZED, FOR SOLUTION INTRAVENOUS at 23:24

## 2022-07-10 RX ADMIN — Medication 100 MILLIGRAM(S): at 21:53

## 2022-07-10 RX ADMIN — PIPERACILLIN AND TAZOBACTAM 25 GRAM(S): 4; .5 INJECTION, POWDER, LYOPHILIZED, FOR SOLUTION INTRAVENOUS at 14:29

## 2022-07-10 RX ADMIN — Medication 400 MILLIGRAM(S): at 18:18

## 2022-07-10 NOTE — PROGRESS NOTE ADULT - SUBJECTIVE AND OBJECTIVE BOX
[facility] -- Wu Moreno Grossman, [place], Ameya Garay Savella, Goodger: Office # [phone]    Follow Up:  Cardiac clearance, HLD    Subjective/Observations: Patient awake, alert, resting in bed. Denies chest pain, palpitations and dizziness. Denies any difficulty breathing. No orthopnea and PND. Tolerating room air. IVF @ 70    REVIEW OF SYSTEMS: All other review of systems are negative unless indicated above    PAST MEDICAL & SURGICAL HISTORY:  Sigmoid diverticulitis    HLD (hyperlipidemia)    COPD (chronic obstructive pulmonary disease)      MEDICATIONS  (STANDING):  acetaminophen   IVPB .. 1000 milliGRAM(s) IV Intermittent once  acetaminophen   IVPB .. 1000 milliGRAM(s) IV Intermittent once  acetaminophen   IVPB .. 1000 milliGRAM(s) IV Intermittent once  atorvastatin 40 milliGRAM(s) Oral at bedtime  heparin   Injectable 5000 Unit(s) SubCutaneous every 8 hours  melatonin 5 milliGRAM(s) Oral at bedtime  piperacillin/tazobactam IVPB.. 3.375 Gram(s) IV Intermittent every 8 hours  sodium chloride 0.9%. 1000 milliLiter(s) (70 mL/Hr) IV Continuous <Continuous>  traZODone 100 milliGRAM(s) Oral at bedtime    MEDICATIONS  (PRN):  HYDROmorphone   Tablet 0.5 milliGRAM(s) Oral every 4 hours PRN Severe Pain (7 - 10)  ketorolac   Injectable 15 milliGRAM(s) IV Push every 6 hours PRN Moderate Pain (4 - 6)  ondansetron Injectable 4 milliGRAM(s) IV Push every 6 hours PRN Nausea    Allergies  No Known Allergies      Vital Signs Last 24 Hrs  T(C): 36.7 (10 Jul 2022 05:01), Max: 36.7 (09 Jul 2022 20:04)  T(F): 98.1 (10 Jul 2022 05:01), Max: 98.1 (09 Jul 2022 20:04)  HR: 94 (10 Jul 2022 05:01) (81 - 94)  BP: 134/72 (10 Jul 2022 05:01) (134/72 - 154/81)  BP(mean): --  RR: 18 (10 Jul 2022 05:01) (18 - 18)  SpO2: 94% (10 Jul 2022 05:01) (94% - 96%)    Parameters below as of 10 Jul 2022 05:01  Patient On (Oxygen Delivery Method): room air      I&O's Summary    09 Jul 2022 07:01  -  10 Jul 2022 07:00  --------------------------------------------------------  IN: 1350 mL / OUT: 0 mL / NET: 1350 mL         TELE: Not on telemetry   PHYSICAL EXAM:  Constitutional: NAD, awake and alert, Well developed   HEENT: Moist Mucous Membranes, Anicteric  Pulmonary: Non-labored, breath sounds are clear bilaterally, No wheezing, rales or rhonchi  Cardiovascular: Regular, S1 and S2, No murmurs, No rubs, gallops or clicks  Gastrointestinal:  soft, nontender, nondistended   Lymph: No peripheral edema. No lymphadenopathy.   Skin: No visible rashes or ulcers.  Psych:  Mood & affect appropriate      LABS: All Labs Reviewed:                        11.8   6.14  )-----------( 363      ( 10 Jul 2022 06:05 )             36.3                         12.4   6.89  )-----------( 350      ( 09 Jul 2022 08:33 )             38.0                         12.8   10.65 )-----------( 347      ( 08 Jul 2022 10:35 )             38.2     10 Jul 2022 06:05    141    |  111    |  6      ----------------------------<  98     3.5     |  20     |  0.53   09 Jul 2022 08:33    140    |  107    |  8      ----------------------------<  158    3.6     |  24     |  0.51   08 Jul 2022 10:35    136    |  103    |  17     ----------------------------<  104    4.4     |  25     |  0.64     Ca    8.8        10 Jul 2022 06:05  Ca    9.0        09 Jul 2022 08:33  Ca    9.0        08 Jul 2022 10:35    TPro  7.4    /  Alb  3.4    /  TBili  0.5    /  DBili  x      /  AST  33     /  ALT  25     /  AlkPhos  61     08 Jul 2022 10:35     PT/INR - ( 10 Jul 2022 06:05 )   PT: 14.0 sec;   INR: 1.19 ratio         PTT - ( 10 Jul 2022 06:05 )  PTT:32.9 sec  12 Lead ECG:   Ventricular Rate 75 BPM    Atrial Rate 75 BPM    P-R Interval 136 ms    QRS Duration 92 ms    Q-T Interval 394 ms    QTC Calculation(Bazett) 439 ms    P Axis 67 degrees    R Axis 43 degrees    T Axis 65 degrees    Diagnosis Line Normal sinus rhythm  Normal ECG  No previous ECGs available  Confirmed by Jaquelin Marroquin ([id number]) on 7/9/2022 11:19:08 AM (07-08-22 @ 14:46)

## 2022-07-10 NOTE — PROGRESS NOTE ADULT - NS ATTEND AMEND GEN_ALL_CORE FT
Chart reviewed    Patient seen and examined    Agree with plan as outlined above    70 y/o female with PMHx of HLD & COPD presenting with abdominal pain, found to have active diverticulitis on CT. Cardiology consulted for preop in anticipation of lap sigmoid colectomy on 7/11/22.    Preop, HLD  - Denies chest pain, palpitation, SOB orthopnea, PND, HUGHES and edema  - EKG demonstrates NSR. No acute ischemic changes noted.   - Patient euvolemic on examination with no overt signs of heart failure or cardiac ischemia.   - No severe valvular abnormalities noted on examination  - No evidence of significant arrhythmia   - Would check routine ECHO to r/o valvular abnormalities and to assess for pulmonary hypertension and heart function.   - Pt has no active ischemia, decompensated heart failure, unstable arrythmia, or severe stenotic valvular disease. Patient is optimized from cardiovascular standpoint to proceed with planned procedure with routine hemodynamic monitoring.   - Continue statin

## 2022-07-10 NOTE — PROGRESS NOTE ADULT - ASSESSMENT
70 y/o female with PMHx of HLD & COPD presenting with abdominal pain. Admitted for diverticulitis, plan for OR Monday 7/11.   Consulted for medical clearance.      Problem/Recommendation - 1:  ·  Problem: Diverticulitis. -  Pt presents with abdominal pain   - CT A/P: Focal mural thickening at the proximal sigmoid colon with adjacent stranding and diverticulosis, compatible with diverticulitis. No associated abscess is identified.   - Plan for surgery on Monday 7/11 with Dr. Olivo   - Clear liquid diet   - Plan for bowel prep before surgery, will be NPO after midnight by surgery team   - S/p Flagyl and 1L NS bolus   - Zofran PRN nausea   - Monitor fever and leukocytosis  - no fever , wbc wnl 7/9/22  - Plan per Surgery , cardio dr Marroquin aware  cleared - no active ischemia, decompensated heart failure, unstable arrythmia, or severe stenotic valvular disease. Patient is optimized from cardiovascular standpoint to proceed with planned procedure   Medical Clearance:  - RCRI: 1, Class II Risk; Pt is low risk for intermediate risk procedure, pt is optimized to proceed with planned procedure with routine hemodynamic monitoring.     Problem/Recommendation - 2:  ·  Problem: HLD (hyperlipidemia).   ·  Recommendation: Chronic  Continue statin.      problem 3  -  hx  mood dis / anxiety - on Trazadone 100 mg po daily    Problem/Recommendation - 4  ·  Problem: Need for prophylactic measure.   ·  Recommendation: DVT PPX as per Surgery: Heparin.

## 2022-07-10 NOTE — PROGRESS NOTE ADULT - ASSESSMENT
70 y/o female with PMHx of HLD & COPD presenting with abdominal pain, found to have active diverticulitis on CT.    PLAN:  - Plan for OR Monday 7/11 with Dr. Olivo; medical & cardiac clearance on chart  - Continue clear liquid diet during the day & IVF - pt to be NPO after midnight  - Continue Zosyn  - ERAS protocol  - Continue pain control as needed  - Monitor bowel function/serial abdominal exams  - Tap water enema tonight  - Daily labs  - DVT Prophylaxis with subq heparin & SCDs  - Discussed with Dr. Vargas (covering for Dr. Olivo) 72 y/o female with PMHx of HLD & COPD presenting with abdominal pain, found to have active diverticulitis on CT.    PLAN:  - Plan for OR Monday 7/11 with Dr. Olivo; medical & cardiac clearance on chart  - Continue clear liquid diet during the day & IVF - pt to be NPO after midnight  - Continue Zosyn  - ERAS protocol  - Continue pain control as needed  - Monitor bowel function/serial abdominal exams  - Tap water enema tonight  - Daily labs  - DVT Prophylaxis with subq heparin & SCDs  - Discussed with Dr. Vargas (covering for Dr. Olivo)    Surgically stable, tolerating diet, + BM, OR tomorrow.  70 y/o female with PMHx of HLD & COPD presenting with abdominal pain, found to have active diverticulitis on CT.    PLAN:  - Plan for OR Monday 7/11 with Dr. Olivo; medical & cardiac clearance on chart  - Continue clear liquid diet during the day & IVF - pt to be NPO after midnight  - Continue Zosyn  - ERAS protocol  - Continue pain control as needed  - Monitor bowel function/serial abdominal exams  - Tap water enema tonight  - Daily labs  - DVT Prophylaxis with subq heparin & SCDs  - Discussed with Dr. Vargas (covering for Dr. Olivo)    Surgically stable, bowel prep in progress,  OR tomorrow.

## 2022-07-10 NOTE — PROGRESS NOTE ADULT - SUBJECTIVE AND OBJECTIVE BOX
Pt seen and examined at bedside, denies any overnight events. Vital signs stable. Denies pain at rest. She received bowel prep yesterday evening - continuing to have bowel movements this morning. Plan for a tap water enema tonight Tolerating clear liquid diet. Denies any nausea/vomiting. Pt OOB & ambulating.  Denies headache, chest pain, palpitations, shortness of breath, weakness or fatigue.    T(C): 36.7 (07-10-22 @ 05:01), Max: 36.9 (22 @ 13:20)  HR: 94 (07-10-22 @ 05:01) (81 - 94)  BP: 134/72 (07-10-22 @ 05:01) (134/72 - 158/72)  RR: 18 (07-10-22 @ 05:01) (18 - 18)  SpO2: 94% (07-10-22 @ 05:01) (94% - 96%)      PHYSICAL EXAM:  General: no acute distress, appears comfortable  Pulm: non labored respirations  Abdomen: soft, suprapubic tenderness to palpation, nondistended, (+) BS  Extremities: no calf tenderness noted    I&O's Detail      LABS:                        12.4   6.89  )-----------( 350      ( 2022 08:33 )             38.0     07-    140  |  107  |  8   ----------------------------<  158<H>  3.6   |  24  |  0.51    Ca    9.0      2022 08:33    TPro  7.4  /  Alb  3.4  /  TBili  0.5  /  DBili  x   /  AST  33  /  ALT  25  /  AlkPhos  61  07-08    PT/INR - ( 2022 14:50 )   PT: 14.4 sec;   INR: 1.23 ratio    PTT - ( 2022 14:50 )  PTT:28.7 sec  Urinalysis Basic - ( 2022 11:45 )    Color: Pale Yellow / Appearance: Clear / S.005 / pH: x  Gluc: x / Ketone: Negative  / Bili: Negative / Urobili: Negative   Blood: x / Protein: Negative / Nitrite: Negative   Leuk Esterase: Trace / RBC: 0-2 /HPF / WBC 0-2   Sq Epi: x / Non Sq Epi: Occasional / Bacteria: x      MEDICATIONS:  acetaminophen   IVPB .. 1000 milliGRAM(s) IV Intermittent once  acetaminophen   IVPB .. 1000 milliGRAM(s) IV Intermittent once  acetaminophen   IVPB .. 1000 milliGRAM(s) IV Intermittent once  acetaminophen   IVPB .. 1000 milliGRAM(s) IV Intermittent once  atorvastatin 40 milliGRAM(s) Oral at bedtime  heparin   Injectable 5000 Unit(s) SubCutaneous every 8 hours  HYDROmorphone   Tablet 0.5 milliGRAM(s) Oral every 4 hours PRN  ketorolac   Injectable 15 milliGRAM(s) IV Push every 6 hours PRN  melatonin 5 milliGRAM(s) Oral at bedtime  ondansetron Injectable 4 milliGRAM(s) IV Push every 6 hours PRN  piperacillin/tazobactam IVPB.. 3.375 Gram(s) IV Intermittent every 8 hours  sodium chloride 0.9%. 1000 milliLiter(s) IV Continuous <Continuous>  traZODone 100 milliGRAM(s) Oral at bedtime

## 2022-07-10 NOTE — PROGRESS NOTE ADULT - ASSESSMENT
72 y/o female with PMHx of HLD & COPD presenting with abdominal pain, found to have active diverticulitis on CT. Cardiology consulted for preop in anticipation of lap sigmoid colectomy on 7/11/22.    Preop, HLD  - Denies chest pain, palpitation, SOB orthopnea, PND, HUGHES and edema  - EKG demonstrates NSR. No acute ischemic changes noted.   - Patient euvolemic on examination with no overt signs of heart failure or cardiac ischemia.   - No severe valvular abnormalities noted on examination  - No evidence of significant arrhythmia   - Would check routine ECHO to r/o valvular abnormalities and to assess for pulmonary hypertension and heart function.   - Pt has no active ischemia, decompensated heart failure, unstable arrythmia, or severe stenotic valvular disease. Patient is optimized from cardiovascular standpoint to proceed with planned procedure with routine hemodynamic monitoring.   - Continue statin    - Monitor and replete lytes, keep K>4, Mg>2.  - Will continue to follow.    Laisha Olvera, MS FNP, [facility]  Nurse Practitioner- Cardiology   Spectra #[phone] /[phone]

## 2022-07-10 NOTE — PROGRESS NOTE ADULT - SUBJECTIVE AND OBJECTIVE BOX
medical fu up consult for surgery   Patient is a 71y old  Female who presents with a chief complaint of diverticulitis (10 Jul 2022 07:23)  pt seen and examine today awake  , tolerating  clear  liquid diet , no acute abd pain , no fever.     INTERVAL HPI/OVERNIGHT EVENTS:     T(C): 36.7 (07-10-22 @ 05:01), Max: 36.9 (22 @ 13:20)  HR: 94 (07-10-22 @ 05:01) (81 - 94)  BP: 134/72 (07-10-22 @ 05:01) (134/72 - 158/72)  RR: 18 (07-10-22 @ 05:01) (18 - 18)  SpO2: 94% (07-10-22 @ 05:01) (94% - 96%)  Wt(kg): --  I&O's Summary      REVIEW OF SYSTEMS:  CONSTITUTIONAL: No fever, weight loss, or fatigue  EYES: No eye pain, visual disturbances, or discharge  ENMT:  No difficulty hearing, tinnitus, vertigo;  NECK: No pain or stiffness  RESPIRATORY: No cough, wheezing, chills or hemoptysis; No shortness of breath  CARDIOVASCULAR: No chest pain, palpitations, dizziness, or leg swelling  GASTROINTESTINAL: No abdominal or epigastric pain. No nausea, vomiting,  No diarrhea or constipation. No melena or hematochezia.  GENITOURINARY: No dysuria, frequency, hematuria, or incontinence  NEUROLOGICAL: No headaches, memory loss, loss of strength, numbness, or tremors  SKIN: No itching, burning, rashes, or lesions   MUSCULOSKELETAL: No joint pain or swelling; No muscle, back, or extremity pain    PHYSICAL EXAM:  GENERAL: NAD, well-groomed, well-developed  HEAD:  Atraumatic, Normocephalic  EYES: EOMI, PERRLA, conjunctiva and sclera clear  ENMT:Moist mucous membranes  NECK: Supple, No JVD  NERVOUS SYSTEM:  Alert & Oriented X3; Motor Strength 5/5 B/L upper and lower extremities; DTRs 2+ intact and symmetric  CHEST/LUNG:  percussion bilaterally; No rales, rhonchi, wheezing,   HEART: Regular rate and rhythm; No murmurs, no tachy   ABDOMEN: Soft, MILD tender, Nondistended; Bowel sounds present  EXTREMITIES:  2+ Peripheral Pulses, No clubbing, cyanosis, or edema  SKIN: No rashes or lesions    MEDICATIONS  (STANDING):  acetaminophen   IVPB .. 1000 milliGRAM(s) IV Intermittent once  acetaminophen   IVPB .. 1000 milliGRAM(s) IV Intermittent once  acetaminophen   IVPB .. 1000 milliGRAM(s) IV Intermittent once  acetaminophen   IVPB .. 1000 milliGRAM(s) IV Intermittent once  atorvastatin 40 milliGRAM(s) Oral at bedtime  heparin   Injectable 5000 Unit(s) SubCutaneous every 8 hours  melatonin 5 milliGRAM(s) Oral at bedtime  piperacillin/tazobactam IVPB.. 3.375 Gram(s) IV Intermittent every 8 hours  sodium chloride 0.9%. 1000 milliLiter(s) (70 mL/Hr) IV Continuous <Continuous>  traZODone 100 milliGRAM(s) Oral at bedtime    MEDICATIONS  (PRN):  HYDROmorphone   Tablet 0.5 milliGRAM(s) Oral every 4 hours PRN Severe Pain (7 - 10)  ketorolac   Injectable 15 milliGRAM(s) IV Push every 6 hours PRN Moderate Pain (4 - 6)  ondansetron Injectable 4 milliGRAM(s) IV Push every 6 hours PRN Nausea      LABS:                        12.4   6.89  )-----------( 350      ( 2022 08:33 )             38.0     07-09    140  |  107  |  8   ----------------------------<  158<H>  3.6   |  24  |  0.51    Ca    9.0      2022 08:33    TPro  7.4  /  Alb  3.4  /  TBili  0.5  /  DBili  x   /  AST  33  /  ALT  25  /  AlkPhos  61  07-08    PT/INR - ( 2022 14:50 )   PT: 14.4 sec;   INR: 1.23 ratio         PTT - ( 2022 14:50 )  PTT:28.7 sec  Urinalysis Basic - ( 2022 11:45 )    Color: Pale Yellow / Appearance: Clear / S.005 / pH: x  Gluc: x / Ketone: Negative  / Bili: Negative / Urobili: Negative   Blood: x / Protein: Negative / Nitrite: Negative   Leuk Esterase: Trace / RBC: 0-2 /HPF / WBC 0-2   Sq Epi: x / Non Sq Epi: Occasional / Bacteria: x            07-08 @ 11:45   <10,000 CFU/mL Normal Urogenital Inocencia  --  --          RADIOLOGY & ADDITIONAL TESTS:    Imaging Personally Reviewed:   no new test     Advance Directives:  full code

## 2022-07-11 ENCOUNTER — APPOINTMENT (OUTPATIENT)
Dept: SURGERY | Facility: HOSPITAL | Age: 72
End: 2022-07-11

## 2022-07-11 ENCOUNTER — RESULT REVIEW (OUTPATIENT)
Age: 72
End: 2022-07-11

## 2022-07-11 ENCOUNTER — TRANSCRIPTION ENCOUNTER (OUTPATIENT)
Age: 72
End: 2022-07-11

## 2022-07-11 LAB
ALBUMIN SERPL ELPH-MCNC: 3.2 G/DL — LOW (ref 3.3–5)
ALP SERPL-CCNC: 54 U/L — SIGNIFICANT CHANGE UP (ref 40–120)
ALT FLD-CCNC: 19 U/L — SIGNIFICANT CHANGE UP (ref 12–78)
ANION GAP SERPL CALC-SCNC: 7 MMOL/L — SIGNIFICANT CHANGE UP (ref 5–17)
AST SERPL-CCNC: 15 U/L — SIGNIFICANT CHANGE UP (ref 15–37)
BASOPHILS # BLD AUTO: 0.05 K/UL — SIGNIFICANT CHANGE UP (ref 0–0.2)
BASOPHILS NFR BLD AUTO: 0.9 % — SIGNIFICANT CHANGE UP (ref 0–2)
BILIRUB SERPL-MCNC: 0.4 MG/DL — SIGNIFICANT CHANGE UP (ref 0.2–1.2)
BUN SERPL-MCNC: 4 MG/DL — LOW (ref 7–23)
CALCIUM SERPL-MCNC: 9.1 MG/DL — SIGNIFICANT CHANGE UP (ref 8.5–10.1)
CHLORIDE SERPL-SCNC: 113 MMOL/L — HIGH (ref 96–108)
CO2 SERPL-SCNC: 23 MMOL/L — SIGNIFICANT CHANGE UP (ref 22–31)
CREAT SERPL-MCNC: 0.5 MG/DL — SIGNIFICANT CHANGE UP (ref 0.5–1.3)
EGFR: 100 ML/MIN/1.73M2 — SIGNIFICANT CHANGE UP
EOSINOPHIL # BLD AUTO: 0.1 K/UL — SIGNIFICANT CHANGE UP (ref 0–0.5)
EOSINOPHIL NFR BLD AUTO: 1.9 % — SIGNIFICANT CHANGE UP (ref 0–6)
GLUCOSE SERPL-MCNC: 107 MG/DL — HIGH (ref 70–99)
HCT VFR BLD CALC: 36.6 % — SIGNIFICANT CHANGE UP (ref 34.5–45)
HGB BLD-MCNC: 12.3 G/DL — SIGNIFICANT CHANGE UP (ref 11.5–15.5)
IMM GRANULOCYTES NFR BLD AUTO: 0.4 % — SIGNIFICANT CHANGE UP (ref 0–1.5)
LYMPHOCYTES # BLD AUTO: 0.86 K/UL — LOW (ref 1–3.3)
LYMPHOCYTES # BLD AUTO: 16.2 % — SIGNIFICANT CHANGE UP (ref 13–44)
MCHC RBC-ENTMCNC: 30.4 PG — SIGNIFICANT CHANGE UP (ref 27–34)
MCHC RBC-ENTMCNC: 33.6 GM/DL — SIGNIFICANT CHANGE UP (ref 32–36)
MCV RBC AUTO: 90.6 FL — SIGNIFICANT CHANGE UP (ref 80–100)
MONOCYTES # BLD AUTO: 0.31 K/UL — SIGNIFICANT CHANGE UP (ref 0–0.9)
MONOCYTES NFR BLD AUTO: 5.8 % — SIGNIFICANT CHANGE UP (ref 2–14)
NEUTROPHILS # BLD AUTO: 3.96 K/UL — SIGNIFICANT CHANGE UP (ref 1.8–7.4)
NEUTROPHILS NFR BLD AUTO: 74.8 % — SIGNIFICANT CHANGE UP (ref 43–77)
NRBC # BLD: 0 /100 WBCS — SIGNIFICANT CHANGE UP (ref 0–0)
PLATELET # BLD AUTO: 378 K/UL — SIGNIFICANT CHANGE UP (ref 150–400)
POTASSIUM SERPL-MCNC: 3.2 MMOL/L — LOW (ref 3.5–5.3)
POTASSIUM SERPL-SCNC: 3.2 MMOL/L — LOW (ref 3.5–5.3)
PROT SERPL-MCNC: 6.7 G/DL — SIGNIFICANT CHANGE UP (ref 6–8.3)
RBC # BLD: 4.04 M/UL — SIGNIFICANT CHANGE UP (ref 3.8–5.2)
RBC # FLD: 13.4 % — SIGNIFICANT CHANGE UP (ref 10.3–14.5)
SODIUM SERPL-SCNC: 143 MMOL/L — SIGNIFICANT CHANGE UP (ref 135–145)
WBC # BLD: 5.3 K/UL — SIGNIFICANT CHANGE UP (ref 3.8–10.5)
WBC # FLD AUTO: 5.3 K/UL — SIGNIFICANT CHANGE UP (ref 3.8–10.5)

## 2022-07-11 PROCEDURE — 44213 LAP MOBIL SPLENIC FL ADD-ON: CPT

## 2022-07-11 PROCEDURE — 44204 LAPARO PARTIAL COLECTOMY: CPT

## 2022-07-11 PROCEDURE — 99232 SBSQ HOSP IP/OBS MODERATE 35: CPT

## 2022-07-11 PROCEDURE — 88307 TISSUE EXAM BY PATHOLOGIST: CPT | Mod: 26

## 2022-07-11 PROCEDURE — 44213 LAP MOBIL SPLENIC FL ADD-ON: CPT | Mod: AS

## 2022-07-11 PROCEDURE — 88304 TISSUE EXAM BY PATHOLOGIST: CPT | Mod: 26

## 2022-07-11 PROCEDURE — 99233 SBSQ HOSP IP/OBS HIGH 50: CPT

## 2022-07-11 PROCEDURE — 44204 LAPARO PARTIAL COLECTOMY: CPT | Mod: AS

## 2022-07-11 DEVICE — STAPLER COVIDIEN EEA CIRCULAR DST 28MM 3.5MM BLUE: Type: IMPLANTABLE DEVICE | Status: FUNCTIONAL

## 2022-07-11 DEVICE — LIGATING CLIPS WECK HEMOLOK POLYMER MEDIUM-LARGE (GREEN) 6: Type: IMPLANTABLE DEVICE | Status: FUNCTIONAL

## 2022-07-11 DEVICE — STAPLER COVIDIEN TRI-STAPLE 60MM PURPLE RELOAD: Type: IMPLANTABLE DEVICE | Status: FUNCTIONAL

## 2022-07-11 RX ORDER — GABAPENTIN 400 MG/1
300 CAPSULE ORAL ONCE
Refills: 0 | Status: COMPLETED | OUTPATIENT
Start: 2022-07-11 | End: 2022-07-11

## 2022-07-11 RX ORDER — HYDRALAZINE HCL 50 MG
5 TABLET ORAL ONCE
Refills: 0 | Status: COMPLETED | OUTPATIENT
Start: 2022-07-11 | End: 2022-07-11

## 2022-07-11 RX ORDER — ONDANSETRON 8 MG/1
4 TABLET, FILM COATED ORAL EVERY 6 HOURS
Refills: 0 | Status: DISCONTINUED | OUTPATIENT
Start: 2022-07-11 | End: 2022-07-15

## 2022-07-11 RX ORDER — KETOROLAC TROMETHAMINE 30 MG/ML
15 SYRINGE (ML) INJECTION EVERY 6 HOURS
Refills: 0 | Status: DISCONTINUED | OUTPATIENT
Start: 2022-07-11 | End: 2022-07-12

## 2022-07-11 RX ORDER — ACETAMINOPHEN 500 MG
1000 TABLET ORAL EVERY 6 HOURS
Refills: 0 | Status: DISCONTINUED | OUTPATIENT
Start: 2022-07-11 | End: 2022-07-11

## 2022-07-11 RX ORDER — ONDANSETRON 8 MG/1
4 TABLET, FILM COATED ORAL ONCE
Refills: 0 | Status: DISCONTINUED | OUTPATIENT
Start: 2022-07-11 | End: 2022-07-11

## 2022-07-11 RX ORDER — HYDROMORPHONE HYDROCHLORIDE 2 MG/ML
0.5 INJECTION INTRAMUSCULAR; INTRAVENOUS; SUBCUTANEOUS
Refills: 0 | Status: DISCONTINUED | OUTPATIENT
Start: 2022-07-11 | End: 2022-07-11

## 2022-07-11 RX ORDER — ATORVASTATIN CALCIUM 80 MG/1
40 TABLET, FILM COATED ORAL AT BEDTIME
Refills: 0 | Status: DISCONTINUED | OUTPATIENT
Start: 2022-07-11 | End: 2022-07-15

## 2022-07-11 RX ORDER — HYDROMORPHONE HYDROCHLORIDE 2 MG/ML
0.25 INJECTION INTRAMUSCULAR; INTRAVENOUS; SUBCUTANEOUS
Refills: 0 | Status: DISCONTINUED | OUTPATIENT
Start: 2022-07-11 | End: 2022-07-11

## 2022-07-11 RX ORDER — POTASSIUM CHLORIDE 20 MEQ
40 PACKET (EA) ORAL ONCE
Refills: 0 | Status: COMPLETED | OUTPATIENT
Start: 2022-07-11 | End: 2022-07-11

## 2022-07-11 RX ORDER — DEXTROSE MONOHYDRATE, SODIUM CHLORIDE, AND POTASSIUM CHLORIDE 50; .745; 4.5 G/1000ML; G/1000ML; G/1000ML
1000 INJECTION, SOLUTION INTRAVENOUS
Refills: 0 | Status: DISCONTINUED | OUTPATIENT
Start: 2022-07-11 | End: 2022-07-11

## 2022-07-11 RX ORDER — ACETAMINOPHEN 500 MG
1000 TABLET ORAL ONCE
Refills: 0 | Status: COMPLETED | OUTPATIENT
Start: 2022-07-11 | End: 2022-07-11

## 2022-07-11 RX ORDER — HEPARIN SODIUM 5000 [USP'U]/ML
5000 INJECTION INTRAVENOUS; SUBCUTANEOUS EVERY 8 HOURS
Refills: 0 | Status: DISCONTINUED | OUTPATIENT
Start: 2022-07-11 | End: 2022-07-15

## 2022-07-11 RX ORDER — ALPRAZOLAM 0.25 MG
0.25 TABLET ORAL ONCE
Refills: 0 | Status: DISCONTINUED | OUTPATIENT
Start: 2022-07-11 | End: 2022-07-11

## 2022-07-11 RX ORDER — OXYCODONE HYDROCHLORIDE 5 MG/1
5 TABLET ORAL EVERY 4 HOURS
Refills: 0 | Status: DISCONTINUED | OUTPATIENT
Start: 2022-07-11 | End: 2022-07-14

## 2022-07-11 RX ORDER — ACETAMINOPHEN 500 MG
1000 TABLET ORAL EVERY 6 HOURS
Refills: 0 | Status: COMPLETED | OUTPATIENT
Start: 2022-07-11 | End: 2022-07-12

## 2022-07-11 RX ORDER — HYDROMORPHONE HYDROCHLORIDE 2 MG/ML
0.2 INJECTION INTRAMUSCULAR; INTRAVENOUS; SUBCUTANEOUS
Refills: 0 | Status: DISCONTINUED | OUTPATIENT
Start: 2022-07-11 | End: 2022-07-14

## 2022-07-11 RX ORDER — SODIUM CHLORIDE 9 MG/ML
1000 INJECTION, SOLUTION INTRAVENOUS
Refills: 0 | Status: DISCONTINUED | OUTPATIENT
Start: 2022-07-11 | End: 2022-07-11

## 2022-07-11 RX ORDER — TRAZODONE HCL 50 MG
100 TABLET ORAL AT BEDTIME
Refills: 0 | Status: DISCONTINUED | OUTPATIENT
Start: 2022-07-11 | End: 2022-07-15

## 2022-07-11 RX ORDER — PIPERACILLIN AND TAZOBACTAM 4; .5 G/20ML; G/20ML
3.38 INJECTION, POWDER, LYOPHILIZED, FOR SOLUTION INTRAVENOUS EVERY 8 HOURS
Refills: 0 | Status: DISCONTINUED | OUTPATIENT
Start: 2022-07-11 | End: 2022-07-15

## 2022-07-11 RX ADMIN — Medication 100 MILLIGRAM(S): at 21:18

## 2022-07-11 RX ADMIN — Medication 5 MILLIGRAM(S): at 09:02

## 2022-07-11 RX ADMIN — Medication 15 MILLIGRAM(S): at 20:18

## 2022-07-11 RX ADMIN — Medication 400 MILLIGRAM(S): at 18:03

## 2022-07-11 RX ADMIN — Medication 15 MILLIGRAM(S): at 20:01

## 2022-07-11 RX ADMIN — PIPERACILLIN AND TAZOBACTAM 25 GRAM(S): 4; .5 INJECTION, POWDER, LYOPHILIZED, FOR SOLUTION INTRAVENOUS at 07:11

## 2022-07-11 RX ADMIN — PIPERACILLIN AND TAZOBACTAM 25 GRAM(S): 4; .5 INJECTION, POWDER, LYOPHILIZED, FOR SOLUTION INTRAVENOUS at 20:01

## 2022-07-11 RX ADMIN — GABAPENTIN 300 MILLIGRAM(S): 400 CAPSULE ORAL at 21:17

## 2022-07-11 RX ADMIN — Medication 400 MILLIGRAM(S): at 23:03

## 2022-07-11 RX ADMIN — Medication 40 MILLIEQUIVALENT(S): at 09:43

## 2022-07-11 RX ADMIN — Medication 1000 MILLIGRAM(S): at 23:33

## 2022-07-11 RX ADMIN — DEXTROSE MONOHYDRATE, SODIUM CHLORIDE, AND POTASSIUM CHLORIDE 70 MILLILITER(S): 50; .745; 4.5 INJECTION, SOLUTION INTRAVENOUS at 11:10

## 2022-07-11 RX ADMIN — Medication 1000 MILLIGRAM(S): at 12:40

## 2022-07-11 RX ADMIN — HEPARIN SODIUM 5000 UNIT(S): 5000 INJECTION INTRAVENOUS; SUBCUTANEOUS at 07:11

## 2022-07-11 RX ADMIN — HEPARIN SODIUM 5000 UNIT(S): 5000 INJECTION INTRAVENOUS; SUBCUTANEOUS at 21:17

## 2022-07-11 RX ADMIN — GABAPENTIN 600 MILLIGRAM(S): 400 CAPSULE ORAL at 12:40

## 2022-07-11 RX ADMIN — Medication 0.25 MILLIGRAM(S): at 09:07

## 2022-07-11 RX ADMIN — Medication 1000 MILLIGRAM(S): at 18:33

## 2022-07-11 RX ADMIN — SODIUM CHLORIDE 75 MILLILITER(S): 9 INJECTION, SOLUTION INTRAVENOUS at 18:13

## 2022-07-11 RX ADMIN — ATORVASTATIN CALCIUM 40 MILLIGRAM(S): 80 TABLET, FILM COATED ORAL at 21:17

## 2022-07-11 NOTE — DIETITIAN INITIAL EVALUATION ADULT - NS FNS DIET ORDER
Diet, NPO after Midnight:      NPO Start Date: 10-Jul-2022,   NPO Start Time: 23:59  Except Medications (07-10-22 @ 12:03)

## 2022-07-11 NOTE — PROGRESS NOTE ADULT - ASSESSMENT
72 y/o female with PMHx of HLD & COPD presenting with abdominal pain, found to have active diverticulitis on CT.    PLAN:  - Plan for OR today 7/11 with Dr. Olivo; medical & cardiac clearance on chart  - Pt has been NPO after midnight  - hydralazine 5mg x1 given this AM for elevated BP  - Continue Zosyn  - ERAS protocol  - Continue pain control as needed  - Monitor bowel function/serial abdominal exams  - Daily labs  - DVT Prophylaxis with subq heparin & SCDs 70 y/o female with PMHx of HLD & COPD presenting with abdominal pain, found to have active diverticulitis on CT.    PLAN:  - Plan for OR today 7/11 with Dr. Olivo; medical & cardiac clearance on chart  - Pt has been NPO after midnight  - hydralazine 5mg x1 IVP for elevated BP  - Continue Zosyn  - ERAS protocol  - Continue pain control as needed  - Monitor bowel function/serial abdominal exams  - Daily labs  - DVT Prophylaxis with subq heparin & SCDs 72 y/o female with PMHx of HLD & COPD presenting with abdominal pain, found to have active diverticulitis on CT.    PLAN:  - Plan for OR today 7/11 with Dr. Olivo; medical & cardiac clearance on chart  - Pt has been NPO after midnight  - hydralazine 5mg x1 IVP for elevated BP  - Xanax 0.5mg for anxiety  - Continue Zosyn  - ERAS protocol  - Continue pain control as needed  - Monitor bowel function/serial abdominal exams  - Daily labs  - DVT Prophylaxis with subq heparin & SCDs 70 y/o female with PMHx of HLD & COPD presenting with abdominal pain, found to have active diverticulitis on CT.    PLAN:  - Plan for OR today 7/11 with Dr. Olivo; medical & cardiac clearance on chart  - Pt has been NPO after midnight  - Continue Zosyn  - ERAS protocol  - Continue pain control as needed  - Monitor bowel function/serial abdominal exams  - Daily labs  - DVT Prophylaxis with subq heparin & SCDs

## 2022-07-11 NOTE — BRIEF OPERATIVE NOTE - COMMENTS
I, [place] PA-C provided direct first assist support to the surgeon during this surgical procedure. My involvement included positioning, prepping and draping the patient prior to surgery, ensuring clear visibility and exposure for the surgeon by using instruments such as retractors and suction, closing surgical incisions and dressing wounds. As well as other tasks as directed by the surgeon.

## 2022-07-11 NOTE — PROGRESS NOTE ADULT - SUBJECTIVE AND OBJECTIVE BOX
Post Operative Note  Patient: NGOZI YARBROUGH 71y (1950) Female   MRN: [id number]  Location: [place] [address] D1  Visit: 07-08-22 Inpatient  Date: 07-11-22 @ 21:41    Procedure: S/P Lap Sigmoidcolectomy    Subjective:   Patient seen and examined at bedside.  No events post-operatively.  Patient with no new complaints at this time besides mild post operative pain, no appetite at this time, denies flatus and bowel movement. +Brannon.  Patient denies any fevers, chills, chest pain, shortness of breath,  nausea, vomiting or diarrhea.    Objective:  Vitals: T(F): 97.4 (07-11-22 @ 20:04), Max: 99 (07-11-22 @ 05:11)  HR: 71 (07-11-22 @ 20:04)  BP: 154/74 (07-11-22 @ 20:04) (113/38 - 193/93)  RR: 20 (07-11-22 @ 20:04)  SpO2: 98% (07-11-22 @ 20:04)  Vent Settings:     In:   07-11-22 @ 07:01  -  07-11-22 @ 21:41  --------------------------------------------------------  IN: 250 mL      IV Fluids:     Out:   07-11-22 @ 07:01  -  07-11-22 @ 21:41  --------------------------------------------------------  OUT: 75 mL      EBL:     Voided Urine:   07-11-22 @ 07:01  -  07-11-22 @ 21:41  --------------------------------------------------------  OUT: 75 mL    PHYSICAL EXAM:  GENERAL: No acute distress, well-developed  HEAD:  Atraumatic, Normocephalic  ABDOMEN: Soft, appropriately-tender, minimally-distended; incisions clean dry and intact.  NEUROLOGY: A&O x 3, no focal deficits    Medications: [Standing]  acetaminophen   IVPB .. 1000 milliGRAM(s) IV Intermittent every 6 hours  atorvastatin 40 milliGRAM(s) Oral at bedtime  heparin   Injectable 5000 Unit(s) SubCutaneous every 8 hours  HYDROmorphone  Injectable 0.2 milliGRAM(s) IV Push every 2 hours PRN  ketorolac   Injectable 15 milliGRAM(s) IV Push every 6 hours  ondansetron Injectable 4 milliGRAM(s) IV Push every 6 hours PRN  oxyCODONE    IR 5 milliGRAM(s) Oral every 4 hours PRN  piperacillin/tazobactam IVPB.. 3.375 Gram(s) IV Intermittent every 8 hours  traZODone 100 milliGRAM(s) Oral at bedtime    Medications: [PRN]  acetaminophen   IVPB .. 1000 milliGRAM(s) IV Intermittent every 6 hours  atorvastatin 40 milliGRAM(s) Oral at bedtime  heparin   Injectable 5000 Unit(s) SubCutaneous every 8 hours  HYDROmorphone  Injectable 0.2 milliGRAM(s) IV Push every 2 hours PRN  ketorolac   Injectable 15 milliGRAM(s) IV Push every 6 hours  ondansetron Injectable 4 milliGRAM(s) IV Push every 6 hours PRN  oxyCODONE    IR 5 milliGRAM(s) Oral every 4 hours PRN  piperacillin/tazobactam IVPB.. 3.375 Gram(s) IV Intermittent every 8 hours  traZODone 100 milliGRAM(s) Oral at bedtime    Labs:                        12.3   5.30  )-----------( 378      ( 11 Jul 2022 08:22 )             36.6     07-11    143  |  113<H>  |  4<L>  ----------------------------<  107<H>  3.2<L>   |  23  |  0.50    Ca    9.1      11 Jul 2022 08:22    TPro  6.7  /  Alb  3.2<L>  /  TBili  0.4  /  DBili  x   /  AST  15  /  ALT  19  /  AlkPhos  54  07-11    PT/INR - ( 10 Jul 2022 06:05 )   PT: 14.0 sec;   INR: 1.19 ratio         PTT - ( 10 Jul 2022 06:05 )  PTT:32.9 sec      Imaging:  No post-op imaging studies    Assessment:  71yFemale patient S/P Lap Sigmoidcolectomy currently doing well post op    Plan:  - incentive spirometer  - pain control  - OOB  - CLD  - serial abdominal exams  - labs in am    Surgical Team Contact Information  Spectralink: Ext: [phone] or [phone]  Pager: [phone]    Date/Time: 07-11-22 @ 21:41

## 2022-07-11 NOTE — PROGRESS NOTE ADULT - SUBJECTIVE AND OBJECTIVE BOX
Pt seen and examined at bedside, denies any overnight events. Denies pain at rest. BP this AM found to be 193/93. Pt s/p tap water enema yesterday evening but only able to tolerate 250ml. Pt reports one small bowel movement this morning. Pt has been NPO since midnight. She reports some anxiety and nervousness about the procedure. Denies any nausea/vomiting.  Denies headache, chest pain, palpitations, shortness of breath, weakness or fatigue.    T(C): 36.6 (22 @ 05:36), Max: 37.1 (07-10-22 @ 14:05)  T(F): 97.9 (22 @ 05:36), Max: 98.7 (07-10-22 @ 14:05)  HR: 77 (22 @ 07:15) (69 - 82)  BP: 193/93 (22 @ 07:15) (131/78 - 193/93)  RR: 18 (22 @ 05:36) (18 - 18)  SpO2: 96% (22 @ 05:36) (95% - 96%)      PHYSICAL EXAM:  General: no acute distress, appears comfortable  Cardiac: RRR, S1 S2, no murmurs rubs or gallops  Pulm: non labored respirations  Abdomen: soft, suprapubic tenderness to palpation, nondistended, (+) BS  Extremities: no calf tenderness noted    I&O's Detail      LABS:                        11.8   6.14  )-----------( 363      ( 10 Jul 2022 06:05 )             36.3     07-10    141  |  111<H>  |  6<L>  ----------------------------<  98  3.5   |  20<L>  |  0.53    Ca    8.8      10 Jul 2022 06:05      PT/INR - ( 10 Jul 2022 06:05 )   PT: 14.0 sec;   INR: 1.19 ratio         PTT - ( 10 Jul 2022 06:05 )  PTT:32.9 sec    Ca    9.0      2022 08:33    TPro  7.4  /  Alb  3.4  /  TBili  0.5  /  DBili  x   /  AST  33  /  ALT  25  /  AlkPhos  61  07-08    Urinalysis Basic - ( 2022 11:45 )    Color: Pale Yellow / Appearance: Clear / S.005 / pH: x  Gluc: x / Ketone: Negative  / Bili: Negative / Urobili: Negative   Blood: x / Protein: Negative / Nitrite: Negative   Leuk Esterase: Trace / RBC: 0-2 /HPF / WBC 0-2   Sq Epi: x / Non Sq Epi: Occasional / Bacteria: x      MEDICATIONS  (STANDING):  acetaminophen     Tablet .. 1000 milliGRAM(s) Oral once  atorvastatin 40 milliGRAM(s) Oral at bedtime  gabapentin 600 milliGRAM(s) Oral once  heparin   Injectable 5000 Unit(s) SubCutaneous every 8 hours  hydrALAZINE Injectable 5 milliGRAM(s) IV Push once  melatonin 5 milliGRAM(s) Oral at bedtime  piperacillin/tazobactam IVPB.. 3.375 Gram(s) IV Intermittent every 8 hours  sodium chloride 0.9%. 1000 milliLiter(s) (70 mL/Hr) IV Continuous <Continuous>  traZODone 100 milliGRAM(s) Oral at bedtime    MEDICATIONS  (PRN):  HYDROmorphone   Tablet 0.5 milliGRAM(s) Oral every 4 hours PRN Severe Pain (7 - 10)  ketorolac   Injectable 15 milliGRAM(s) IV Push every 6 hours PRN Moderate Pain (4 - 6)  ondansetron Injectable 4 milliGRAM(s) IV Push every 6 hours PRN Nausea       Pt seen and examined at bedside, denies any overnight events. Denies pain at rest. Plan for OR today w/ Dr. Olivo. BP this AM found to be 193/93, given hydralazine 5mg x1. Pt s/p tap water enema yesterday evening but only able to tolerate 250ml. Pt reports one small bowel movement this morning. Pt has been NPO since midnight. She reports some anxiety and nervousness about the procedure. Denies any nausea/vomiting. Denies headache, chest pain, palpitations, shortness of breath, weakness or fatigue.    T(C): 36.6 (22 @ 05:36), Max: 37.1 (07-10-22 @ 14:05)  T(F): 97.9 (22 @ 05:36), Max: 98.7 (07-10-22 @ 14:05)  HR: 77 (22 @ 07:15) (69 - 82)  BP: 193/93 (22 @ 07:15) (131/78 - 193/)  RR: 18 (22 @ 05:36) (18 - 18)  SpO2: 96% (22 @ 05:36) (95% - 96%)      PHYSICAL EXAM:  General: no acute distress, appears comfortable  Cardiac: RRR, S1 S2, no murmurs rubs or gallops  Pulm: non labored respirations  Abdomen: soft, suprapubic tenderness to palpation, nondistended, (+) BS  Extremities: no calf tenderness noted, no edema    I&O's Detail      LABS:                        11.8   6.14  )-----------( 363      ( 10 Jul 2022 06:05 )             36.3     07-10    141  |  111<H>  |  6<L>  ----------------------------<  98  3.5   |  20<L>  |  0.53    Ca    8.8      10 Jul 2022 06:05      PT/INR - ( 10 Jul 2022 06:05 )   PT: 14.0 sec;   INR: 1.19 ratio         PTT - ( 10 Jul 2022 06:05 )  PTT:32.9 sec    Ca    9.0      2022 08:33    TPro  7.4  /  Alb  3.4  /  TBili  0.5  /  DBili  x   /  AST  33  /  ALT  25  /  AlkPhos  61  07-08    Urinalysis Basic - ( 2022 11:45 )    Color: Pale Yellow / Appearance: Clear / S.005 / pH: x  Gluc: x / Ketone: Negative  / Bili: Negative / Urobili: Negative   Blood: x / Protein: Negative / Nitrite: Negative   Leuk Esterase: Trace / RBC: 0-2 /HPF / WBC 0-2   Sq Epi: x / Non Sq Epi: Occasional / Bacteria: x      MEDICATIONS  (STANDING):  acetaminophen     Tablet .. 1000 milliGRAM(s) Oral once  atorvastatin 40 milliGRAM(s) Oral at bedtime  gabapentin 600 milliGRAM(s) Oral once  heparin   Injectable 5000 Unit(s) SubCutaneous every 8 hours  hydrALAZINE Injectable 5 milliGRAM(s) IV Push once  melatonin 5 milliGRAM(s) Oral at bedtime  piperacillin/tazobactam IVPB.. 3.375 Gram(s) IV Intermittent every 8 hours  sodium chloride 0.9%. 1000 milliLiter(s) (70 mL/Hr) IV Continuous <Continuous>  traZODone 100 milliGRAM(s) Oral at bedtime    MEDICATIONS  (PRN):  HYDROmorphone   Tablet 0.5 milliGRAM(s) Oral every 4 hours PRN Severe Pain (7 - 10)  ketorolac   Injectable 15 milliGRAM(s) IV Push every 6 hours PRN Moderate Pain (4 - 6)  ondansetron Injectable 4 milliGRAM(s) IV Push every 6 hours PRN Nausea       Pt seen and examined at bedside, denies any overnight events. Denies pain at rest. Plan for OR today w/ Dr. Olivo. BP this AM found to be 193/93. Pt s/p tap water enema yesterday evening but only able to tolerate 250ml. Pt reports one small bowel movement this morning. Pt has been NPO since midnight. She reports some anxiety and nervousness about the procedure. Denies any nausea/vomiting. Denies headache, chest pain, palpitations, shortness of breath, weakness or fatigue.    T(C): 36.6 (22 @ 05:36), Max: 37.1 (07-10-22 @ 14:05)  T(F): 97.9 (22 @ 05:36), Max: 98.7 (07-10-22 @ 14:05)  HR: 77 (22 @ 07:15) (69 - 82)  BP: 193/93 (22 @ 07:15) (131/78 - 193/)  RR: 18 (22 @ 05:36) (18 - 18)  SpO2: 96% (22 @ 05:36) (95% - 96%)      PHYSICAL EXAM:  General: no acute distress, appears comfortable  Cardiac: RRR, S1 S2, no murmurs rubs or gallops  Pulm: non labored respirations  Abdomen: soft, suprapubic tenderness to palpation, nondistended, (+) BS  Extremities: no calf tenderness noted, no edema    I&O's Detail      LABS:                        11.8   6.14  )-----------( 363      ( 10 Jul 2022 06:05 )             36.3     07-10    141  |  111<H>  |  6<L>  ----------------------------<  98  3.5   |  20<L>  |  0.53    Ca    8.8      10 Jul 2022 06:05      PT/INR - ( 10 Jul 2022 06:05 )   PT: 14.0 sec;   INR: 1.19 ratio         PTT - ( 10 Jul 2022 06:05 )  PTT:32.9 sec    Ca    9.0      2022 08:33    TPro  7.4  /  Alb  3.4  /  TBili  0.5  /  DBili  x   /  AST  33  /  ALT  25  /  AlkPhos  61  07-08    Urinalysis Basic - ( 2022 11:45 )    Color: Pale Yellow / Appearance: Clear / S.005 / pH: x  Gluc: x / Ketone: Negative  / Bili: Negative / Urobili: Negative   Blood: x / Protein: Negative / Nitrite: Negative   Leuk Esterase: Trace / RBC: 0-2 /HPF / WBC 0-2   Sq Epi: x / Non Sq Epi: Occasional / Bacteria: x      MEDICATIONS  (STANDING):  acetaminophen     Tablet .. 1000 milliGRAM(s) Oral once  atorvastatin 40 milliGRAM(s) Oral at bedtime  gabapentin 600 milliGRAM(s) Oral once  heparin   Injectable 5000 Unit(s) SubCutaneous every 8 hours  hydrALAZINE Injectable 5 milliGRAM(s) IV Push once  melatonin 5 milliGRAM(s) Oral at bedtime  piperacillin/tazobactam IVPB.. 3.375 Gram(s) IV Intermittent every 8 hours  sodium chloride 0.9%. 1000 milliLiter(s) (70 mL/Hr) IV Continuous <Continuous>  traZODone 100 milliGRAM(s) Oral at bedtime    MEDICATIONS  (PRN):  HYDROmorphone   Tablet 0.5 milliGRAM(s) Oral every 4 hours PRN Severe Pain (7 - 10)  ketorolac   Injectable 15 milliGRAM(s) IV Push every 6 hours PRN Moderate Pain (4 - 6)  ondansetron Injectable 4 milliGRAM(s) IV Push every 6 hours PRN Nausea       Pt seen and examined at bedside, denies any overnight events. Denies pain at rest. Plan for OR today w/ Dr. Olivo. BP this AM found to be 193/93. Pt s/p tap water enema yesterday evening but only able to tolerate 250ml. Pt reports one small bowel movement this morning. Pt has been NPO since midnight. She reports some anxiety and nervousness about the procedure, will give 0.5MG Xanax. Denies any nausea/vomiting. Denies headache, chest pain, palpitations, shortness of breath, weakness or fatigue.    T(C): 36.6 (22 @ 05:36), Max: 37.1 (07-10-22 @ 14:05)  T(F): 97.9 (22 @ 05:36), Max: 98.7 (07-10-22 @ 14:05)  HR: 77 (22 @ 07:15) (69 - 82)  BP: 193/93 (22 @ 07:15) (131/78 - 193/93)  RR: 18 (22 @ 05:36) (18 - 18)  SpO2: 96% (22 @ 05:36) (95% - 96%)      PHYSICAL EXAM:  General: no acute distress, appears comfortable  Cardiac: RRR, S1 S2, no murmurs rubs or gallops  Pulm: non labored respirations  Abdomen: soft, suprapubic tenderness to palpation, nondistended, (+) BS  Extremities: no calf tenderness noted, no edema    I&O's Detail      LABS:                        11.8   6.14  )-----------( 363      ( 10 Jul 2022 06:05 )             36.3     07-10    141  |  111<H>  |  6<L>  ----------------------------<  98  3.5   |  20<L>  |  0.53    Ca    8.8      10 Jul 2022 06:05      PT/INR - ( 10 Jul 2022 06:05 )   PT: 14.0 sec;   INR: 1.19 ratio         PTT - ( 10 Jul 2022 06:05 )  PTT:32.9 sec    Ca    9.0      2022 08:33    TPro  7.4  /  Alb  3.4  /  TBili  0.5  /  DBili  x   /  AST  33  /  ALT  25  /  AlkPhos  61  07-08    Urinalysis Basic - ( 2022 11:45 )    Color: Pale Yellow / Appearance: Clear / S.005 / pH: x  Gluc: x / Ketone: Negative  / Bili: Negative / Urobili: Negative   Blood: x / Protein: Negative / Nitrite: Negative   Leuk Esterase: Trace / RBC: 0-2 /HPF / WBC 0-2   Sq Epi: x / Non Sq Epi: Occasional / Bacteria: x      MEDICATIONS  (STANDING):  acetaminophen     Tablet .. 1000 milliGRAM(s) Oral once  atorvastatin 40 milliGRAM(s) Oral at bedtime  gabapentin 600 milliGRAM(s) Oral once  heparin   Injectable 5000 Unit(s) SubCutaneous every 8 hours  hydrALAZINE Injectable 5 milliGRAM(s) IV Push once  melatonin 5 milliGRAM(s) Oral at bedtime  piperacillin/tazobactam IVPB.. 3.375 Gram(s) IV Intermittent every 8 hours  sodium chloride 0.9%. 1000 milliLiter(s) (70 mL/Hr) IV Continuous <Continuous>  traZODone 100 milliGRAM(s) Oral at bedtime    MEDICATIONS  (PRN):  HYDROmorphone   Tablet 0.5 milliGRAM(s) Oral every 4 hours PRN Severe Pain (7 - 10)  ketorolac   Injectable 15 milliGRAM(s) IV Push every 6 hours PRN Moderate Pain (4 - 6)  ondansetron Injectable 4 milliGRAM(s) IV Push every 6 hours PRN Nausea       Pt seen and examined at bedside, denies any overnight events. Denies pain at rest. Plan for OR today w/ Dr. Olivo. BP this AM found to be 193/93, given hydralazine 5mg x1 IVP. Pt s/p tap water enema yesterday evening but only able to tolerate 250ml. Pt reports one small bowel movement this morning. Pt has been NPO since midnight. She reports some anxiety and nervousness about the procedure, will give xanax 0.25MG x1. Denies any nausea/vomiting. Denies headache, chest pain, palpitations, shortness of breath, weakness or fatigue.    T(C): 36.6 (22 @ 05:36), Max: 37.1 (07-10-22 @ 14:05)  T(F): 97.9 (22 @ 05:36), Max: 98.7 (07-10-22 @ 14:05)  HR: 77 (22 @ 07:15) (69 - 82)  BP: 193/93 (22 @ 07:15) (131/78 - 193/93)  RR: 18 (22 @ 05:36) (18 - 18)  SpO2: 96% (22 @ 05:36) (95% - 96%)      PHYSICAL EXAM:  General: no acute distress, appears comfortable  Cardiac: RRR, S1 S2, no murmurs rubs or gallops  Pulm: non labored respirations  Abdomen: soft, suprapubic tenderness to palpation, nondistended, (+) BS  Extremities: no calf tenderness noted, no edema    I&O's Detail      LABS:                        11.8   6.14  )-----------( 363      ( 10 Jul 2022 06:05 )             36.3     07-10    141  |  111<H>  |  6<L>  ----------------------------<  98  3.5   |  20<L>  |  0.53    Ca    8.8      10 Jul 2022 06:05      PT/INR - ( 10 Jul 2022 06:05 )   PT: 14.0 sec;   INR: 1.19 ratio         PTT - ( 10 Jul 2022 06:05 )  PTT:32.9 sec    Ca    9.0      2022 08:33    TPro  7.4  /  Alb  3.4  /  TBili  0.5  /  DBili  x   /  AST  33  /  ALT  25  /  AlkPhos  61  07-08    Urinalysis Basic - ( 2022 11:45 )    Color: Pale Yellow / Appearance: Clear / S.005 / pH: x  Gluc: x / Ketone: Negative  / Bili: Negative / Urobili: Negative   Blood: x / Protein: Negative / Nitrite: Negative   Leuk Esterase: Trace / RBC: 0-2 /HPF / WBC 0-2   Sq Epi: x / Non Sq Epi: Occasional / Bacteria: x      MEDICATIONS  (STANDING):  acetaminophen     Tablet .. 1000 milliGRAM(s) Oral once  atorvastatin 40 milliGRAM(s) Oral at bedtime  gabapentin 600 milliGRAM(s) Oral once  heparin   Injectable 5000 Unit(s) SubCutaneous every 8 hours  hydrALAZINE Injectable 5 milliGRAM(s) IV Push once  melatonin 5 milliGRAM(s) Oral at bedtime  piperacillin/tazobactam IVPB.. 3.375 Gram(s) IV Intermittent every 8 hours  sodium chloride 0.9%. 1000 milliLiter(s) (70 mL/Hr) IV Continuous <Continuous>  traZODone 100 milliGRAM(s) Oral at bedtime    MEDICATIONS  (PRN):  HYDROmorphone   Tablet 0.5 milliGRAM(s) Oral every 4 hours PRN Severe Pain (7 - 10)  ketorolac   Injectable 15 milliGRAM(s) IV Push every 6 hours PRN Moderate Pain (4 - 6)  ondansetron Injectable 4 milliGRAM(s) IV Push every 6 hours PRN Nausea       Pt seen and examined at bedside, denies any overnight events. Denies pain at rest. Plan for OR today w/ Dr. Olivo. BP this AM found to be 193/93, given hydralazine 5mg x1 IVP. Pt s/p tap water enema yesterday evening but only able to tolerate 250ml. Pt reports one small bowel movement this morning. Pt has been NPO since midnight. She reports some anxiety and nervousness about the procedure, will give xanax 0.25MG x1. Denies any nausea/vomiting. Denies headache, chest pain, palpitations, shortness of breath, weakness or fatigue.    T(C): 36.6 (22 @ 05:36), Max: 37.1 (07-10-22 @ 14:05)  T(F): 97.9 (22 @ 05:36), Max: 98.7 (07-10-22 @ 14:05)  HR: 77 (22 @ 07:15) (69 - 82)  BP: 193/93 (22 @ 07:15) (131/78 - 193/93)  RR: 18 (22 @ 05:36) (18 - 18)  SpO2: 96% (22 @ 05:36) (95% - 96%)      PHYSICAL EXAM:  General: no acute distress, appears comfortable  Cardiac: RRR, S1 S2, no murmurs rubs or gallops  Pulm: non labored respirations  Abdomen: soft, suprapubic tenderness to palpation, nondistended, (+) BS  Extremities: no calf tenderness noted, no edema    I&O's Detail      LABS:                        12.3   5.30  )-----------( 378      ( 2022 08:22 )             36.6     07-11    143  |  113<H>  |  4<L>  ----------------------------<  107<H>  3.2<L>   |  23  |  0.50    Ca    9.1      2022 08:22    TPro  6.7  /  Alb  3.2<L>  /  TBili  0.4  /  DBili  x   /  AST  15  /  ALT  19  /  AlkPhos  54  07-    PT/INR - ( 10 Jul 2022 06:05 )   PT: 14.0 sec;   INR: 1.19 ratio         PTT - ( 10 Jul 2022 06:05 )  PTT:32.9 sec        Urinalysis Basic - ( 2022 11:45 )    Color: Pale Yellow / Appearance: Clear / S.005 / pH: x  Gluc: x / Ketone: Negative  / Bili: Negative / Urobili: Negative   Blood: x / Protein: Negative / Nitrite: Negative   Leuk Esterase: Trace / RBC: 0-2 /HPF / WBC 0-2   Sq Epi: x / Non Sq Epi: Occasional / Bacteria: x      MEDICATIONS  (STANDING):  acetaminophen     Tablet .. 1000 milliGRAM(s) Oral once  atorvastatin 40 milliGRAM(s) Oral at bedtime  gabapentin 600 milliGRAM(s) Oral once  heparin   Injectable 5000 Unit(s) SubCutaneous every 8 hours  hydrALAZINE Injectable 5 milliGRAM(s) IV Push once  melatonin 5 milliGRAM(s) Oral at bedtime  piperacillin/tazobactam IVPB.. 3.375 Gram(s) IV Intermittent every 8 hours  sodium chloride 0.9%. 1000 milliLiter(s) (70 mL/Hr) IV Continuous <Continuous>  traZODone 100 milliGRAM(s) Oral at bedtime    MEDICATIONS  (PRN):  HYDROmorphone   Tablet 0.5 milliGRAM(s) Oral every 4 hours PRN Severe Pain (7 - 10)  ketorolac   Injectable 15 milliGRAM(s) IV Push every 6 hours PRN Moderate Pain (4 - 6)  ondansetron Injectable 4 milliGRAM(s) IV Push every 6 hours PRN Nausea

## 2022-07-11 NOTE — PROGRESS NOTE ADULT - ASSESSMENT
72 y/o female with PMHx of HLD, COPD, and multiple bouts of diverticulitis, presenting with abdominal pain admitted for recurrent acute diverticulitis, plan for sigmoid colectomy on Monday 7/11.   Consulted for medical clearance.      Problem/Recommendation - 1:  ·  Problem: Diverticulitis. -  Pt presents with abdominal pain   - CT A/P: Focal mural thickening at the proximal sigmoid colon with adjacent stranding and diverticulosis, compatible with diverticulitis. No associated abscess is identified.   - Plan for surgery on Monday 7/11 with Dr. Olivo   - NPO for OR  - had bowel prep before surgery  - c/w zosyn  - Monitor fever and leukocytosis --- resolved  - cardio ([facility]), recs appreciated  - RCRI: 1, Class II Risk; Pt is low risk for intermediate risk procedure, pt is optimized to proceed with planned procedure with routine hemodynamic monitoring.     Problem/Recommendation - 2:  ·  Problem: HLD (hyperlipidemia).   ·  Recommendation: Chronic  Continue statin.       Problem/Recommendation - 3  hx  mood dis / anxiety - c/w Trazadone 100 mg po daily      Problem/Recommendation - 4  ·  Problem: Need for prophylactic measure.   ·  Recommendation: DVT PPX as per Surgery: HSQ held after this morning's dose  - SCDs

## 2022-07-11 NOTE — PROGRESS NOTE ADULT - SUBJECTIVE AND OBJECTIVE BOX
[facility] -- Wu Moreno, Chay Concepcion, Ameya Garay Savella, [facility]  Office # [phone]    Follow Up:  Cardiac clearance, HLD    Subjective/Observations: No events overnight resting comfortably in bed.  No complaints of chest pain, dyspnea, or palpitations reported. No signs of orthopnea or PND.  Reports feeling anxious re: enema/prep and procedure.   BP elevated this am, 193/93, s/p hydralazine 5mg IVP       REVIEW OF SYSTEMS: All other review of systems is negative unless indicated above  PAST MEDICAL & SURGICAL HISTORY:  Sigmoid diverticulitis      HLD (hyperlipidemia)      COPD (chronic obstructive pulmonary disease)        MEDICATIONS  (STANDING):  acetaminophen     Tablet .. 1000 milliGRAM(s) Oral once  atorvastatin 40 milliGRAM(s) Oral at bedtime  dextrose 5% + sodium chloride 0.45% with potassium chloride 20 mEq/L 1000 milliLiter(s) (70 mL/Hr) IV Continuous <Continuous>  gabapentin 600 milliGRAM(s) Oral once  heparin   Injectable 5000 Unit(s) SubCutaneous every 8 hours  melatonin 5 milliGRAM(s) Oral at bedtime  piperacillin/tazobactam IVPB.. 3.375 Gram(s) IV Intermittent every 8 hours  traZODone 100 milliGRAM(s) Oral at bedtime    MEDICATIONS  (PRN):  HYDROmorphone   Tablet 0.5 milliGRAM(s) Oral every 4 hours PRN Severe Pain (7 - 10)  ketorolac   Injectable 15 milliGRAM(s) IV Push every 6 hours PRN Moderate Pain (4 - 6)  ondansetron Injectable 4 milliGRAM(s) IV Push every 6 hours PRN Nausea    Allergies    No Known Allergies    Intolerances      Vital Signs Last 24 Hrs  T(C): 36.9 (11 Jul 2022 09:31), Max: 37.1 (10 Jul 2022 14:05)  T(F): 98.4 (11 Jul 2022 09:31), Max: 98.7 (10 Jul 2022 14:05)  HR: 79 (11 Jul 2022 09:31) (69 - 82)  BP: 170/72 (11 Jul 2022 09:31) (131/78 - 193/93)  BP(mean): --  RR: 18 (11 Jul 2022 09:31) (18 - 18)  SpO2: 94% (11 Jul 2022 09:31) (94% - 97%)    Parameters below as of 11 Jul 2022 09:31  Patient On (Oxygen Delivery Method): room air      I&O's Summary        TELE: Not on telemetry   PHYSICAL EXAM:  Constitutional: NAD, awake and alert, Well developed   HEENT: Moist Mucous Membranes, Anicteric  Pulmonary: Non-labored, breath sounds are clear bilaterally, No wheezing, rales or rhonchi  Cardiovascular: Regular, S1 and S2, No murmurs, No rubs, gallops or clicks  Gastrointestinal:  soft, nontender, nondistended   Lymph: No peripheral edema. No lymphadenopathy.   Skin: No visible rashes or ulcers.  Psych:  anxious      LABS: All Labs Reviewed:                        12.3   5.30  )-----------( 378      ( 11 Jul 2022 08:22 )             36.6                         11.8   6.14  )-----------( 363      ( 10 Jul 2022 06:05 )             36.3                         12.4   6.89  )-----------( 350      ( 09 Jul 2022 08:33 )             38.0     11 Jul 2022 08:22    143    |  113    |  4      ----------------------------<  107    3.2     |  23     |  0.50   10 Jul 2022 06:05    141    |  111    |  6      ----------------------------<  98     3.5     |  20     |  0.53   09 Jul 2022 08:33    140    |  107    |  8      ----------------------------<  158    3.6     |  24     |  0.51     Ca    9.1        11 Jul 2022 08:22  Ca    8.8        10 Jul 2022 06:05  Ca    9.0        09 Jul 2022 08:33    TPro  6.7    /  Alb  3.2    /  TBili  0.4    /  DBili  x      /  AST  15     /  ALT  19     /  AlkPhos  54     11 Jul 2022 08:22  TPro  7.4    /  Alb  3.4    /  TBili  0.5    /  DBili  x      /  AST  33     /  ALT  25     /  AlkPhos  61     08 Jul 2022 10:35    PT/INR - ( 10 Jul 2022 06:05 )   PT: 14.0 sec;   INR: 1.19 ratio         PTT - ( 10 Jul 2022 06:05 )  PTT:32.9 sec

## 2022-07-11 NOTE — DIETITIAN INITIAL EVALUATION ADULT - PERTINENT LABORATORY DATA
07-11    143  |  113<H>  |  4<L>  ----------------------------<  107<H>  3.2<L>   |  23  |  0.50    Ca    9.1      11 Jul 2022 08:22    TPro  6.7  /  Alb  3.2<L>  /  TBili  0.4  /  DBili  x   /  AST  15  /  ALT  19  /  AlkPhos  54  07-11

## 2022-07-11 NOTE — PROGRESS NOTE ADULT - ASSESSMENT
72 y/o female with PMHx of HLD & COPD presenting with abdominal pain, found to have active diverticulitis on CT. Cardiology consulted for preop in anticipation of lap sigmoid colectomy on 7/11/22.    Preop, HLD  - Denies chest pain, palpitation, SOB orthopnea, PND, HUGHES and edema  - EKG demonstrates NSR. No acute ischemic changes noted.   - Patient euvolemic on examination with no overt signs of heart failure or cardiac ischemia.   - No severe valvular abnormalities noted on examination  - No evidence of significant arrhythmia   - Would check routine ECHO to r/o valvular abnormalities and to assess for pulmonary hypertension and heart function.   - Pt has no active ischemia, decompensated heart failure, unstable arrythmia, or severe stenotic valvular disease. Patient is optimized from cardiovascular standpoint to proceed with planned procedure with routine hemodynamic monitoring. Plan for OR today.    -If BP remains elevated, start on Amlodipine 5mg po daily  - Continue statin    - Monitor and replete lytes, keep K>4, Mg>2.  - Will continue to follow.    Radha Hernandez NP  Nurse Practitioner- Cardiology   Spectra #[phone]

## 2022-07-11 NOTE — PROGRESS NOTE ADULT - SUBJECTIVE AND OBJECTIVE BOX
Patient is a 71y old  Female who presents with a chief complaint of abd pain.      INTERVAL HPI/OVERNIGHT EVENTS: Pt states abd pain improving. Denies nausea, vomiting, fever, chills, CP, SOB.     MEDICATIONS  (STANDING):  acetaminophen   IVPB .. 1000 milliGRAM(s) IV Intermittent every 6 hours  atorvastatin 40 milliGRAM(s) Oral at bedtime  heparin   Injectable 5000 Unit(s) SubCutaneous every 8 hours  ketorolac   Injectable 15 milliGRAM(s) IV Push every 6 hours  piperacillin/tazobactam IVPB.. 3.375 Gram(s) IV Intermittent every 8 hours  traZODone 100 milliGRAM(s) Oral at bedtime    MEDICATIONS  (PRN):  HYDROmorphone  Injectable 0.2 milliGRAM(s) IV Push every 2 hours PRN Severe Pain (7 - 10)  ondansetron Injectable 4 milliGRAM(s) IV Push every 6 hours PRN Nausea  oxyCODONE    IR 5 milliGRAM(s) Oral every 4 hours PRN Moderate Pain (4 - 6)      Allergies    No Known Allergies    Intolerances        REVIEW OF SYSTEMS:  CONSTITUTIONAL: No fever or chills  HEENT:  No headache, no sore throat  RESPIRATORY: No cough, wheezing, or shortness of breath  CARDIOVASCULAR: No chest pain, palpitations  GASTROINTESTINAL: abd pain improved, denies nausea, vomiting, or diarrhea  GENITOURINARY: No dysuria, frequency, or hematuria  NEUROLOGICAL: no focal weakness or dizziness  MUSCULOSKELETAL: no myalgias     Vital Signs Last 24 Hrs  T(C): 36.9 (11 Jul 2022 09:31), Max: 37.1 (10 Jul 2022 14:05)  T(F): 98.4 (11 Jul 2022 09:31), Max: 98.7 (10 Jul 2022 14:05)  HR: 79 (11 Jul 2022 09:31) (69 - 82)  BP: 170/72 (11 Jul 2022 09:31) (131/78 - 193/93)  BP(mean): --  RR: 18 (11 Jul 2022 09:31) (18 - 18)  SpO2: 94% (11 Jul 2022 09:31) (94% - 97%)    Parameters below as of 11 Jul 2022 09:31  Patient On (Oxygen Delivery Method): room air      PHYSICAL EXAM:  GENERAL: NAD  HEENT:  anicteric, moist mucous membranes  CHEST/LUNG:  CTA b/l, no rales, wheezes, or rhonchi  HEART:  RRR, S1, S2  ABDOMEN:  BS+, soft, nontender, nondistended  EXTREMITIES: no cyanosis, or calf tenderness  NERVOUS SYSTEM: answers questions and follows commands appropriately    LABS:                        12.3   5.30  )-----------( 378      ( 11 Jul 2022 08:22 )             36.6     CBC Full  -  ( 11 Jul 2022 08:22 )  WBC Count : 5.30 K/uL  Hemoglobin : 12.3 g/dL  Hematocrit : 36.6 %  Platelet Count - Automated : 378 K/uL  Mean Cell Volume : 90.6 fl  Mean Cell Hemoglobin : 30.4 pg  Mean Cell Hemoglobin Concentration : 33.6 gm/dL  Auto Neutrophil # : 3.96 K/uL  Auto Lymphocyte # : 0.86 K/uL  Auto Monocyte # : 0.31 K/uL  Auto Eosinophil # : 0.10 K/uL  Auto Basophil # : 0.05 K/uL  Auto Neutrophil % : 74.8 %  Auto Lymphocyte % : 16.2 %  Auto Monocyte % : 5.8 %  Auto Eosinophil % : 1.9 %  Auto Basophil % : 0.9 %    11 Jul 2022 08:22    143    |  113    |  4      ----------------------------<  107    3.2     |  23     |  0.50     Ca    9.1        11 Jul 2022 08:22    TPro  6.7    /  Alb  3.2    /  TBili  0.4    /  DBili  x      /  AST  15     /  ALT  19     /  AlkPhos  54     11 Jul 2022 08:22          Culture - Urine (collected 07-08-22 @ 11:45)  Source: Clean Catch Clean Catch (Midstream)  Final Report (07-10-22 @ 07:28):    <10,000 CFU/mL Normal Urogenital Inocencia        RADIOLOGY & ADDITIONAL TESTS:    Personally reviewed.     Consultant(s) Notes Reviewed:  [x] YES  [ ] NO     Patient is a 71y old  Female who presents with a chief complaint of abd pain.       INTERVAL HPI/OVERNIGHT EVENTS: Pt states abd pain improving. Denies nausea, vomiting, fever, chills, CP, SOB.     MEDICATIONS  (STANDING):  acetaminophen   IVPB .. 1000 milliGRAM(s) IV Intermittent every 6 hours  atorvastatin 40 milliGRAM(s) Oral at bedtime  heparin   Injectable 5000 Unit(s) SubCutaneous every 8 hours  ketorolac   Injectable 15 milliGRAM(s) IV Push every 6 hours  piperacillin/tazobactam IVPB.. 3.375 Gram(s) IV Intermittent every 8 hours  traZODone 100 milliGRAM(s) Oral at bedtime    MEDICATIONS  (PRN):  HYDROmorphone  Injectable 0.2 milliGRAM(s) IV Push every 2 hours PRN Severe Pain (7 - 10)  ondansetron Injectable 4 milliGRAM(s) IV Push every 6 hours PRN Nausea  oxyCODONE    IR 5 milliGRAM(s) Oral every 4 hours PRN Moderate Pain (4 - 6)      Allergies    No Known Allergies    Intolerances        REVIEW OF SYSTEMS:  CONSTITUTIONAL: No fever or chills  HEENT:  No headache, no sore throat  RESPIRATORY: No cough, wheezing, or shortness of breath  CARDIOVASCULAR: No chest pain, palpitations  GASTROINTESTINAL: abd pain improved, denies nausea, vomiting, or diarrhea  GENITOURINARY: No dysuria, frequency, or hematuria  NEUROLOGICAL: no focal weakness or dizziness  MUSCULOSKELETAL: no myalgias     Vital Signs Last 24 Hrs  T(C): 36.9 (11 Jul 2022 09:31), Max: 37.1 (10 Jul 2022 14:05)  T(F): 98.4 (11 Jul 2022 09:31), Max: 98.7 (10 Jul 2022 14:05)  HR: 79 (11 Jul 2022 09:31) (69 - 82)  BP: 170/72 (11 Jul 2022 09:31) (131/78 - 193/93)  BP(mean): --  RR: 18 (11 Jul 2022 09:31) (18 - 18)  SpO2: 94% (11 Jul 2022 09:31) (94% - 97%)    Parameters below as of 11 Jul 2022 09:31  Patient On (Oxygen Delivery Method): room air      PHYSICAL EXAM:  GENERAL: NAD  HEENT:  anicteric, moist mucous membranes  CHEST/LUNG:  CTA b/l, no rales, wheezes, or rhonchi  HEART:  RRR, S1, S2  ABDOMEN:  BS+, soft, nontender, nondistended  EXTREMITIES: no cyanosis, or calf tenderness  NERVOUS SYSTEM: answers questions and follows commands appropriately    LABS:                        12.3   5.30  )-----------( 378      ( 11 Jul 2022 08:22 )             36.6     CBC Full  -  ( 11 Jul 2022 08:22 )  WBC Count : 5.30 K/uL  Hemoglobin : 12.3 g/dL  Hematocrit : 36.6 %  Platelet Count - Automated : 378 K/uL  Mean Cell Volume : 90.6 fl  Mean Cell Hemoglobin : 30.4 pg  Mean Cell Hemoglobin Concentration : 33.6 gm/dL  Auto Neutrophil # : 3.96 K/uL  Auto Lymphocyte # : 0.86 K/uL  Auto Monocyte # : 0.31 K/uL  Auto Eosinophil # : 0.10 K/uL  Auto Basophil # : 0.05 K/uL  Auto Neutrophil % : 74.8 %  Auto Lymphocyte % : 16.2 %  Auto Monocyte % : 5.8 %  Auto Eosinophil % : 1.9 %  Auto Basophil % : 0.9 %    11 Jul 2022 08:22    143    |  113    |  4      ----------------------------<  107    3.2     |  23     |  0.50     Ca    9.1        11 Jul 2022 08:22    TPro  6.7    /  Alb  3.2    /  TBili  0.4    /  DBili  x      /  AST  15     /  ALT  19     /  AlkPhos  54     11 Jul 2022 08:22          Culture - Urine (collected 07-08-22 @ 11:45)  Source: Clean Catch Clean Catch (Midstream)  Final Report (07-10-22 @ 07:28):    <10,000 CFU/mL Normal Urogenital Inocencia        RADIOLOGY & ADDITIONAL TESTS:    Personally reviewed.     Consultant(s) Notes Reviewed:  [x] YES  [ ] NO

## 2022-07-11 NOTE — DIETITIAN INITIAL EVALUATION ADULT - PERTINENT MEDS FT
MEDICATIONS  (STANDING):  acetaminophen     Tablet .. 1000 milliGRAM(s) Oral once  atorvastatin 40 milliGRAM(s) Oral at bedtime  dextrose 5% + sodium chloride 0.45% with potassium chloride 20 mEq/L 1000 milliLiter(s) (70 mL/Hr) IV Continuous <Continuous>  gabapentin 600 milliGRAM(s) Oral once  heparin   Injectable 5000 Unit(s) SubCutaneous every 8 hours  melatonin 5 milliGRAM(s) Oral at bedtime  piperacillin/tazobactam IVPB.. 3.375 Gram(s) IV Intermittent every 8 hours  traZODone 100 milliGRAM(s) Oral at bedtime    MEDICATIONS  (PRN):  HYDROmorphone   Tablet 0.5 milliGRAM(s) Oral every 4 hours PRN Severe Pain (7 - 10)  ketorolac   Injectable 15 milliGRAM(s) IV Push every 6 hours PRN Moderate Pain (4 - 6)  ondansetron Injectable 4 milliGRAM(s) IV Push every 6 hours PRN Nausea

## 2022-07-11 NOTE — DIETITIAN INITIAL EVALUATION ADULT - OTHER INFO
72 y/o female adm with diverticulitis. PMH COPD, HLD, diverticulitis. Pt NPO after MN this AM for surgery at 10am. Met with pt this am at bedside. Provided pt with verbal and written information re: low fiber diet for diet progression after OR. CL to low fiber is recommended. Pt verbalized understanding, compliance expected.

## 2022-07-11 NOTE — PROGRESS NOTE ADULT - NS ATTEND AMEND GEN_ALL_CORE FT
BP elevated, in the setting of anxiety  no issue with proceeding to or today. routine monitoring is recommended.

## 2022-07-12 LAB
ANION GAP SERPL CALC-SCNC: 10 MMOL/L — SIGNIFICANT CHANGE UP (ref 5–17)
BUN SERPL-MCNC: 6 MG/DL — LOW (ref 7–23)
CALCIUM SERPL-MCNC: 8.8 MG/DL — SIGNIFICANT CHANGE UP (ref 8.5–10.1)
CHLORIDE SERPL-SCNC: 105 MMOL/L — SIGNIFICANT CHANGE UP (ref 96–108)
CO2 SERPL-SCNC: 24 MMOL/L — SIGNIFICANT CHANGE UP (ref 22–31)
CREAT SERPL-MCNC: 0.78 MG/DL — SIGNIFICANT CHANGE UP (ref 0.5–1.3)
EGFR: 81 ML/MIN/1.73M2 — SIGNIFICANT CHANGE UP
GLUCOSE SERPL-MCNC: 211 MG/DL — HIGH (ref 70–99)
HCT VFR BLD CALC: 38.5 % — SIGNIFICANT CHANGE UP (ref 34.5–45)
HGB BLD-MCNC: 12.4 G/DL — SIGNIFICANT CHANGE UP (ref 11.5–15.5)
MAGNESIUM SERPL-MCNC: 1.7 MG/DL — SIGNIFICANT CHANGE UP (ref 1.6–2.6)
MCHC RBC-ENTMCNC: 30 PG — SIGNIFICANT CHANGE UP (ref 27–34)
MCHC RBC-ENTMCNC: 32.2 GM/DL — SIGNIFICANT CHANGE UP (ref 32–36)
MCV RBC AUTO: 93.2 FL — SIGNIFICANT CHANGE UP (ref 80–100)
NRBC # BLD: 0 /100 WBCS — SIGNIFICANT CHANGE UP (ref 0–0)
PHOSPHATE SERPL-MCNC: 2.8 MG/DL — SIGNIFICANT CHANGE UP (ref 2.5–4.5)
PLATELET # BLD AUTO: 352 K/UL — SIGNIFICANT CHANGE UP (ref 150–400)
POTASSIUM SERPL-MCNC: 3.1 MMOL/L — LOW (ref 3.5–5.3)
POTASSIUM SERPL-SCNC: 3.1 MMOL/L — LOW (ref 3.5–5.3)
RBC # BLD: 4.13 M/UL — SIGNIFICANT CHANGE UP (ref 3.8–5.2)
RBC # FLD: 13.3 % — SIGNIFICANT CHANGE UP (ref 10.3–14.5)
SODIUM SERPL-SCNC: 139 MMOL/L — SIGNIFICANT CHANGE UP (ref 135–145)
WBC # BLD: 17.18 K/UL — HIGH (ref 3.8–10.5)
WBC # FLD AUTO: 17.18 K/UL — HIGH (ref 3.8–10.5)

## 2022-07-12 PROCEDURE — 99232 SBSQ HOSP IP/OBS MODERATE 35: CPT

## 2022-07-12 PROCEDURE — 99024 POSTOP FOLLOW-UP VISIT: CPT

## 2022-07-12 PROCEDURE — 99233 SBSQ HOSP IP/OBS HIGH 50: CPT

## 2022-07-12 RX ORDER — POTASSIUM CHLORIDE 20 MEQ
40 PACKET (EA) ORAL EVERY 4 HOURS
Refills: 0 | Status: COMPLETED | OUTPATIENT
Start: 2022-07-12 | End: 2022-07-12

## 2022-07-12 RX ORDER — MAGNESIUM SULFATE 500 MG/ML
2 VIAL (ML) INJECTION ONCE
Refills: 0 | Status: COMPLETED | OUTPATIENT
Start: 2022-07-12 | End: 2022-07-12

## 2022-07-12 RX ADMIN — PIPERACILLIN AND TAZOBACTAM 25 GRAM(S): 4; .5 INJECTION, POWDER, LYOPHILIZED, FOR SOLUTION INTRAVENOUS at 20:04

## 2022-07-12 RX ADMIN — Medication 15 MILLIGRAM(S): at 03:20

## 2022-07-12 RX ADMIN — ATORVASTATIN CALCIUM 40 MILLIGRAM(S): 80 TABLET, FILM COATED ORAL at 21:26

## 2022-07-12 RX ADMIN — Medication 15 MILLIGRAM(S): at 13:41

## 2022-07-12 RX ADMIN — Medication 15 MILLIGRAM(S): at 02:45

## 2022-07-12 RX ADMIN — HEPARIN SODIUM 5000 UNIT(S): 5000 INJECTION INTRAVENOUS; SUBCUTANEOUS at 05:06

## 2022-07-12 RX ADMIN — Medication 1000 MILLIGRAM(S): at 13:50

## 2022-07-12 RX ADMIN — PIPERACILLIN AND TAZOBACTAM 25 GRAM(S): 4; .5 INJECTION, POWDER, LYOPHILIZED, FOR SOLUTION INTRAVENOUS at 03:31

## 2022-07-12 RX ADMIN — Medication 25 GRAM(S): at 13:41

## 2022-07-12 RX ADMIN — Medication 40 MILLIEQUIVALENT(S): at 13:42

## 2022-07-12 RX ADMIN — Medication 400 MILLIGRAM(S): at 05:06

## 2022-07-12 RX ADMIN — Medication 1000 MILLIGRAM(S): at 05:40

## 2022-07-12 RX ADMIN — HEPARIN SODIUM 5000 UNIT(S): 5000 INJECTION INTRAVENOUS; SUBCUTANEOUS at 13:41

## 2022-07-12 RX ADMIN — Medication 15 MILLIGRAM(S): at 13:50

## 2022-07-12 RX ADMIN — PIPERACILLIN AND TAZOBACTAM 25 GRAM(S): 4; .5 INJECTION, POWDER, LYOPHILIZED, FOR SOLUTION INTRAVENOUS at 11:35

## 2022-07-12 RX ADMIN — Medication 15 MILLIGRAM(S): at 08:00

## 2022-07-12 RX ADMIN — HEPARIN SODIUM 5000 UNIT(S): 5000 INJECTION INTRAVENOUS; SUBCUTANEOUS at 21:26

## 2022-07-12 RX ADMIN — Medication 40 MILLIEQUIVALENT(S): at 18:57

## 2022-07-12 RX ADMIN — Medication 100 MILLIGRAM(S): at 21:25

## 2022-07-12 RX ADMIN — Medication 400 MILLIGRAM(S): at 11:34

## 2022-07-12 NOTE — PROGRESS NOTE ADULT - TIME BILLING
pt seen and examine today see above plan .  acute diverticulitis - on clear liquid diet , iv abx Zosyn , npo mid night for or in am by surgery team . continue surgery care , medical and cardiac clearance  see above.
Note written by attending, see above.  Time spent: 40min. More than 50% of the visit was spent counseling the patient on medical condition - recurrent sigmoid diverticulitis, for sigmoid colectomy.
pt seen and examine today see above plan .
Note written by attending, see above.  Time spent: 40min. More than 50% of the visit was spent counseling the patient on medical condition - recurrent sigmoid diverticulitis, for sigmoid colectomy, reactive leukocytosis.

## 2022-07-12 NOTE — PROGRESS NOTE ADULT - SUBJECTIVE AND OBJECTIVE BOX
Patient is a 71y old  Female who presents with a chief complaint of abd pain.       INTERVAL HPI/OVERNIGHT EVENTS: Pt states has abd pain near incisions. Denies nausea, vomiting, fever, chills, CP, SOB. Had loose stool today.    MEDICATIONS  (STANDING):  atorvastatin 40 milliGRAM(s) Oral at bedtime  heparin   Injectable 5000 Unit(s) SubCutaneous every 8 hours  piperacillin/tazobactam IVPB.. 3.375 Gram(s) IV Intermittent every 8 hours  traZODone 100 milliGRAM(s) Oral at bedtime    MEDICATIONS  (PRN):  HYDROmorphone  Injectable 0.2 milliGRAM(s) IV Push every 2 hours PRN Severe Pain (7 - 10)  ondansetron Injectable 4 milliGRAM(s) IV Push every 6 hours PRN Nausea  oxyCODONE    IR 5 milliGRAM(s) Oral every 4 hours PRN Moderate Pain (4 - 6)        Allergies    No Known Allergies    Intolerances        REVIEW OF SYSTEMS:  CONSTITUTIONAL: No fever or chills  HEENT:  No headache, no sore throat  RESPIRATORY: No cough, wheezing, or shortness of breath  CARDIOVASCULAR: No chest pain, palpitations  GASTROINTESTINAL: abd pain at incisions, denies nausea, vomiting, or diarrhea  GENITOURINARY: No dysuria, frequency, or hematuria  NEUROLOGICAL: no focal weakness or dizziness  MUSCULOSKELETAL: no myalgias     Vital Signs Last 24 Hrs  T(C): 36.8 (12 Jul 2022 09:24), Max: 37.2 (11 Jul 2022 13:00)  T(F): 98.2 (12 Jul 2022 09:24), Max: 99 (11 Jul 2022 13:00)  HR: 86 (12 Jul 2022 09:24) (64 - 90)  BP: 154/76 (12 Jul 2022 09:24) (113/38 - 154/76)  BP(mean): --  RR: 18 (12 Jul 2022 09:24) (10 - 20)  SpO2: 98% (12 Jul 2022 09:24) (94% - 98%)    Parameters below as of 12 Jul 2022 09:24  Patient On (Oxygen Delivery Method): nasal cannula      PHYSICAL EXAM:  GENERAL: NAD  HEENT:  anicteric, moist mucous membranes  CHEST/LUNG:  CTA b/l, no rales, wheezes, or rhonchi  HEART:  RRR, S1, S2  ABDOMEN:  laparoscopic incisions healing; BS+, soft, mild tenderness without guarding, nondistended  EXTREMITIES: no cyanosis, or calf tenderness  NERVOUS SYSTEM: answers questions and follows commands appropriately    LABS:                                   12.4   17.18 )-----------( 352      ( 12 Jul 2022 07:35 )             38.5     CBC Full  -  ( 12 Jul 2022 07:35 )  WBC Count : 17.18 K/uL  Hemoglobin : 12.4 g/dL  Hematocrit : 38.5 %  Platelet Count - Automated : 352 K/uL  Mean Cell Volume : 93.2 fl  Mean Cell Hemoglobin : 30.0 pg  Mean Cell Hemoglobin Concentration : 32.2 gm/dL  Auto Neutrophil # : x  Auto Lymphocyte # : x  Auto Monocyte # : x  Auto Eosinophil # : x  Auto Basophil # : x  Auto Neutrophil % : x  Auto Lymphocyte % : x  Auto Monocyte % : x  Auto Eosinophil % : x  Auto Basophil % : x    07-12    139  |  105  |  6<L>  ----------------------------<  211<H>  3.1<L>   |  24  |  0.78    Ca    8.8      12 Jul 2022 07:35  Phos  2.8     07-12  Mg     1.7     07-12    TPro  6.7  /  Alb  3.2<L>  /  TBili  0.4  /  DBili  x   /  AST  15  /  ALT  19  /  AlkPhos  54  07-11            Culture - Urine (collected 07-08-22 @ 11:45)  Source: Clean Catch Clean Catch (Midstream)  Final Report (07-10-22 @ 07:28):    <10,000 CFU/mL Normal Urogenital Inocencia        RADIOLOGY & ADDITIONAL TESTS:    Personally reviewed.     Consultant(s) Notes Reviewed:  [x] YES  [ ] NO     Patient is a 71y old  Female who presents with a chief complaint of abd pain.        INTERVAL HPI/OVERNIGHT EVENTS: Pt states has abd pain near incisions. Denies nausea, vomiting, fever, chills, CP, SOB. Had loose stool today.    MEDICATIONS  (STANDING):  atorvastatin 40 milliGRAM(s) Oral at bedtime  heparin   Injectable 5000 Unit(s) SubCutaneous every 8 hours  piperacillin/tazobactam IVPB.. 3.375 Gram(s) IV Intermittent every 8 hours  traZODone 100 milliGRAM(s) Oral at bedtime    MEDICATIONS  (PRN):  HYDROmorphone  Injectable 0.2 milliGRAM(s) IV Push every 2 hours PRN Severe Pain (7 - 10)  ondansetron Injectable 4 milliGRAM(s) IV Push every 6 hours PRN Nausea  oxyCODONE    IR 5 milliGRAM(s) Oral every 4 hours PRN Moderate Pain (4 - 6)        Allergies    No Known Allergies    Intolerances        REVIEW OF SYSTEMS:  CONSTITUTIONAL: No fever or chills  HEENT:  No headache, no sore throat  RESPIRATORY: No cough, wheezing, or shortness of breath  CARDIOVASCULAR: No chest pain, palpitations  GASTROINTESTINAL: abd pain at incisions, denies nausea, vomiting, or diarrhea  GENITOURINARY: No dysuria, frequency, or hematuria  NEUROLOGICAL: no focal weakness or dizziness  MUSCULOSKELETAL: no myalgias     Vital Signs Last 24 Hrs  T(C): 36.8 (12 Jul 2022 09:24), Max: 37.2 (11 Jul 2022 13:00)  T(F): 98.2 (12 Jul 2022 09:24), Max: 99 (11 Jul 2022 13:00)  HR: 86 (12 Jul 2022 09:24) (64 - 90)  BP: 154/76 (12 Jul 2022 09:24) (113/38 - 154/76)  BP(mean): --  RR: 18 (12 Jul 2022 09:24) (10 - 20)  SpO2: 98% (12 Jul 2022 09:24) (94% - 98%)    Parameters below as of 12 Jul 2022 09:24  Patient On (Oxygen Delivery Method): nasal cannula      PHYSICAL EXAM:  GENERAL: NAD  HEENT:  anicteric, moist mucous membranes  CHEST/LUNG:  CTA b/l, no rales, wheezes, or rhonchi  HEART:  RRR, S1, S2  ABDOMEN:  laparoscopic incisions healing; BS+, soft, mild tenderness without guarding, nondistended  EXTREMITIES: no cyanosis, or calf tenderness  NERVOUS SYSTEM: answers questions and follows commands appropriately    LABS:                                   12.4   17.18 )-----------( 352      ( 12 Jul 2022 07:35 )             38.5     CBC Full  -  ( 12 Jul 2022 07:35 )  WBC Count : 17.18 K/uL  Hemoglobin : 12.4 g/dL  Hematocrit : 38.5 %  Platelet Count - Automated : 352 K/uL  Mean Cell Volume : 93.2 fl  Mean Cell Hemoglobin : 30.0 pg  Mean Cell Hemoglobin Concentration : 32.2 gm/dL  Auto Neutrophil # : x  Auto Lymphocyte # : x  Auto Monocyte # : x  Auto Eosinophil # : x  Auto Basophil # : x  Auto Neutrophil % : x  Auto Lymphocyte % : x  Auto Monocyte % : x  Auto Eosinophil % : x  Auto Basophil % : x    07-12    139  |  105  |  6<L>  ----------------------------<  211<H>  3.1<L>   |  24  |  0.78    Ca    8.8      12 Jul 2022 07:35  Phos  2.8     07-12  Mg     1.7     07-12    TPro  6.7  /  Alb  3.2<L>  /  TBili  0.4  /  DBili  x   /  AST  15  /  ALT  19  /  AlkPhos  54  07-11            Culture - Urine (collected 07-08-22 @ 11:45)  Source: Clean Catch Clean Catch (Midstream)  Final Report (07-10-22 @ 07:28):    <10,000 CFU/mL Normal Urogenital Inocencia        RADIOLOGY & ADDITIONAL TESTS:    Personally reviewed.     Consultant(s) Notes Reviewed:  [x] YES  [ ] NO

## 2022-07-12 NOTE — PROGRESS NOTE ADULT - ASSESSMENT
70 y/o female with PMHx of HLD, COPD, and multiple bouts of diverticulitis, presenting with abdominal pain admitted for recurrent acute diverticulitis, now s/p sigmoidectomy on Monday 7/11.      Problem/Recommendation - 1:  ·  Problem: Diverticulitis. -  Pt presents with abdominal pain   - CT A/P: Focal mural thickening at the proximal sigmoid colon with adjacent stranding and diverticulosis, compatible with diverticulitis. No associated abscess is identified.   - s/p sigmoidectomy on Monday 7/11 with Dr. Olivo   - now on clear liquid diet, advance per surgery recs  - had bowel prep before surgery  - c/w zosyn  - Monitor fever and leukocytosis --- had resolved, now WBC likely spiked up due to stress response to surgery  - cardio ([facility]), recs appreciated     Problem/Recommendation - 2:  ·  Problem: HLD (hyperlipidemia).   ·  Recommendation: Chronic  Continue statin.       Problem/Recommendation - 3  hx  mood dis / anxiety - c/w Trazadone 100 mg po daily      Problem/Recommendation - 4  ·  Problem: Need for prophylactic measure.   ·  Recommendation: DVT PPX: HSQ    - SCDs

## 2022-07-12 NOTE — PROGRESS NOTE ADULT - SUBJECTIVE AND OBJECTIVE BOX
SURGERY PA NOTE ON BEHALF OF DR. ROLLE:    S: Patient seen and examined at bedside.  No acute events overnight.  Patient reports abdominal pain in B/l lower quadrants.  Denies fevers, chills, N/V, flatus, BMs, chest pain, SOB, calf pain.      MEDICATIONS:  acetaminophen   IVPB .. 1000 milliGRAM(s) IV Intermittent every 6 hours  atorvastatin 40 milliGRAM(s) Oral at bedtime  heparin   Injectable 5000 Unit(s) SubCutaneous every 8 hours  HYDROmorphone  Injectable 0.2 milliGRAM(s) IV Push every 2 hours PRN  ketorolac   Injectable 15 milliGRAM(s) IV Push every 6 hours  ondansetron Injectable 4 milliGRAM(s) IV Push every 6 hours PRN  oxyCODONE    IR 5 milliGRAM(s) Oral every 4 hours PRN  piperacillin/tazobactam IVPB.. 3.375 Gram(s) IV Intermittent every 8 hours  traZODone 100 milliGRAM(s) Oral at bedtime      O:  Vital Signs Last 24 Hrs  T(C): 36.3 (11 Jul 2022 20:04), Max: 37.2 (11 Jul 2022 13:00)  T(F): 97.4 (11 Jul 2022 20:04), Max: 99 (11 Jul 2022 13:00)  HR: 90 (12 Jul 2022 05:09) (64 - 90)  BP: 142/72 (12 Jul 2022 05:09) (113/38 - 193/93)  BP(mean): --  RR: 20 (12 Jul 2022 05:09) (10 - 20)  SpO2: 98% (12 Jul 2022 05:09) (94% - 98%)    Parameters below as of 12 Jul 2022 05:09  Patient On (Oxygen Delivery Method): nasal cannula  O2 Flow (L/min): 2      I&O SUMMARY:    07-11-22 @ 07:01  -  07-12-22 @ 06:47  --------------------------------------------------------  IN: 250 mL / OUT: 1875 mL / NET: -1625 mL        PHYSICAL EXAM:  Lungs: CTA bilat without W/R/R  Card: S1S2  Abd: Soft,, ND.  +BS x 4.  No rebound/guarding.  Incisions clean, dry, intact   Ext: Calves soft, NT, without edema bilat    LABS:                        12.3   5.30  )-----------( 378      ( 11 Jul 2022 08:22 )             36.6     07-11    143  |  113<H>  |  4<L>  ----------------------------<  107<H>  3.2<L>   |  23  |  0.50    Ca    9.1      11 Jul 2022 08:22    TPro  6.7  /  Alb  3.2<L>  /  TBili  0.4  /  DBili  x   /  AST  15  /  ALT  19  /  AlkPhos  54  07-11      A:  71 year old female s/p lap sigmoidcolectomy POD #1 progressing in recovery     P:  - D/c zuniga catheter  - ERAS protocol  - F/u AM labs  - Ween O2  - OOB/Ambulate  - Encouraged IS use  - c/w clears diet  - Pain control  - HSQ/SCDs for DVT ppx  - Will discuss above with Dr. Rolle

## 2022-07-12 NOTE — PROGRESS NOTE ADULT - SUBJECTIVE AND OBJECTIVE BOX
[facility] -- Wu Moreno, Chay Concepcion, Ameya Garay Savella, [facility]  Office # [phone]    Follow Up:    diverticulitis s/p sigmoid colectomy  Subjective/Observations:   Patient seen and examined. She continues to have abdominal tenderness. No complaints of chest pain or SOB, is wearing 2 liters nasal cannula.      REVIEW OF SYSTEMS: All other review of systems is negative unless indicated above  PAST MEDICAL & SURGICAL HISTORY:  Sigmoid diverticulitis  HLD (hyperlipidemia)  COPD (chronic obstructive pulmonary disease)    MEDICATIONS  (STANDING):  acetaminophen   IVPB .. 1000 milliGRAM(s) IV Intermittent every 6 hours  atorvastatin 40 milliGRAM(s) Oral at bedtime  heparin   Injectable 5000 Unit(s) SubCutaneous every 8 hours  ketorolac   Injectable 15 milliGRAM(s) IV Push every 6 hours  magnesium sulfate  IVPB 2 Gram(s) IV Intermittent once  piperacillin/tazobactam IVPB.. 3.375 Gram(s) IV Intermittent every 8 hours  potassium chloride    Tablet ER 40 milliEquivalent(s) Oral every 4 hours  traZODone 100 milliGRAM(s) Oral at bedtime    MEDICATIONS  (PRN):  HYDROmorphone  Injectable 0.2 milliGRAM(s) IV Push every 2 hours PRN Severe Pain (7 - 10)  ondansetron Injectable 4 milliGRAM(s) IV Push every 6 hours PRN Nausea  oxyCODONE    IR 5 milliGRAM(s) Oral every 4 hours PRN Moderate Pain (4 - 6)    Allergies    No Known Allergies    Intolerances      Vital Signs Last 24 Hrs  T(C): 36.8 (12 Jul 2022 09:24), Max: 37.2 (11 Jul 2022 13:00)  T(F): 98.2 (12 Jul 2022 09:24), Max: 99 (11 Jul 2022 13:00)  HR: 86 (12 Jul 2022 09:24) (64 - 90)  BP: 154/76 (12 Jul 2022 09:24) (113/38 - 154/76)  BP(mean): --  RR: 18 (12 Jul 2022 09:24) (10 - 20)  SpO2: 98% (12 Jul 2022 09:24) (94% - 98%)    Parameters below as of 12 Jul 2022 09:24  Patient On (Oxygen Delivery Method): nasal cannula      I&O's Summary    11 Jul 2022 07:01  -  12 Jul 2022 07:00  --------------------------------------------------------  IN: 250 mL / OUT: 1875 mL / NET: -1625 mL      Weight (kg): 65.8 (07-11 @ 13:04)  PHYSICAL EXAM:  TELE: sinus rhythm, sinus jayden 50s  Constitutional: NAD, awake and alert, well-developed  HEENT: Moist Mucous Membranes, Anicteric  Pulmonary: Non-labored, breath sounds are clear bilaterally, No wheezing, rales or rhonchi  Cardiovascular: Regular, S1 and S2, No murmurs, rubs, gallops or clicks  Gastrointestinal: Bowel Sounds present, soft, nontender.   Lymph: No peripheral edema. No lymphadenopathy.  Skin: No visible rashes or ulcers.  Psych:  Mood & affect appropriate  LABS: All Labs Reviewed:                        12.4   17.18 )-----------( 352      ( 12 Jul 2022 07:35 )             38.5                         12.3   5.30  )-----------( 378      ( 11 Jul 2022 08:22 )             36.6                         11.8   6.14  )-----------( 363      ( 10 Jul 2022 06:05 )             36.3     12 Jul 2022 07:35    139    |  105    |  6      ----------------------------<  211    3.1     |  24     |  0.78   11 Jul 2022 08:22    143    |  113    |  4      ----------------------------<  107    3.2     |  23     |  0.50   10 Jul 2022 06:05    141    |  111    |  6      ----------------------------<  98     3.5     |  20     |  0.53     Ca    8.8        12 Jul 2022 07:35  Ca    9.1        11 Jul 2022 08:22  Ca    8.8        10 Jul 2022 06:05  Phos  2.8       12 Jul 2022 07:35  Mg     1.7       12 Jul 2022 07:35    TPro  6.7    /  Alb  3.2    /  TBili  0.4    /  DBili  x      /  AST  15     /  ALT  19     /  AlkPhos  54     11 Jul 2022 08:22

## 2022-07-12 NOTE — PROGRESS NOTE ADULT - NS ATTEND AMEND GEN_ALL_CORE FT
-there is no evidence of acute ischemia.  -there is no evidence of significant arrhythmia.  -there is no evidence for meaningful  volume overload.  -no cv complications s/p colon resection

## 2022-07-12 NOTE — PROGRESS NOTE ADULT - NS ATTEND AMEND GEN_ALL_CORE FT
POD 1 s/p Lap Sigmoid/Left colectomy with mobilization of splenic flexure and primary colorectal anastomosis.  Doing well.  No new complaints.  Brannon catheter D/C'd this morning.  Morning blood work was still pending at time of visit this morning.  Since resulted BC 17K, likely reactive from surgery (Sigmoid Diverticulitis)  Potassium low at 3.1  To be replaced.  Encourage OOB and ambulation, Incentive Spirometry.  DVT and GI Prophylaxis.  Clear diet and ERAS protocols.  Advance diet as tolerated.  Monitor for return of bowel function.

## 2022-07-12 NOTE — PROGRESS NOTE ADULT - ASSESSMENT
70 y/o female with PMHx of HLD & COPD presenting with abdominal pain, found to have active diverticulitis on CT.s/p sigmoid colectomy on 7/11.    Preop, HLD  - Denies chest pain, palpitation, SOB orthopnea, PND, HUGHES and edema  - EKG demonstrates NSR. No acute ischemic changes noted.   - Patient euvolemic on examination with no overt signs of heart failure or cardiac ischemia.   - No severe valvular abnormalities noted on examination  - No evidence of significant arrhythmia   - recommend outpatient ECHO   -losartan 25mg po qd initiated for hypertension  - Monitor and replete lytes, keep K>4, Mg>2.  - Will continue to follow.    Jessica Morataya, DARVIN   [phone]

## 2022-07-12 NOTE — PROGRESS NOTE ADULT - SUBJECTIVE AND OBJECTIVE BOX
The patient was interviewed and evaluated.    71y Female    T(C): 36.8 (07-12-22 @ 09:24), Max: 37.2 (07-11-22 @ 13:00)  HR: 86 (07-12-22 @ 09:24) (64 - 90)  BP: 154/76 (07-12-22 @ 09:24) (113/38 - 154/76)  RR: 18 (07-12-22 @ 09:24) (10 - 20)  SpO2: 98% (07-12-22 @ 09:24) (94% - 98%)  Wt(kg): --    No Nausea/vomiting, recall, sore throat or headache.    No anesthesia related complaints or sequelae.

## 2022-07-13 ENCOUNTER — APPOINTMENT (OUTPATIENT)
Dept: GASTROENTEROLOGY | Facility: CLINIC | Age: 72
End: 2022-07-13

## 2022-07-13 LAB
ANION GAP SERPL CALC-SCNC: 9 MMOL/L — SIGNIFICANT CHANGE UP (ref 5–17)
BUN SERPL-MCNC: 6 MG/DL — LOW (ref 7–23)
CALCIUM SERPL-MCNC: 8.7 MG/DL — SIGNIFICANT CHANGE UP (ref 8.5–10.1)
CHLORIDE SERPL-SCNC: 109 MMOL/L — HIGH (ref 96–108)
CO2 SERPL-SCNC: 21 MMOL/L — LOW (ref 22–31)
CREAT SERPL-MCNC: 0.8 MG/DL — SIGNIFICANT CHANGE UP (ref 0.5–1.3)
EGFR: 78 ML/MIN/1.73M2 — SIGNIFICANT CHANGE UP
GLUCOSE SERPL-MCNC: 273 MG/DL — HIGH (ref 70–99)
HCT VFR BLD CALC: 35.7 % — SIGNIFICANT CHANGE UP (ref 34.5–45)
HGB BLD-MCNC: 11.8 G/DL — SIGNIFICANT CHANGE UP (ref 11.5–15.5)
MAGNESIUM SERPL-MCNC: 2.1 MG/DL — SIGNIFICANT CHANGE UP (ref 1.6–2.6)
MCHC RBC-ENTMCNC: 30.2 PG — SIGNIFICANT CHANGE UP (ref 27–34)
MCHC RBC-ENTMCNC: 33.1 GM/DL — SIGNIFICANT CHANGE UP (ref 32–36)
MCV RBC AUTO: 91.3 FL — SIGNIFICANT CHANGE UP (ref 80–100)
NRBC # BLD: 0 /100 WBCS — SIGNIFICANT CHANGE UP (ref 0–0)
PHOSPHATE SERPL-MCNC: 2 MG/DL — LOW (ref 2.5–4.5)
PLATELET # BLD AUTO: 384 K/UL — SIGNIFICANT CHANGE UP (ref 150–400)
POTASSIUM SERPL-MCNC: 3.2 MMOL/L — LOW (ref 3.5–5.3)
POTASSIUM SERPL-SCNC: 3.2 MMOL/L — LOW (ref 3.5–5.3)
RBC # BLD: 3.91 M/UL — SIGNIFICANT CHANGE UP (ref 3.8–5.2)
RBC # FLD: 13.8 % — SIGNIFICANT CHANGE UP (ref 10.3–14.5)
SODIUM SERPL-SCNC: 139 MMOL/L — SIGNIFICANT CHANGE UP (ref 135–145)
WBC # BLD: 11.77 K/UL — HIGH (ref 3.8–10.5)
WBC # FLD AUTO: 11.77 K/UL — HIGH (ref 3.8–10.5)

## 2022-07-13 PROCEDURE — 99024 POSTOP FOLLOW-UP VISIT: CPT

## 2022-07-13 PROCEDURE — 99232 SBSQ HOSP IP/OBS MODERATE 35: CPT

## 2022-07-13 RX ORDER — POTASSIUM CHLORIDE 20 MEQ
40 PACKET (EA) ORAL EVERY 4 HOURS
Refills: 0 | Status: COMPLETED | OUTPATIENT
Start: 2022-07-13 | End: 2022-07-13

## 2022-07-13 RX ADMIN — PIPERACILLIN AND TAZOBACTAM 25 GRAM(S): 4; .5 INJECTION, POWDER, LYOPHILIZED, FOR SOLUTION INTRAVENOUS at 20:40

## 2022-07-13 RX ADMIN — PIPERACILLIN AND TAZOBACTAM 25 GRAM(S): 4; .5 INJECTION, POWDER, LYOPHILIZED, FOR SOLUTION INTRAVENOUS at 04:09

## 2022-07-13 RX ADMIN — HEPARIN SODIUM 5000 UNIT(S): 5000 INJECTION INTRAVENOUS; SUBCUTANEOUS at 05:44

## 2022-07-13 RX ADMIN — HEPARIN SODIUM 5000 UNIT(S): 5000 INJECTION INTRAVENOUS; SUBCUTANEOUS at 13:47

## 2022-07-13 RX ADMIN — Medication 40 MILLIEQUIVALENT(S): at 18:07

## 2022-07-13 RX ADMIN — ATORVASTATIN CALCIUM 40 MILLIGRAM(S): 80 TABLET, FILM COATED ORAL at 21:47

## 2022-07-13 RX ADMIN — Medication 100 MILLIGRAM(S): at 21:48

## 2022-07-13 RX ADMIN — Medication 40 MILLIEQUIVALENT(S): at 13:46

## 2022-07-13 RX ADMIN — PIPERACILLIN AND TAZOBACTAM 25 GRAM(S): 4; .5 INJECTION, POWDER, LYOPHILIZED, FOR SOLUTION INTRAVENOUS at 12:40

## 2022-07-13 RX ADMIN — HEPARIN SODIUM 5000 UNIT(S): 5000 INJECTION INTRAVENOUS; SUBCUTANEOUS at 21:48

## 2022-07-13 NOTE — PROGRESS NOTE ADULT - NS ATTEND AMEND GEN_ALL_CORE FT
Patient seen and examined this morning.  Resting comfortably in bed in no acute distress.  Patient reports having been out of bed and ambulating.  Describes a dark loose stool overnight.  She has been tolerating clear liquid diet and denies nausea, vomiting or abdominal discomfort.    She remains hemodynamically stable.  Surgical incisions remain well approximated.  No signs of infection.    We will slowly advance diet as tolerated in preparation for discharge home by the end of the week.    Continue IV and oral analgesics as needed   encourage out of bed and ambulation, utilization of incentive spirometry.  We will continue DVT and GI prophylaxis Patient seen and examined this morning.  Resting comfortably in bed in no acute distress.  Patient reports having been out of bed and ambulating.  Describes a dark loose stool overnight.  She has been tolerating clear liquid diet and denies nausea, vomiting or abdominal discomfort.    She remains hemodynamically stable.  Surgical incisions remain well approximated.  No signs of infection.  Mild hypokalemia.  Oral potassium replacements have been provided    We will slowly advance diet as tolerated in preparation for discharge home by the end of the week.    Continue IV and oral analgesics as needed   encourage out of bed and ambulation, utilization of incentive spirometry.  We will continue DVT and GI prophylaxis

## 2022-07-13 NOTE — PROGRESS NOTE ADULT - ASSESSMENT
70 y/o female with PMHx of HLD, COPD, and multiple bouts of diverticulitis, presenting with abdominal pain admitted for recurrent acute diverticulitis, now s/p sigmoidectomy on Monday 7/11.      Problem/Recommendation - 1:  ·  Problem: Diverticulitis. -  Pt presents with abdominal pain   - CT A/P: Focal mural thickening at the proximal sigmoid colon with adjacent stranding and diverticulosis, compatible with diverticulitis. No associated abscess is identified.   - s/p sigmoidectomy on Monday 7/11 with Dr. Olivo   - now on clear liquid diet, advance per surgery recs  - had bowel prep before surgery  - c/w zosyn.  Analgesia PRN.   - Monitor fever and leukocytosis --- had resolved, now WBC likely spiked up due to stress response to surgery  - cardio ([facility]), recs appreciated     Problem/Recommendation - 2:  ·  Problem: HLD (hyperlipidemia).   ·  Recommendation: Chronic  Continue statin.       Problem/Recommendation - 3  hx  mood dis / anxiety - c/w Trazadone 100 mg po QHS     Problem/Recommendation - 4  ·  Problem: Need for prophylactic measure.   ·  Recommendation: DVT PPX: HSQ    - SCDs

## 2022-07-13 NOTE — PROGRESS NOTE ADULT - SUBJECTIVE AND OBJECTIVE BOX
CHIEF COMPLAINT/INTERVAL HISTORY:  Pt. seen and evaluated for recurring acute diverticulitis.  Pt. is s/p lap sigmoid colectomy 2 days ago.  Reports having some abdominal soreness.  +loose BMs yesterday.  On clear liquids.      REVIEW OF SYSTEMS:  No fever, CP, or SOB.      Vital Signs Last 24 Hrs  T(C): 36.8 (13 Jul 2022 04:26), Max: 37.1 (12 Jul 2022 20:46)  T(F): 98.3 (13 Jul 2022 04:26), Max: 98.7 (12 Jul 2022 20:46)  HR: 92 (13 Jul 2022 04:26) (80 - 100)  BP: 152/82 (13 Jul 2022 04:26) (138/71 - 164/81)  BP(mean): --  RR: 18 (13 Jul 2022 04:26) (18 - 18)  SpO2: 95% (13 Jul 2022 04:26) (95% - 98%)    Parameters below as of 13 Jul 2022 04:26  Patient On (Oxygen Delivery Method): room air        PHYSICAL EXAM:  GENERAL: NAD  HEENT: EOMI, hearing normal, conjunctiva and sclera clear  Chest: CTA bilaterally, no wheezing  CV: S1S2, RRR,   GI: soft, +BS, ND, mild tenderness, no guarding, small laparoscopic incisions intact  Musculoskeletal: no edema  Psychiatric: affect nL, mood nL  Skin: warm and dry    LABS:                        12.4   17.18 )-----------( 352      ( 12 Jul 2022 07:35 )             38.5     07-12    139  |  105  |  6<L>  ----------------------------<  211<H>  3.1<L>   |  24  |  0.78    Ca    8.8      12 Jul 2022 07:35  Phos  2.8     07-12  Mg     1.7     07-12    TPro  6.7  /  Alb  3.2<L>  /  TBili  0.4  /  DBili  x   /  AST  15  /  ALT  19  /  AlkPhos  54  07-11

## 2022-07-13 NOTE — PROGRESS NOTE ADULT - ASSESSMENT
72 y/o female with PMHx of HLD & COPD presenting with abdominal pain, found to have active diverticulitis on CT.s/p sigmoid colectomy on 7/11.    Cardiac clearance, HLD  - EKG demonstrates NSR. No acute ischemic changes noted.   - Patient euvolemic on examination with no overt signs of heart failure or cardiac ischemia.   - No severe valvular abnormalities noted on examination  - No evidence of significant arrhythmia   - Recommend outpatient ECHO   - Continue Lipitor   - Can start losartan 25mg po for hypertension  - -160s     -  S/p Laparoscopic sigmoid colectomy  - Tolerated procedure well, cardiac status optimal post operatively  - Pain control  - Encourage Incentive Spirometry    - Monitor and replete lytes, keep K>4, Mg>2.  - Will continue to follow.    Laisha Olvera MS FNP, [facility]  Nurse Practitioner- Cardiology   Spectra #[phone]

## 2022-07-13 NOTE — PROGRESS NOTE ADULT - SUBJECTIVE AND OBJECTIVE BOX
POST OPERATIVE DAY #2  STATUS POST: Laparoscopic sigmoid colectomy    SUBJECTIVE: Patient seen and examined at bedside. Pt states that she feels well, c/o bloating after eating. Ambulating well and voiding. Tolerating CLD, admits to passing flatus but no BM. Denies abdominal pain, N/V.     Vital Signs Last 24 Hrs  T(C): 36.8 (13 Jul 2022 04:26), Max: 37.1 (12 Jul 2022 20:46)  T(F): 98.3 (13 Jul 2022 04:26), Max: 98.7 (12 Jul 2022 20:46)  HR: 92 (13 Jul 2022 04:26) (80 - 100)  BP: 152/82 (13 Jul 2022 04:26) (138/71 - 164/81)  BP(mean): --  RR: 18 (13 Jul 2022 04:26) (18 - 18)  SpO2: 95% (13 Jul 2022 04:26) (95% - 98%)    Parameters below as of 13 Jul 2022 04:26  Patient On (Oxygen Delivery Method): room air    PHYSICAL EXAM:  GENERAL: No acute distress, well-developed  HEAD:  Atraumatic, Normocephalic  ABDOMEN: Softly distended, appropriate josias-incisional ttp. Dressing over incision site C/D/I. Erythema noted at skin surrounding dressing.   NEUROLOGY: A&O x 3, no focal deficits    I&O's Summary    11 Jul 2022 07:01  -  12 Jul 2022 07:00  --------------------------------------------------------  IN: 250 mL / OUT: 1875 mL / NET: -1625 mL    12 Jul 2022 07:01  -  13 Jul 2022 05:34  --------------------------------------------------------  IN: 240 mL / OUT: 300 mL / NET: -60 mL      MEDICATIONS  (STANDING):  atorvastatin 40 milliGRAM(s) Oral at bedtime  heparin   Injectable 5000 Unit(s) SubCutaneous every 8 hours  piperacillin/tazobactam IVPB.. 3.375 Gram(s) IV Intermittent every 8 hours  traZODone 100 milliGRAM(s) Oral at bedtime    MEDICATIONS  (PRN):  HYDROmorphone  Injectable 0.2 milliGRAM(s) IV Push every 2 hours PRN Severe Pain (7 - 10)  ondansetron Injectable 4 milliGRAM(s) IV Push every 6 hours PRN Nausea  oxyCODONE    IR 5 milliGRAM(s) Oral every 4 hours PRN Moderate Pain (4 - 6)    LABS:                        12.4   17.18 )-----------( 352      ( 12 Jul 2022 07:35 )             38.5     07-12    139  |  105  |  6<L>  ----------------------------<  211<H>  3.1<L>   |  24  |  0.78    Ca    8.8      12 Jul 2022 07:35  Phos  2.8     07-12  Mg     1.7     07-12    TPro  6.7  /  Alb  3.2<L>  /  TBili  0.4  /  DBili  x   /  AST  15  /  ALT  19  /  AlkPhos  54  07-11    RADIOLOGY & ADDITIONAL STUDIES:     POST OPERATIVE DAY #2  STATUS POST: Laparoscopic sigmoid colectomy    SUBJECTIVE: Patient seen and examined at bedside. Pt expresses concern over having multiple loose, "darker" BMs yesterday and overnight, and states that she is afraid to eat more because of this. Pt also c/o abdominal soreness with movement. Pt is ambulating well. Denies N/V, lightheadedness, dizziness, chest pain, SOB.     Vital Signs Last 24 Hrs  T(C): 36.8 (13 Jul 2022 04:26), Max: 37.1 (12 Jul 2022 20:46)  T(F): 98.3 (13 Jul 2022 04:26), Max: 98.7 (12 Jul 2022 20:46)  HR: 92 (13 Jul 2022 04:26) (80 - 100)  BP: 152/82 (13 Jul 2022 04:26) (138/71 - 164/81)  BP(mean): --  RR: 18 (13 Jul 2022 04:26) (18 - 18)  SpO2: 95% (13 Jul 2022 04:26) (95% - 98%)    Parameters below as of 13 Jul 2022 04:26  Patient On (Oxygen Delivery Method): room air    PHYSICAL EXAM:  GENERAL: No acute distress, well-developed  HEAD:  Atraumatic, Normocephalic  ABDOMEN: Soft, non-distended, appropriate josias-incisional ttp. Dressings over trocar sites C/D/I.   NEUROLOGY: A&O x 3, no focal deficits    I&O's Summary    11 Jul 2022 07:01  -  12 Jul 2022 07:00  --------------------------------------------------------  IN: 250 mL / OUT: 1875 mL / NET: -1625 mL    12 Jul 2022 07:01  -  13 Jul 2022 05:34  --------------------------------------------------------  IN: 240 mL / OUT: 300 mL / NET: -60 mL      MEDICATIONS  (STANDING):  atorvastatin 40 milliGRAM(s) Oral at bedtime  heparin   Injectable 5000 Unit(s) SubCutaneous every 8 hours  piperacillin/tazobactam IVPB.. 3.375 Gram(s) IV Intermittent every 8 hours  traZODone 100 milliGRAM(s) Oral at bedtime    MEDICATIONS  (PRN):  HYDROmorphone  Injectable 0.2 milliGRAM(s) IV Push every 2 hours PRN Severe Pain (7 - 10)  ondansetron Injectable 4 milliGRAM(s) IV Push every 6 hours PRN Nausea  oxyCODONE    IR 5 milliGRAM(s) Oral every 4 hours PRN Moderate Pain (4 - 6)    LABS:                        12.4   17.18 )-----------( 352      ( 12 Jul 2022 07:35 )             38.5     07-12    139  |  105  |  6<L>  ----------------------------<  211<H>  3.1<L>   |  24  |  0.78    Ca    8.8      12 Jul 2022 07:35  Phos  2.8     07-12  Mg     1.7     07-12    TPro  6.7  /  Alb  3.2<L>  /  TBili  0.4  /  DBili  x   /  AST  15  /  ALT  19  /  AlkPhos  54  07-11    RADIOLOGY & ADDITIONAL STUDIES:

## 2022-07-13 NOTE — PROGRESS NOTE ADULT - NS ATTEND AMEND GEN_ALL_CORE FT
Chart review    Patient seen and examined    Agree with plan as outlined above    72 y/o female with PMHx of HLD & COPD presenting with abdominal pain, found to have active diverticulitis on CT.s/p sigmoid colectomy on 7/11.    Cardiac clearance, HLD  - EKG demonstrates NSR. No acute ischemic changes noted.   - Patient euvolemic on examination with no overt signs of heart failure or cardiac ischemia.   - No severe valvular abnormalities noted on examination  - No evidence of significant arrhythmia   - Recommend outpatient ECHO   - Continue Lipitor   - Can start losartan 25mg po for hypertension  - -160s     -  S/p Laparoscopic sigmoid colectomy  - Tolerated procedure well, cardiac status optimal post operatively  - Pain control  - Encourage Incentive Spirometry

## 2022-07-13 NOTE — PROGRESS NOTE ADULT - SUBJECTIVE AND OBJECTIVE BOX
[facility] -- Wu Moreno, Chay Concepcion, Ameya Garay Savella, [facility]: Office # [phone]    Follow Up: Diverticulitis s/p sigmoid colectomy. HLD    Subjective/Observations: Patient seen and examined. Patient awake, alert, resting in bed. No complaints of chest pain, dyspnea, palpitations or dizziness. No signs of orthopnea or PND. Tolerating room air. Continues to have some postop discomfort    REVIEW OF SYSTEMS: All other review of systems are negative unless indicated above    PAST MEDICAL & SURGICAL HISTORY:  Sigmoid diverticulitis    HLD (hyperlipidemia)    COPD (chronic obstructive pulmonary disease)      MEDICATIONS  (STANDING):  atorvastatin 40 milliGRAM(s) Oral at bedtime  heparin   Injectable 5000 Unit(s) SubCutaneous every 8 hours  piperacillin/tazobactam IVPB.. 3.375 Gram(s) IV Intermittent every 8 hours  traZODone 100 milliGRAM(s) Oral at bedtime    MEDICATIONS  (PRN):  HYDROmorphone  Injectable 0.2 milliGRAM(s) IV Push every 2 hours PRN Severe Pain (7 - 10)  ondansetron Injectable 4 milliGRAM(s) IV Push every 6 hours PRN Nausea  oxyCODONE    IR 5 milliGRAM(s) Oral every 4 hours PRN Moderate Pain (4 - 6)    Allergies  No Known Allergies    Vital Signs Last 24 Hrs  T(C): 36.8 (13 Jul 2022 04:26), Max: 37.1 (12 Jul 2022 20:46)  T(F): 98.3 (13 Jul 2022 04:26), Max: 98.7 (12 Jul 2022 20:46)  HR: 92 (13 Jul 2022 04:26) (80 - 100)  BP: 152/82 (13 Jul 2022 04:26) (138/71 - 164/81)  BP(mean): --  RR: 18 (13 Jul 2022 04:26) (18 - 18)  SpO2: 95% (13 Jul 2022 04:26) (95% - 98%)    Parameters below as of 13 Jul 2022 04:26  Patient On (Oxygen Delivery Method): room air      I&O's Summary    12 Jul 2022 07:01  -  13 Jul 2022 07:00  --------------------------------------------------------  IN: 240 mL / OUT: 300 mL / NET: -60 mL        TELE:   PHYSICAL EXAM:  Constitutional: NAD, awake and alert, Well developed   HEENT: Moist Mucous Membranes, Anicteric  Pulmonary: Non-labored, breath sounds are clear bilaterally, No wheezing, rales or rhonchi  Cardiovascular: Regular, S1 and S2, No murmurs, No rubs, gallops or clicks  Gastrointestinal:  soft, nontender, nondistended , + abdominal incision   Lymph: No peripheral edema. No lymphadenopathy.   Skin: No visible rashes or ulcers.  Psych:  Mood & affect appropriate      LABS: All Labs Reviewed:                        12.4 17.18 )-----------( 352      ( 12 Jul 2022 07:35 )             38.5                         12.3   5.30  )-----------( 378      ( 11 Jul 2022 08:22 )             36.6     12 Jul 2022 07:35    139    |  105    |  6      ----------------------------<  211    3.1     |  24     |  0.78   11 Jul 2022 08:22    143    |  113    |  4      ----------------------------<  107    3.2     |  23     |  0.50     Ca    8.8        12 Jul 2022 07:35  Ca    9.1        11 Jul 2022 08:22  Phos  2.8       12 Jul 2022 07:35  Mg     1.7       12 Jul 2022 07:35    TPro  6.7    /  Alb  3.2    /  TBili  0.4    /  DBili  x      /  AST  15     /  ALT  19     /  AlkPhos  54     11 Jul 2022 08:22   LIVER FUNCTIONS - ( 11 Jul 2022 08:22 )  Alb: 3.2 g/dL / Pro: 6.7 g/dL / ALK PHOS: 54 U/L / ALT: 19 U/L / AST: 15 U/L / GGT: x             12 Lead ECG:   Ventricular Rate 75 BPM    Atrial Rate 75 BPM    P-R Interval 136 ms    QRS Duration 92 ms    Q-T Interval 394 ms    QTC Calculation(Bazett) 439 ms    P Axis 67 degrees    R Axis 43 degrees    T Axis 65 degrees    Diagnosis Line Normal sinus rhythm  Normal ECG  No previous ECGs available  Confirmed by Jaquelin Marroquin ([id number]) on 7/9/2022 11:19:08 AM (07-08-22 @ 14:46)     [facility] -- Wu Moreno, Chay Concepcion, Ameya Garay Savella, [facility]: Office # [phone]    Follow Up: Diverticulitis s/p sigmoid colectomy. HLD    Subjective/Observations: Patient seen and examined. Patient awake, alert, resting in bed. No complaints of chest pain, dyspnea, palpitations or dizziness. No signs of orthopnea or PND. Tolerating room air. Continues to have some postop discomfort    REVIEW OF SYSTEMS: All other review of systems are negative unless indicated above    PAST MEDICAL & SURGICAL HISTORY:  Sigmoid diverticulitis    HLD (hyperlipidemia)    COPD (chronic obstructive pulmonary disease)      MEDICATIONS  (STANDING):  atorvastatin 40 milliGRAM(s) Oral at bedtime  heparin   Injectable 5000 Unit(s) SubCutaneous every 8 hours  piperacillin/tazobactam IVPB.. 3.375 Gram(s) IV Intermittent every 8 hours  traZODone 100 milliGRAM(s) Oral at bedtime    MEDICATIONS  (PRN):  HYDROmorphone  Injectable 0.2 milliGRAM(s) IV Push every 2 hours PRN Severe Pain (7 - 10)  ondansetron Injectable 4 milliGRAM(s) IV Push every 6 hours PRN Nausea  oxyCODONE    IR 5 milliGRAM(s) Oral every 4 hours PRN Moderate Pain (4 - 6)    Allergies  No Known Allergies    Vital Signs Last 24 Hrs  T(C): 36.8 (13 Jul 2022 04:26), Max: 37.1 (12 Jul 2022 20:46)  T(F): 98.3 (13 Jul 2022 04:26), Max: 98.7 (12 Jul 2022 20:46)  HR: 92 (13 Jul 2022 04:26) (80 - 100)  BP: 152/82 (13 Jul 2022 04:26) (138/71 - 164/81)  BP(mean): --  RR: 18 (13 Jul 2022 04:26) (18 - 18)  SpO2: 95% (13 Jul 2022 04:26) (95% - 98%)    Parameters below as of 13 Jul 2022 04:26  Patient On (Oxygen Delivery Method): room air      I&O's Summary    12 Jul 2022 07:01  -  13 Jul 2022 07:00  --------------------------------------------------------  IN: 240 mL / OUT: 300 mL / NET: -60 mL        TELE: Not on telemetry   PHYSICAL EXAM:  Constitutional: NAD, awake and alert, Well developed   HEENT: Moist Mucous Membranes, Anicteric  Pulmonary: Non-labored, breath sounds are clear bilaterally, No wheezing, rales or rhonchi  Cardiovascular: Regular, S1 and S2, No murmurs, No rubs, gallops or clicks  Gastrointestinal:  soft, nontender, nondistended , + abdominal incision   Lymph: No peripheral edema. No lymphadenopathy.   Skin: No visible rashes or ulcers.  Psych:  Mood & affect appropriate      LABS: All Labs Reviewed:                        12.4 17.18 )-----------( 352      ( 12 Jul 2022 07:35 )             38.5                         12.3   5.30  )-----------( 378      ( 11 Jul 2022 08:22 )             36.6     12 Jul 2022 07:35    139    |  105    |  6      ----------------------------<  211    3.1     |  24     |  0.78   11 Jul 2022 08:22    143    |  113    |  4      ----------------------------<  107    3.2     |  23     |  0.50     Ca    8.8        12 Jul 2022 07:35  Ca    9.1        11 Jul 2022 08:22  Phos  2.8       12 Jul 2022 07:35  Mg     1.7       12 Jul 2022 07:35    TPro  6.7    /  Alb  3.2    /  TBili  0.4    /  DBili  x      /  AST  15     /  ALT  19     /  AlkPhos  54     11 Jul 2022 08:22   LIVER FUNCTIONS - ( 11 Jul 2022 08:22 )  Alb: 3.2 g/dL / Pro: 6.7 g/dL / ALK PHOS: 54 U/L / ALT: 19 U/L / AST: 15 U/L / GGT: x             12 Lead ECG:   Ventricular Rate 75 BPM    Atrial Rate 75 BPM    P-R Interval 136 ms    QRS Duration 92 ms    Q-T Interval 394 ms    QTC Calculation(Bazett) 439 ms    P Axis 67 degrees    R Axis 43 degrees    T Axis 65 degrees    Diagnosis Line Normal sinus rhythm  Normal ECG  No previous ECGs available  Confirmed by Jaquelin Marroquin ([id number]) on 7/9/2022 11:19:08 AM (07-08-22 @ 14:46)

## 2022-07-13 NOTE — PROGRESS NOTE ADULT - ASSESSMENT
72 YO Female POD#2 s/p Laparoscopic sigmoid colectomy, passing flatus. Vitals stable at present. 70 YO Female POD#2 s/p Laparoscopic sigmoid colectomy, passing flatus and having multiple loose BMs. Vitals stable at present.

## 2022-07-14 LAB
ANION GAP SERPL CALC-SCNC: 8 MMOL/L — SIGNIFICANT CHANGE UP (ref 5–17)
BUN SERPL-MCNC: 8 MG/DL — SIGNIFICANT CHANGE UP (ref 7–23)
CALCIUM SERPL-MCNC: 9.7 MG/DL — SIGNIFICANT CHANGE UP (ref 8.5–10.1)
CHLORIDE SERPL-SCNC: 108 MMOL/L — SIGNIFICANT CHANGE UP (ref 96–108)
CO2 SERPL-SCNC: 24 MMOL/L — SIGNIFICANT CHANGE UP (ref 22–31)
CREAT SERPL-MCNC: 0.81 MG/DL — SIGNIFICANT CHANGE UP (ref 0.5–1.3)
EGFR: 77 ML/MIN/1.73M2 — SIGNIFICANT CHANGE UP
GLUCOSE SERPL-MCNC: 120 MG/DL — HIGH (ref 70–99)
HCT VFR BLD CALC: 39.5 % — SIGNIFICANT CHANGE UP (ref 34.5–45)
HGB BLD-MCNC: 12.9 G/DL — SIGNIFICANT CHANGE UP (ref 11.5–15.5)
MAGNESIUM SERPL-MCNC: 2.1 MG/DL — SIGNIFICANT CHANGE UP (ref 1.6–2.6)
MCHC RBC-ENTMCNC: 30.1 PG — SIGNIFICANT CHANGE UP (ref 27–34)
MCHC RBC-ENTMCNC: 32.7 GM/DL — SIGNIFICANT CHANGE UP (ref 32–36)
MCV RBC AUTO: 92.3 FL — SIGNIFICANT CHANGE UP (ref 80–100)
NRBC # BLD: 0 /100 WBCS — SIGNIFICANT CHANGE UP (ref 0–0)
PHOSPHATE SERPL-MCNC: 3 MG/DL — SIGNIFICANT CHANGE UP (ref 2.5–4.5)
PLATELET # BLD AUTO: 443 K/UL — HIGH (ref 150–400)
POTASSIUM SERPL-MCNC: 3.9 MMOL/L — SIGNIFICANT CHANGE UP (ref 3.5–5.3)
POTASSIUM SERPL-SCNC: 3.9 MMOL/L — SIGNIFICANT CHANGE UP (ref 3.5–5.3)
RBC # BLD: 4.28 M/UL — SIGNIFICANT CHANGE UP (ref 3.8–5.2)
RBC # FLD: 14 % — SIGNIFICANT CHANGE UP (ref 10.3–14.5)
SODIUM SERPL-SCNC: 140 MMOL/L — SIGNIFICANT CHANGE UP (ref 135–145)
WBC # BLD: 10.39 K/UL — SIGNIFICANT CHANGE UP (ref 3.8–10.5)
WBC # FLD AUTO: 10.39 K/UL — SIGNIFICANT CHANGE UP (ref 3.8–10.5)

## 2022-07-14 PROCEDURE — 99232 SBSQ HOSP IP/OBS MODERATE 35: CPT

## 2022-07-14 PROCEDURE — 99024 POSTOP FOLLOW-UP VISIT: CPT

## 2022-07-14 RX ORDER — OXYCODONE HYDROCHLORIDE 5 MG/1
5 TABLET ORAL EVERY 4 HOURS
Refills: 0 | Status: DISCONTINUED | OUTPATIENT
Start: 2022-07-14 | End: 2022-07-15

## 2022-07-14 RX ORDER — ACETAMINOPHEN 500 MG
1000 TABLET ORAL EVERY 6 HOURS
Refills: 0 | Status: DISCONTINUED | OUTPATIENT
Start: 2022-07-14 | End: 2022-07-15

## 2022-07-14 RX ORDER — IBUPROFEN 200 MG
400 TABLET ORAL EVERY 6 HOURS
Refills: 0 | Status: DISCONTINUED | OUTPATIENT
Start: 2022-07-14 | End: 2022-07-15

## 2022-07-14 RX ADMIN — HEPARIN SODIUM 5000 UNIT(S): 5000 INJECTION INTRAVENOUS; SUBCUTANEOUS at 21:19

## 2022-07-14 RX ADMIN — PIPERACILLIN AND TAZOBACTAM 25 GRAM(S): 4; .5 INJECTION, POWDER, LYOPHILIZED, FOR SOLUTION INTRAVENOUS at 12:16

## 2022-07-14 RX ADMIN — Medication 1000 MILLIGRAM(S): at 12:15

## 2022-07-14 RX ADMIN — ATORVASTATIN CALCIUM 40 MILLIGRAM(S): 80 TABLET, FILM COATED ORAL at 21:19

## 2022-07-14 RX ADMIN — PIPERACILLIN AND TAZOBACTAM 25 GRAM(S): 4; .5 INJECTION, POWDER, LYOPHILIZED, FOR SOLUTION INTRAVENOUS at 04:21

## 2022-07-14 RX ADMIN — Medication 100 MILLIGRAM(S): at 21:19

## 2022-07-14 RX ADMIN — Medication 1000 MILLIGRAM(S): at 23:22

## 2022-07-14 RX ADMIN — PIPERACILLIN AND TAZOBACTAM 25 GRAM(S): 4; .5 INJECTION, POWDER, LYOPHILIZED, FOR SOLUTION INTRAVENOUS at 20:18

## 2022-07-14 RX ADMIN — HEPARIN SODIUM 5000 UNIT(S): 5000 INJECTION INTRAVENOUS; SUBCUTANEOUS at 05:19

## 2022-07-14 RX ADMIN — HEPARIN SODIUM 5000 UNIT(S): 5000 INJECTION INTRAVENOUS; SUBCUTANEOUS at 17:24

## 2022-07-14 NOTE — PROGRESS NOTE ADULT - NS ATTEND AMEND GEN_ALL_CORE FT
Discussed with PA earlier today and plan as documented above.  Seen by me this evening.  OOB and ambulating.  Doing very well.  Denies abdominal pain.  Reports passing flatus but aside from loose BM on POD 2 she has not had any additional bowel movements.  Abdomen remains soft.  Surgical incisions well approximated, dressings remain clean and dry.  No signs of infection.  Labs appropriate  Tolerating regular diet.  Anticipate discharge to home tomorrow.

## 2022-07-14 NOTE — PROGRESS NOTE ADULT - ASSESSMENT
72 YO Female POD#3 s/p Laparoscopic sigmoid colectomy, passing flatus and having multiple loose BMs with clot.

## 2022-07-14 NOTE — PROGRESS NOTE ADULT - SUBJECTIVE AND OBJECTIVE BOX
CHIEF COMPLAINT/INTERVAL HISTORY:  Pt. seen and evaluated for recurring acute diverticulitis.  Pt. is in no distress.  Starting solid foods today.  Tolerating IV antibiotics.  No significant abdominal pain.  Reports incisional discomfort with palpation.      REVIEW OF SYSTEMS:  No fever, CP, or SOB.      Vital Signs Last 24 Hrs  T(C): 36.6 (14 Jul 2022 04:45), Max: 37.2 (13 Jul 2022 19:53)  T(F): 97.9 (14 Jul 2022 04:45), Max: 98.9 (13 Jul 2022 19:53)  HR: 89 (14 Jul 2022 04:45) (89 - 94)  BP: 157/63 (14 Jul 2022 04:45) (150/65 - 157/63)  BP(mean): --  RR: 19 (14 Jul 2022 04:45) (18 - 19)  SpO2: 96% (14 Jul 2022 04:45) (94% - 96%)    Parameters below as of 14 Jul 2022 04:45  Patient On (Oxygen Delivery Method): room air        PHYSICAL EXAM:  GENERAL: NAD  HEENT: EOMI, hearing normal, conjunctiva and sclera clear  Chest: CTA bilaterally, no wheezing  CV: S1S2, RRR,   GI: soft, +BS, NT/ND, small laparoscopic incisions intact, mild incision discomfort with palpation  Musculoskeletal: no edema  Psychiatric: affect nL, mood nL  Skin: warm and dry    LABS:                        11.8   11.77 )-----------( 384      ( 13 Jul 2022 08:50 )             35.7     07-13    139  |  109<H>  |  6<L>  ----------------------------<  273<H>  3.2<L>   |  21<L>  |  0.80    Ca    8.7      13 Jul 2022 08:50  Phos  2.0     07-13  Mg     2.1     07-13

## 2022-07-14 NOTE — PROGRESS NOTE ADULT - NS ATTEND AMEND GEN_ALL_CORE FT
She reports bp is always high in the hospital.  Can start losartan and she can follow up outpatient to possibly stop it.  To follow while admitted.

## 2022-07-14 NOTE — PROGRESS NOTE ADULT - SUBJECTIVE AND OBJECTIVE BOX
[facility] -- Wu Moreno Grossman, [place], Ameya Garay Savella, Goodger: Office # [phone]    Follow Up: Diverticulitis s/p sigmoid colectomy. HLD    Subjective/Observations: Patient seen and examined. Patient awake, alert, resting in bed. No complaints of chest pain, dyspnea, palpitations or dizziness. No signs of orthopnea or PND. Tolerating room air. Continues to have some postop discomfort and loose bowel movements     REVIEW OF SYSTEMS: All other review of systems are negative unless indicated above    PAST MEDICAL & SURGICAL HISTORY:  Sigmoid diverticulitis    HLD (hyperlipidemia)    COPD (chronic obstructive pulmonary disease)      MEDICATIONS  (STANDING):  acetaminophen     Tablet .. 1000 milliGRAM(s) Oral every 6 hours  atorvastatin 40 milliGRAM(s) Oral at bedtime  heparin   Injectable 5000 Unit(s) SubCutaneous every 8 hours  piperacillin/tazobactam IVPB.. 3.375 Gram(s) IV Intermittent every 8 hours  traZODone 100 milliGRAM(s) Oral at bedtime    MEDICATIONS  (PRN):  ibuprofen  Tablet. 400 milliGRAM(s) Oral every 6 hours PRN Mild Pain (1 - 3), Moderate Pain (4 - 6)  ondansetron Injectable 4 milliGRAM(s) IV Push every 6 hours PRN Nausea  oxyCODONE    IR 5 milliGRAM(s) Oral every 4 hours PRN Severe Pain (7 - 10)    Allergies  No Known Allergies    Vital Signs Last 24 Hrs  T(C): 36.6 (14 Jul 2022 04:45), Max: 37.2 (13 Jul 2022 19:53)  T(F): 97.9 (14 Jul 2022 04:45), Max: 98.9 (13 Jul 2022 19:53)  HR: 89 (14 Jul 2022 04:45) (89 - 94)  BP: 157/63 (14 Jul 2022 04:45) (150/65 - 157/63)  BP(mean): --  RR: 19 (14 Jul 2022 04:45) (18 - 19)  SpO2: 96% (14 Jul 2022 04:45) (94% - 96%)    Parameters below as of 14 Jul 2022 04:45  Patient On (Oxygen Delivery Method): room air      I&O's Summary    13 Jul 2022 07:01  -  14 Jul 2022 07:00  --------------------------------------------------------  IN: 340 mL / OUT: 0 mL / NET: 340 mL        TELE: Not on telemetry   PHYSICAL EXAM:  Constitutional: NAD, awake and alert, Well developed   HEENT: Moist Mucous Membranes, Anicteric  Pulmonary: Non-labored, breath sounds are clear bilaterally, No wheezing, rales or rhonchi  Cardiovascular: Regular, S1 and S2, No murmurs, No rubs, gallops or clicks  Gastrointestinal:  soft, nontender, nondistended , + abdominal incision   Lymph: No peripheral edema. No lymphadenopathy.   Skin: No visible rashes or ulcers.  Psych:  Mood & affect appropriate      LABS: All Labs Reviewed:                        11.8   11.77 )-----------( 384      ( 13 Jul 2022 08:50 )             35.7                         12.4   17.18 )-----------( 352      ( 12 Jul 2022 07:35 )             38.5                         12.3   5.30  )-----------( 378      ( 11 Jul 2022 08:22 )             36.6     13 Jul 2022 08:50    139    |  109    |  6      ----------------------------<  273    3.2     |  21     |  0.80   12 Jul 2022 07:35    139    |  105    |  6      ----------------------------<  211    3.1     |  24     |  0.78   11 Jul 2022 08:22    143    |  113    |  4      ----------------------------<  107    3.2     |  23     |  0.50     Ca    8.7        13 Jul 2022 08:50  Ca    8.8        12 Jul 2022 07:35  Ca    9.1        11 Jul 2022 08:22  Phos  2.0       13 Jul 2022 08:50  Phos  2.8       12 Jul 2022 07:35  Mg     2.1       13 Jul 2022 08:50  Mg     1.7       12 Jul 2022 07:35    TPro  6.7    /  Alb  3.2    /  TBili  0.4    /  DBili  x      /  AST  15     /  ALT  19     /  AlkPhos  54     11 Jul 2022 08:22       12 Lead ECG:   Ventricular Rate 75 BPM    Atrial Rate 75 BPM    P-R Interval 136 ms    QRS Duration 92 ms    Q-T Interval 394 ms    QTC Calculation(Bazett) 439 ms    P Axis 67 degrees    R Axis 43 degrees    T Axis 65 degrees    Diagnosis Line Normal sinus rhythm  Normal ECG  No previous ECGs available  Confirmed by Jaquelin Marroquin ([id number]) on 7/9/2022 11:19:08 AM (07-08-22 @ 14:46)     [facility] -- Wu Moreno, Chay Concepcion, Ameya Garay, Lopez Etienne: Office # [phone]    Follow Up: Diverticulitis s/p sigmoid colectomy. HLD    Subjective/Observations: Patient seen and examined. Patient awake, alert, resting in bed. No complaints of chest pain, dyspnea, palpitations or dizziness. No signs of orthopnea or PND. Tolerating room air. Continues to have some postop discomfort and black loose bowel movements. Tolerating PO diet    REVIEW OF SYSTEMS: All other review of systems are negative unless indicated above    PAST MEDICAL & SURGICAL HISTORY:  Sigmoid diverticulitis    HLD (hyperlipidemia)    COPD (chronic obstructive pulmonary disease)      MEDICATIONS  (STANDING):  acetaminophen     Tablet .. 1000 milliGRAM(s) Oral every 6 hours  atorvastatin 40 milliGRAM(s) Oral at bedtime  heparin   Injectable 5000 Unit(s) SubCutaneous every 8 hours  piperacillin/tazobactam IVPB.. 3.375 Gram(s) IV Intermittent every 8 hours  traZODone 100 milliGRAM(s) Oral at bedtime    MEDICATIONS  (PRN):  ibuprofen  Tablet. 400 milliGRAM(s) Oral every 6 hours PRN Mild Pain (1 - 3), Moderate Pain (4 - 6)  ondansetron Injectable 4 milliGRAM(s) IV Push every 6 hours PRN Nausea  oxyCODONE    IR 5 milliGRAM(s) Oral every 4 hours PRN Severe Pain (7 - 10)    Allergies  No Known Allergies    Vital Signs Last 24 Hrs  T(C): 36.6 (14 Jul 2022 04:45), Max: 37.2 (13 Jul 2022 19:53)  T(F): 97.9 (14 Jul 2022 04:45), Max: 98.9 (13 Jul 2022 19:53)  HR: 89 (14 Jul 2022 04:45) (89 - 94)  BP: 157/63 (14 Jul 2022 04:45) (150/65 - 157/63)  BP(mean): --  RR: 19 (14 Jul 2022 04:45) (18 - 19)  SpO2: 96% (14 Jul 2022 04:45) (94% - 96%)    Parameters below as of 14 Jul 2022 04:45  Patient On (Oxygen Delivery Method): room air      I&O's Summary    13 Jul 2022 07:01  -  14 Jul 2022 07:00  --------------------------------------------------------  IN: 340 mL / OUT: 0 mL / NET: 340 mL        TELE: Not on telemetry   PHYSICAL EXAM:  Constitutional: NAD, awake and alert, Well developed   HEENT: Moist Mucous Membranes, Anicteric  Pulmonary: Non-labored, breath sounds are clear bilaterally, No wheezing, rales or rhonchi  Cardiovascular: Regular, S1 and S2, No murmurs, No rubs, gallops or clicks  Gastrointestinal:  soft, nontender, nondistended , + abdominal incision   Lymph: No peripheral edema. No lymphadenopathy.   Skin: No visible rashes or ulcers.  Psych:  Mood & affect appropriate      LABS: All Labs Reviewed:                        11.8   11.77 )-----------( 384      ( 13 Jul 2022 08:50 )             35.7                         12.4   17.18 )-----------( 352      ( 12 Jul 2022 07:35 )             38.5                         12.3   5.30  )-----------( 378      ( 11 Jul 2022 08:22 )             36.6     13 Jul 2022 08:50    139    |  109    |  6      ----------------------------<  273    3.2     |  21     |  0.80   12 Jul 2022 07:35    139    |  105    |  6      ----------------------------<  211    3.1     |  24     |  0.78   11 Jul 2022 08:22    143    |  113    |  4      ----------------------------<  107    3.2     |  23     |  0.50     Ca    8.7        13 Jul 2022 08:50  Ca    8.8        12 Jul 2022 07:35  Ca    9.1        11 Jul 2022 08:22  Phos  2.0       13 Jul 2022 08:50  Phos  2.8       12 Jul 2022 07:35  Mg     2.1       13 Jul 2022 08:50  Mg     1.7       12 Jul 2022 07:35    TPro  6.7    /  Alb  3.2    /  TBili  0.4    /  DBili  x      /  AST  15     /  ALT  19     /  AlkPhos  54     11 Jul 2022 08:22       12 Lead ECG:   Ventricular Rate 75 BPM    Atrial Rate 75 BPM    P-R Interval 136 ms    QRS Duration 92 ms    Q-T Interval 394 ms    QTC Calculation(Bazett) 439 ms    P Axis 67 degrees    R Axis 43 degrees    T Axis 65 degrees    Diagnosis Line Normal sinus rhythm  Normal ECG  No previous ECGs available  Confirmed by Jaquelin Marroquin ([id number]) on 7/9/2022 11:19:08 AM (07-08-22 @ 14:46)     [facility] -- Wu Moreno, Chay Concepcion, Ameya Garay, Lopez Etienne: Office # [phone]    Follow Up: Diverticulitis s/p sigmoid colectomy. HLD    Subjective/Observations: Patient seen and examined. Patient awake, alert, resting in bed. No complaints of chest pain, dyspnea, palpitations or dizziness. No signs of orthopnea or PND. Tolerating room air. Continues to have some postop discomfort and black loose bowel movements. Tolerating PO diet    REVIEW OF SYSTEMS: All other review of systems are negative unless indicated above    PAST MEDICAL & SURGICAL HISTORY:  Sigmoid diverticulitis    HLD (hyperlipidemia)    COPD (chronic obstructive pulmonary disease)      MEDICATIONS  (STANDING):  acetaminophen     Tablet .. 1000 milliGRAM(s) Oral every 6 hours  atorvastatin 40 milliGRAM(s) Oral at bedtime  heparin   Injectable 5000 Unit(s) SubCutaneous every 8 hours  piperacillin/tazobactam IVPB.. 3.375 Gram(s) IV Intermittent every 8 hours  traZODone 100 milliGRAM(s) Oral at bedtime    MEDICATIONS  (PRN):  ibuprofen  Tablet. 400 milliGRAM(s) Oral every 6 hours PRN Mild Pain (1 - 3), Moderate Pain (4 - 6)  ondansetron Injectable 4 milliGRAM(s) IV Push every 6 hours PRN Nausea  oxyCODONE    IR 5 milliGRAM(s) Oral every 4 hours PRN Severe Pain (7 - 10)    Allergies  No Known Allergies    Vital Signs Last 24 Hrs  T(C): 36.6 (14 Jul 2022 04:45), Max: 37.2 (13 Jul 2022 19:53)  T(F): 97.9 (14 Jul 2022 04:45), Max: 98.9 (13 Jul 2022 19:53)  HR: 89 (14 Jul 2022 04:45) (89 - 94)  BP: 157/63 (14 Jul 2022 04:45) (150/65 - 157/63)  BP(mean): --  RR: 19 (14 Jul 2022 04:45) (18 - 19)  SpO2: 96% (14 Jul 2022 04:45) (94% - 96%)    Parameters below as of 14 Jul 2022 04:45  Patient On (Oxygen Delivery Method): room air      I&O's Summary    13 Jul 2022 07:01  -  14 Jul 2022 07:00  --------------------------------------------------------  IN: 340 mL / OUT: 0 mL / NET: 340 mL        TELE: Not on telemetry   PHYSICAL EXAM:  Constitutional: NAD, awake and alert, Well developed   HEENT: Moist Mucous Membranes, Anicteric  Pulmonary: Non-labored, breath sounds are clear bilaterally, No wheezing, rales or rhonchi  Cardiovascular: Regular, S1 and S2, No murmurs, No rubs, gallops or clicks  Gastrointestinal:  soft, nontender, nondistended , + abdominal incision   Lymph: No peripheral edema. No lymphadenopathy.   Skin: No visible rashes or ulcers.  Psych:  Mood & affect appropriate      LABS: All Labs Reviewed:                                   12.9   10.39 )-----------( 443      ( 14 Jul 2022 08:05 )             39.5                         11.8   11.77 )-----------( 384      ( 13 Jul 2022 08:50 )             35.7                         12.4   17.18 )-----------( 352      ( 12 Jul 2022 07:35 )             38.5     14 Jul 2022 08:05    140    |  108    |  8      ----------------------------<  120    3.9     |  24     |  0.81   13 Jul 2022 08:50    139    |  109    |  6      ----------------------------<  273    3.2     |  21     |  0.80   12 Jul 2022 07:35    139    |  105    |  6      ----------------------------<  211    3.1     |  24     |  0.78     Ca    9.7        14 Jul 2022 08:05  Ca    8.7        13 Jul 2022 08:50  Ca    8.8        12 Jul 2022 07:35  Phos  2.0       13 Jul 2022 08:50  Phos  2.8       12 Jul 2022 07:35  Mg     2.1       13 Jul 2022 08:50  Mg     1.7       12 Jul 2022 07:35           COVID-19 PCR: NotDetec (07-08-22 @ 13:30)    12 Lead ECG:   Ventricular Rate 75 BPM    Atrial Rate 75 BPM    P-R Interval 136 ms    QRS Duration 92 ms    Q-T Interval 394 ms    QTC Calculation(Bazett) 439 ms    P Axis 67 degrees    R Axis 43 degrees    T Axis 65 degrees    Diagnosis Line Normal sinus rhythm  Normal ECG  No previous ECGs available  Confirmed by Jaquelin Marroquin ([id number]) on 7/9/2022 11:19:08 AM (07-08-22 @ 14:46)

## 2022-07-14 NOTE — PROGRESS NOTE ADULT - SUBJECTIVE AND OBJECTIVE BOX
SUBJECTIVE:  Patient seen and examined at bedside.  No overnight events.  Patient with no new complaints at this time, tolerating full liquid diet, admits to flatus and dark blood in bowel movements.  Patient denies any fevers, chills, chest pain, shortness of breath, nausea, vomiting or diarrhea.    Vital Signs Last 24 Hrs  T(C): 36.6 (14 Jul 2022 04:45), Max: 37.2 (13 Jul 2022 19:53)  T(F): 97.9 (14 Jul 2022 04:45), Max: 98.9 (13 Jul 2022 19:53)  HR: 89 (14 Jul 2022 04:45) (89 - 94)  BP: 157/63 (14 Jul 2022 04:45) (150/65 - 157/63)  BP(mean): --  RR: 19 (14 Jul 2022 04:45) (18 - 19)  SpO2: 96% (14 Jul 2022 04:45) (94% - 96%)    Parameters below as of 14 Jul 2022 04:45  Patient On (Oxygen Delivery Method): room air        PHYSICAL EXAM:  GENERAL: No acute distress, well-developed  HEAD:  Atraumatic, Normocephalic  ABDOMEN: Soft, minimally-tender, non-distended; bowel sounds+  NEUROLOGY: A&O x 3, no focal deficits    I&O's Summary    12 Jul 2022 07:01  -  13 Jul 2022 07:00  --------------------------------------------------------  IN: 240 mL / OUT: 300 mL / NET: -60 mL    13 Jul 2022 07:01  -  14 Jul 2022 05:47  --------------------------------------------------------  IN: 340 mL / OUT: 0 mL / NET: 340 mL      I&O's Detail    12 Jul 2022 07:01  -  13 Jul 2022 07:00  --------------------------------------------------------  IN:    Oral Fluid: 240 mL  Total IN: 240 mL    OUT:    Voided (mL): 300 mL  Total OUT: 300 mL    Total NET: -60 mL      13 Jul 2022 07:01  -  14 Jul 2022 05:47  --------------------------------------------------------  IN:    IV PiggyBack: 100 mL    Oral Fluid: 240 mL  Total IN: 340 mL    OUT:  Total OUT: 0 mL    Total NET: 340 mL        MEDICATIONS  (STANDING):  atorvastatin 40 milliGRAM(s) Oral at bedtime  heparin   Injectable 5000 Unit(s) SubCutaneous every 8 hours  piperacillin/tazobactam IVPB.. 3.375 Gram(s) IV Intermittent every 8 hours  traZODone 100 milliGRAM(s) Oral at bedtime    MEDICATIONS  (PRN):  HYDROmorphone  Injectable 0.2 milliGRAM(s) IV Push every 2 hours PRN Severe Pain (7 - 10)  ondansetron Injectable 4 milliGRAM(s) IV Push every 6 hours PRN Nausea  oxyCODONE    IR 5 milliGRAM(s) Oral every 4 hours PRN Moderate Pain (4 - 6)    LABS:                        11.8   11.77 )-----------( 384      ( 13 Jul 2022 08:50 )             35.7     07-13    139  |  109<H>  |  6<L>  ----------------------------<  273<H>  3.2<L>   |  21<L>  |  0.80    Ca    8.7      13 Jul 2022 08:50  Phos  2.0     07-13  Mg     2.1     07-13          RADIOLOGY & ADDITIONAL STUDIES:

## 2022-07-14 NOTE — PROGRESS NOTE ADULT - ASSESSMENT
70 y/o female with PMHx of HLD, COPD, and multiple bouts of diverticulitis, presenting with abdominal pain admitted for recurrent acute diverticulitis, now s/p sigmoidectomy on Monday 7/11.      Problem/Recommendation - 1:  ·  Problem: Diverticulitis. -  Pt presents with abdominal pain   - CT A/P: Focal mural thickening at the proximal sigmoid colon with adjacent stranding and diverticulosis, compatible with diverticulitis. No associated abscess is identified.   - s/p sigmoidectomy on Monday 7/11 with Dr. Olivo   - Advanced to low fiber diet per surgery recs  - c/w zosyn.  Analgesia PRN.   - Monitor fever and leukocytosis --- leukocytosis trending down after spike post surgery  - cardio ([facility]), recs appreciated     Problem/Recommendation - 2:  ·  Problem: HLD (hyperlipidemia).   ·  Recommendation: Chronic  Continue statin.       Problem/Recommendation - 3  hx  mood dis / anxiety - c/w Trazadone 100 mg po QHS     Problem/Recommendation - 4  ·  Problem: Need for prophylactic measure.   ·  Recommendation: DVT PPX: HSQ    - SCDs

## 2022-07-14 NOTE — PROGRESS NOTE ADULT - ASSESSMENT
70 y/o female with PMHx of HLD & COPD presenting with abdominal pain, found to have active diverticulitis on CT.s/p sigmoid colectomy on 7/11.    Postop, HLD  - EKG demonstrates NSR. No acute ischemic changes noted.   - Patient euvolemic on examination with no overt signs of heart failure or cardiac ischemia.   - No severe valvular abnormalities noted on examination  - No evidence of significant arrhythmia   - Recommend outpatient ECHO   - Continue Lipitor     - Can start losartan 25mg po for hypertension  - -160s     -  S/p Laparoscopic sigmoid colectomy  - Tolerated procedure well, cardiac status optimal post operatively  - Pain control  - Encourage Incentive Spirometry    - Monitor and replete lytes, keep K>4, Mg>2.  - Will continue to follow.    Laisha Olvera MS FNP, [facility]  Nurse Practitioner- Cardiology   Spectra #[phone] 70 y/o female with PMHx of HLD & COPD presenting with abdominal pain, found to have active diverticulitis on CT.s/p sigmoid colectomy on 7/11.    Postop, HLD  - EKG demonstrates NSR. No acute ischemic changes noted.   - Patient euvolemic on examination with no overt signs of heart failure or cardiac ischemia.   - No severe valvular abnormalities noted on examination  - No evidence of significant arrhythmia   - Recommend outpatient ECHO   - Continue Lipitor     - Can start losartan 25mg po for hypertension  - BP 150s     -  S/p Laparoscopic sigmoid colectomy  - Tolerated procedure well, cardiac status optimal post operatively  - Pain control  - Encourage Incentive Spirometry    - Monitor and replete lytes, keep K>4, Mg>2.  - Will continue to follow.    Laisha Olvera, MS FNP, [facility]  Nurse Practitioner- Cardiology   Spectra #[phone]

## 2022-07-15 ENCOUNTER — TRANSCRIPTION ENCOUNTER (OUTPATIENT)
Age: 72
End: 2022-07-15

## 2022-07-15 VITALS
TEMPERATURE: 98 F | OXYGEN SATURATION: 96 % | DIASTOLIC BLOOD PRESSURE: 65 MMHG | SYSTOLIC BLOOD PRESSURE: 149 MMHG | HEART RATE: 88 BPM | RESPIRATION RATE: 18 BRPM

## 2022-07-15 LAB
ANION GAP SERPL CALC-SCNC: 8 MMOL/L — SIGNIFICANT CHANGE UP (ref 5–17)
BASOPHILS # BLD AUTO: 0.06 K/UL — SIGNIFICANT CHANGE UP (ref 0–0.2)
BASOPHILS NFR BLD AUTO: 0.7 % — SIGNIFICANT CHANGE UP (ref 0–2)
BUN SERPL-MCNC: 11 MG/DL — SIGNIFICANT CHANGE UP (ref 7–23)
CALCIUM SERPL-MCNC: 9.4 MG/DL — SIGNIFICANT CHANGE UP (ref 8.5–10.1)
CHLORIDE SERPL-SCNC: 106 MMOL/L — SIGNIFICANT CHANGE UP (ref 96–108)
CO2 SERPL-SCNC: 25 MMOL/L — SIGNIFICANT CHANGE UP (ref 22–31)
CREAT SERPL-MCNC: 0.54 MG/DL — SIGNIFICANT CHANGE UP (ref 0.5–1.3)
EGFR: 98 ML/MIN/1.73M2 — SIGNIFICANT CHANGE UP
EOSINOPHIL # BLD AUTO: 0.19 K/UL — SIGNIFICANT CHANGE UP (ref 0–0.5)
EOSINOPHIL NFR BLD AUTO: 2.3 % — SIGNIFICANT CHANGE UP (ref 0–6)
GLUCOSE SERPL-MCNC: 120 MG/DL — HIGH (ref 70–99)
HCT VFR BLD CALC: 38.8 % — SIGNIFICANT CHANGE UP (ref 34.5–45)
HGB BLD-MCNC: 12.8 G/DL — SIGNIFICANT CHANGE UP (ref 11.5–15.5)
IMM GRANULOCYTES NFR BLD AUTO: 1.1 % — SIGNIFICANT CHANGE UP (ref 0–1.5)
LYMPHOCYTES # BLD AUTO: 0.98 K/UL — LOW (ref 1–3.3)
LYMPHOCYTES # BLD AUTO: 11.9 % — LOW (ref 13–44)
MAGNESIUM SERPL-MCNC: 2.2 MG/DL — SIGNIFICANT CHANGE UP (ref 1.6–2.6)
MCHC RBC-ENTMCNC: 29.9 PG — SIGNIFICANT CHANGE UP (ref 27–34)
MCHC RBC-ENTMCNC: 33 GM/DL — SIGNIFICANT CHANGE UP (ref 32–36)
MCV RBC AUTO: 90.7 FL — SIGNIFICANT CHANGE UP (ref 80–100)
MONOCYTES # BLD AUTO: 0.59 K/UL — SIGNIFICANT CHANGE UP (ref 0–0.9)
MONOCYTES NFR BLD AUTO: 7.2 % — SIGNIFICANT CHANGE UP (ref 2–14)
NEUTROPHILS # BLD AUTO: 6.33 K/UL — SIGNIFICANT CHANGE UP (ref 1.8–7.4)
NEUTROPHILS NFR BLD AUTO: 76.8 % — SIGNIFICANT CHANGE UP (ref 43–77)
NRBC # BLD: 0 /100 WBCS — SIGNIFICANT CHANGE UP (ref 0–0)
PHOSPHATE SERPL-MCNC: 3.1 MG/DL — SIGNIFICANT CHANGE UP (ref 2.5–4.5)
PLATELET # BLD AUTO: 457 K/UL — HIGH (ref 150–400)
POTASSIUM SERPL-MCNC: 3.9 MMOL/L — SIGNIFICANT CHANGE UP (ref 3.5–5.3)
POTASSIUM SERPL-SCNC: 3.9 MMOL/L — SIGNIFICANT CHANGE UP (ref 3.5–5.3)
RBC # BLD: 4.28 M/UL — SIGNIFICANT CHANGE UP (ref 3.8–5.2)
RBC # FLD: 13.7 % — SIGNIFICANT CHANGE UP (ref 10.3–14.5)
SARS-COV-2 RNA SPEC QL NAA+PROBE: SIGNIFICANT CHANGE UP
SODIUM SERPL-SCNC: 139 MMOL/L — SIGNIFICANT CHANGE UP (ref 135–145)
WBC # BLD: 8.24 K/UL — SIGNIFICANT CHANGE UP (ref 3.8–10.5)
WBC # FLD AUTO: 8.24 K/UL — SIGNIFICANT CHANGE UP (ref 3.8–10.5)

## 2022-07-15 PROCEDURE — 93005 ELECTROCARDIOGRAM TRACING: CPT

## 2022-07-15 PROCEDURE — 86901 BLOOD TYPING SEROLOGIC RH(D): CPT

## 2022-07-15 PROCEDURE — 85027 COMPLETE CBC AUTOMATED: CPT

## 2022-07-15 PROCEDURE — 85730 THROMBOPLASTIN TIME PARTIAL: CPT

## 2022-07-15 PROCEDURE — 96361 HYDRATE IV INFUSION ADD-ON: CPT

## 2022-07-15 PROCEDURE — C1889: CPT

## 2022-07-15 PROCEDURE — 82962 GLUCOSE BLOOD TEST: CPT

## 2022-07-15 PROCEDURE — 71045 X-RAY EXAM CHEST 1 VIEW: CPT

## 2022-07-15 PROCEDURE — 85025 COMPLETE CBC W/AUTO DIFF WBC: CPT

## 2022-07-15 PROCEDURE — 88304 TISSUE EXAM BY PATHOLOGIST: CPT

## 2022-07-15 PROCEDURE — 83036 HEMOGLOBIN GLYCOSYLATED A1C: CPT

## 2022-07-15 PROCEDURE — 86850 RBC ANTIBODY SCREEN: CPT

## 2022-07-15 PROCEDURE — 80048 BASIC METABOLIC PNL TOTAL CA: CPT

## 2022-07-15 PROCEDURE — 74177 CT ABD & PELVIS W/CONTRAST: CPT | Mod: MA

## 2022-07-15 PROCEDURE — 36415 COLL VENOUS BLD VENIPUNCTURE: CPT

## 2022-07-15 PROCEDURE — 81001 URINALYSIS AUTO W/SCOPE: CPT

## 2022-07-15 PROCEDURE — 86803 HEPATITIS C AB TEST: CPT

## 2022-07-15 PROCEDURE — 99285 EMERGENCY DEPT VISIT HI MDM: CPT

## 2022-07-15 PROCEDURE — 87086 URINE CULTURE/COLONY COUNT: CPT

## 2022-07-15 PROCEDURE — 80053 COMPREHEN METABOLIC PANEL: CPT

## 2022-07-15 PROCEDURE — 99232 SBSQ HOSP IP/OBS MODERATE 35: CPT

## 2022-07-15 PROCEDURE — 87635 SARS-COV-2 COVID-19 AMP PRB: CPT

## 2022-07-15 PROCEDURE — 84100 ASSAY OF PHOSPHORUS: CPT

## 2022-07-15 PROCEDURE — 88307 TISSUE EXAM BY PATHOLOGIST: CPT

## 2022-07-15 PROCEDURE — 99024 POSTOP FOLLOW-UP VISIT: CPT

## 2022-07-15 PROCEDURE — 96374 THER/PROPH/DIAG INJ IV PUSH: CPT

## 2022-07-15 PROCEDURE — 86900 BLOOD TYPING SEROLOGIC ABO: CPT

## 2022-07-15 PROCEDURE — 83735 ASSAY OF MAGNESIUM: CPT

## 2022-07-15 PROCEDURE — 85610 PROTHROMBIN TIME: CPT

## 2022-07-15 RX ADMIN — HEPARIN SODIUM 5000 UNIT(S): 5000 INJECTION INTRAVENOUS; SUBCUTANEOUS at 05:19

## 2022-07-15 RX ADMIN — Medication 1000 MILLIGRAM(S): at 00:30

## 2022-07-15 RX ADMIN — PIPERACILLIN AND TAZOBACTAM 25 GRAM(S): 4; .5 INJECTION, POWDER, LYOPHILIZED, FOR SOLUTION INTRAVENOUS at 04:18

## 2022-07-15 NOTE — PROGRESS NOTE ADULT - ASSESSMENT
72 y/o female with PMHx of HLD, COPD, and multiple bouts of diverticulitis, presenting with abdominal pain admitted for recurrent acute diverticulitis, now s/p sigmoidectomy on Monday 7/11.      Problem/Recommendation - 1:  ·  Problem: Diverticulitis. -  Pt presents with abdominal pain   - CT A/P: Focal mural thickening at the proximal sigmoid colon with adjacent stranding and diverticulosis, compatible with diverticulitis. No associated abscess is identified.   - s/p sigmoidectomy on Monday 7/11 with Dr. Olivo   - Tolerating low fiber diet   - c/w zosyn.  Analgesia PRN.   - Monitor fever and leukocytosis --- leukocytosis resolved  - Surgery f/u     Problem/Recommendation - 2:  ·  Problem: HLD (hyperlipidemia).   ·  Recommendation: Chronic  Continue statin.       Problem/Recommendation - 3  hx  mood dis / anxiety - c/w Trazadone 100 mg po QHS     Problem/Recommendation - 4  ·  Problem: Need for prophylactic measure.   ·  Recommendation: DVT PPX: HSQ    - SCDs

## 2022-07-15 NOTE — PROGRESS NOTE ADULT - REASON FOR ADMISSION
diverticulitis
acute diverticulitis
acute diverticulitis
diverticulitis

## 2022-07-15 NOTE — PROGRESS NOTE ADULT - PROVIDER SPECIALTY LIST ADULT
Surgery
Surgery
Anesthesia
Cardiology
Cardiology
Hospitalist
Surgery
Cardiology
Hospitalist
Surgery
Surgery
Cardiology
Surgery
Hospitalist

## 2022-07-15 NOTE — DISCHARGE NOTE NURSING/CASE MANAGEMENT/SOCIAL WORK - PATIENT PORTAL LINK FT
You can access the FollowMyHealth Patient Portal offered by [facility] by registering at the following website: [email or web address]. By joining [facility]’s FollowMyHealth portal, you will also be able to view your health information using other applications (apps) compatible with our system.

## 2022-07-15 NOTE — PROGRESS NOTE ADULT - PROBLEM SELECTOR PLAN 1
- Continue low residue diet  - Continue zosyn while inhouse  - Incentive spirometer  - Encourage ambulation  - DVT prophylaxis with SQH  - Pain control PRN  - F/u AM labs  - D/c planning likely later today  - To be discussed with Dr. Olivo
- F/u AM labs  - Adv to reg diet  - Cont IV Zosyn  - Monitor GI fxn  - DVT ppx: scds, SQH  - pain control adjusted to PO only, supportive care, OOB, incentive spirometer    Surgical Team Contact Information  Spectralink: Ext: [phone] or [phone]  Pager: [phone].
- F/u AM labs  - Cont CLD. Consider advancement  - Cont IV Zosyn  - Monitor GI fxn  - DVT ppx: scds, SQH  - pain control, supportive care, OOB, incentive spirometer  -Case to be discussed with Dr. Olivo    Surgical Team Contact Information  Spectralink: Ext: [phone] or [phone]  Pager: [phone]

## 2022-07-15 NOTE — PROGRESS NOTE ADULT - NS ATTEND AMEND GEN_ALL_CORE FT
Pt seen.  Continues to do well.   Reports formed BM overnight.  Vitals stable and afebrile  Abdomen soft.  Incisions clean and intact.  Discharge to home today  Continue low residue diet  Local wound care instructions provided.  Postoperative instructions have been provided including avoidance of heavy lifting and strenuous activities in excess of 20-25 pounds for duration of 4-6 weeks. The patient may shower keeping the incisions clean, dry, covered as needed.  f/u with me in 7-10 days.  Call my office for appointment. Pt seen.  Continues to do well.   Reports formed BM overnight.  Vitals stable and afebrile  Abdomen soft.  Incisions clean and intact.  Discharge to home today  Continue low residue diet  Local wound care instructions provided.  Pathology reviewed and benign. Findings include: 1. Sigmoid and left colon: - Portion of colon with multiple diverticula. - Omentum tissue with a small fibrous nodule with calcifications. - Benign lymph nodes. 2. Anastomotic donuts: - Small portion of benign colon tissue.   Postoperative instructions have been provided including avoidance of heavy lifting and strenuous activities in excess of 20-25 pounds for duration of 4-6 weeks. The patient may shower keeping the incisions clean, dry, covered as needed.  f/u with me in 7-10 days.  Call my office for appointment.

## 2022-07-15 NOTE — PROGRESS NOTE ADULT - ASSESSMENT
72 y/o female with PMHx of HLD & COPD presenting with abdominal pain, found to have active diverticulitis on CT.s/p sigmoid colectomy on 7/11.    Postop, HLD  - s/p lap sigmoid colectomy POD #4   - tolerated well from CV standpoint   - pain control per primary   - encourage incentive spirometry     - EKG demonstrates NSR. No acute ischemic changes noted.   - euvolemic on examination with no overt signs of heart failure or cardiac ischemia.   - No evidence of significant arrhythmia   - Recommend outpatient ECHO   - Continue Lipitor     - -150's   - Resume home dose losartan 25mg po    - Monitor and replete lytes, keep K>4, Mg>2.  - no objection for d/c, planning per primary  - Will continue to follow.    Ashlee Barnes [facility]  Nurse Practitioner - Cardiology   Spectra #[phone]

## 2022-07-15 NOTE — PROGRESS NOTE ADULT - SUBJECTIVE AND OBJECTIVE BOX
CHIEF COMPLAINT/INTERVAL HISTORY:  Pt. seen and evaluated for recurring diverticulitis.  Pt. is in no distress.  Tolerating IV antibiotics.  Reports having BM this morning.  Tolerating diet.      REVIEW OF SYSTEMS:  No fever, CP, SOB, or abdominal pain    Vital Signs Last 24 Hrs  T(C): 36.6 (15 Jul 2022 04:25), Max: 37 (14 Jul 2022 10:56)  T(F): 97.8 (15 Jul 2022 04:25), Max: 98.6 (14 Jul 2022 10:56)  HR: 84 (15 Jul 2022 04:25) (74 - 84)  BP: 155/85 (15 Jul 2022 04:25) (130/78 - 155/85)  BP(mean): --  RR: 18 (15 Jul 2022 04:25) (18 - 19)  SpO2: 96% (15 Jul 2022 04:25) (95% - 98%)    Parameters below as of 15 Jul 2022 04:25  Patient On (Oxygen Delivery Method): room air        PHYSICAL EXAM:  GENERAL: NAD  HEENT: EOMI, hearing normal, conjunctiva and sclera clear  Chest: CTA bilaterally, no wheezing  CV: S1S2, RRR,   GI: soft, +BS, NT/ND, small laparoscopic incisions intact  Musculoskeletal: no edema  Psychiatric: affect nL, mood nL  Skin: warm and dry    LABS:                        12.9   10.39 )-----------( 443      ( 14 Jul 2022 08:05 )             39.5     07-14    140  |  108  |  8   ----------------------------<  120<H>  3.9   |  24  |  0.81    Ca    9.7      14 Jul 2022 08:05  Phos  3.0     07-14  Mg     2.1     07-14

## 2022-07-15 NOTE — PROGRESS NOTE ADULT - SUBJECTIVE AND OBJECTIVE BOX
[facility] -- Wu Moreno Grossman, [place], Ameya Garay Savella, [place]: Office # [phone]    Follow Up:  Pre/post optimization, HLD     Subjective/Observations: Patient seen and examined, awake, alert, resting comfortably in bed, denies chest pain, dyspnea, palpitations or dizziness. Tolerating room air. ABX infusing     REVIEW OF SYSTEMS: All review of systems is negative for eye, ENT, GI, , allergic, dermatologic, musculoskeletal and neurologic except as described above    PAST MEDICAL & SURGICAL HISTORY:  Sigmoid diverticulitis      HLD (hyperlipidemia)      COPD (chronic obstructive pulmonary disease)          MEDICATIONS  (STANDING):  acetaminophen     Tablet .. 1000 milliGRAM(s) Oral every 6 hours  atorvastatin 40 milliGRAM(s) Oral at bedtime  heparin   Injectable 5000 Unit(s) SubCutaneous every 8 hours  piperacillin/tazobactam IVPB.. 3.375 Gram(s) IV Intermittent every 8 hours  traZODone 100 milliGRAM(s) Oral at bedtime    MEDICATIONS  (PRN):  ibuprofen  Tablet. 400 milliGRAM(s) Oral every 6 hours PRN Mild Pain (1 - 3), Moderate Pain (4 - 6)  ondansetron Injectable 4 milliGRAM(s) IV Push every 6 hours PRN Nausea  oxyCODONE    IR 5 milliGRAM(s) Oral every 4 hours PRN Severe Pain (7 - 10)    Allergies    No Known Allergies    Intolerances      Vital Signs Last 24 Hrs  T(C): 36.6 (15 Jul 2022 10:18), Max: 36.9 (14 Jul 2022 19:27)  T(F): 97.8 (15 Jul 2022 10:18), Max: 98.4 (14 Jul 2022 19:27)  HR: 88 (15 Jul 2022 10:18) (74 - 88)  BP: 149/65 (15 Jul 2022 10:18) (130/78 - 155/85)  BP(mean): --  RR: 18 (15 Jul 2022 10:18) (18 - 19)  SpO2: 96% (15 Jul 2022 10:18) (96% - 98%)    Parameters below as of 15 Jul 2022 10:18  Patient On (Oxygen Delivery Method): room air      I&O's Summary        TELE: Not on telemetry   PHYSICAL EXAM:  Constitutional: NAD, awake and alert, well-developed  HEENT: Moist Mucous Membranes, Anicteric  Pulmonary: Non-labored, breath sounds are clear bilaterally, No wheezing, rales or rhonchi  Cardiovascular: Regular, S1 and S2, No murmurs, rubs, gallops or clicks  Gastrointestinal: Bowel Sounds present, soft, nontender.   Lymph: No peripheral edema. No lymphadenopathy.  Skin: + umbilical dressing intact  Psych:  Mood & affect appropriate for situation    LABS: All Labs Reviewed:                        12.8   8.24  )-----------( 457      ( 15 Jul 2022 10:46 )             38.8                         12.9   10.39 )-----------( 443      ( 14 Jul 2022 08:05 )             39.5                         11.8   11.77 )-----------( 384      ( 13 Jul 2022 08:50 )             35.7     15 Jul 2022 10:46    139    |  106    |  11     ----------------------------<  120    3.9     |  25     |  0.54   14 Jul 2022 08:05    140    |  108    |  8      ----------------------------<  120    3.9     |  24     |  0.81   13 Jul 2022 08:50    139    |  109    |  6      ----------------------------<  273    3.2     |  21     |  0.80     Ca    9.4        15 Jul 2022 10:46  Ca    9.7        14 Jul 2022 08:05  Ca    8.7        13 Jul 2022 08:50  Phos  3.1       15 Jul 2022 10:46  Phos  3.0       14 Jul 2022 08:05  Phos  2.0       13 Jul 2022 08:50  Mg     2.2       15 Jul 2022 10:46  Mg     2.1       14 Jul 2022 08:05  Mg     2.1       13 Jul 2022 08:50                      12 Lead ECG:   Ventricular Rate 75 BPM    Atrial Rate 75 BPM    P-R Interval 136 ms    QRS Duration 92 ms    Q-T Interval 394 ms    QTC Calculation(Bazett) 439 ms    P Axis 67 degrees    R Axis 43 degrees    T Axis 65 degrees    Diagnosis Line Normal sinus rhythm  Normal ECG  No previous ECGs available  Confirmed by Jaquelin Marroquin ([id number]) on 7/9/2022 11:19:08 AM (07-08-22 @ 14:46)    < from: Xray Chest 1 View- PORTABLE-Routine (Xray Chest 1 View- PORTABLE-Routine .) (07.08.22 @ 14:34) >    ACC: [id number] EXAM:  XR CHEST PORTABLE ROUTINE 1V                          PROCEDURE DATE:  07/08/2022          INTERPRETATION:  Exam:XR CHEST    clinical history:Abdominal pain    Heart size is normal. Lungs show no acute infiltrate. No pleural effusion.    IMPRESSION:  No active parenchymal disease in the chest.        --- End of Report ---            SIXTO BAKER MD; Attending Radiologist  This document has been electronically signed. Jul 9 2022 10:17AM    < end of copied text >

## 2022-07-15 NOTE — DISCHARGE NOTE NURSING/CASE MANAGEMENT/SOCIAL WORK - NSDCPEFALRISK_GEN_ALL_CORE
For information on Fall & Injury Prevention, visit: [email or web address] OR  [email or web address] OR  https://www.cdc.gov/steadi/patient.html

## 2022-07-15 NOTE — PROGRESS NOTE ADULT - SUBJECTIVE AND OBJECTIVE BOX
SUBJECTIVE:  Patient seen and examined at bedside.  No overnight events.  Patient reports no new complaints at this time.  Admits to flatus and BM.  Voiding, ambulating and tolerating low residue diet.    Patient denies any fever, chills, chest pain, shortness of breath, nausea, vomiting, or urinary complaints.    VITALS  Vital Signs Last 24 Hrs  T(C): 36.6 (15 Jul 2022 04:25), Max: 37 (14 Jul 2022 10:56)  T(F): 97.8 (15 Jul 2022 04:25), Max: 98.6 (14 Jul 2022 10:56)  HR: 84 (15 Jul 2022 04:25) (74 - 84)  BP: 155/85 (15 Jul 2022 04:25) (130/78 - 155/85)  RR: 18 (15 Jul 2022 04:25) (18 - 19)  SpO2: 96% (15 Jul 2022 04:25) (95% - 98%)    PHYSICAL EXAM  GENERAL:  Well-nourished, well-developed female lying comfortably in bed in [facility].  HEENT:  NC/AT. Sclera white. Mucous membranes moist.  CARDIO:  Regular rate and rhythm.  No murmur, gallop or rub appreciated.  RESPIRATORY:  Nonlabored breathing, no accessory muscle use. Lungs clear to auscultation bilaterally.  No wheezing, rales or rhonchi appreciated.  ABDOMEN:  Soft, nondistended, +mild incisional tenderness. Trocar sites dressed with steris and bandaids, clean/dry. Umbilical incision dressed with 4x4 and tegaderm, clean/dry. No rebound tenderness or guarding.  EXTREMITIES: No calf tenderness bilaterally.  SKIN:  No jaundice, pallor, or cyanosis  NEURO:  A&O x 3    MEDICATIONS  MEDICATIONS  (STANDING):  acetaminophen     Tablet .. 1000 milliGRAM(s) Oral every 6 hours  atorvastatin 40 milliGRAM(s) Oral at bedtime  heparin   Injectable 5000 Unit(s) SubCutaneous every 8 hours  piperacillin/tazobactam IVPB.. 3.375 Gram(s) IV Intermittent every 8 hours  traZODone 100 milliGRAM(s) Oral at bedtime    MEDICATIONS  (PRN):  ibuprofen  Tablet. 400 milliGRAM(s) Oral every 6 hours PRN Mild Pain (1 - 3), Moderate Pain (4 - 6)  ondansetron Injectable 4 milliGRAM(s) IV Push every 6 hours PRN Nausea  oxyCODONE    IR 5 milliGRAM(s) Oral every 4 hours PRN Severe Pain (7 - 10)    LABS:                     12.9   10.39 )-----------( 443      ( 14 Jul 2022 08:05 )             39.5     07-14    140  |  108  |  8   ----------------------------<  120<H>  3.9   |  24  |  0.81    Ca    9.7      14 Jul 2022 08:05  Phos  3.0     07-14  Mg     2.1     07-14

## 2022-07-26 ENCOUNTER — APPOINTMENT (OUTPATIENT)
Dept: SURGERY | Facility: CLINIC | Age: 72
End: 2022-07-26

## 2022-07-26 VITALS
OXYGEN SATURATION: 95 % | DIASTOLIC BLOOD PRESSURE: 80 MMHG | SYSTOLIC BLOOD PRESSURE: 164 MMHG | TEMPERATURE: 97.8 F | HEART RATE: 82 BPM | RESPIRATION RATE: 16 BRPM | HEIGHT: 62 IN

## 2022-07-26 DIAGNOSIS — K57.30 DIVERTICULOSIS OF LARGE INTESTINE W/OUT PERFORATION OR ABSCESS W/OUT BLEEDING: ICD-10-CM

## 2022-07-26 DIAGNOSIS — K57.32 DIVERTICULITIS OF LARGE INTESTINE W/OUT PERFORATION OR ABSCESS W/OUT BLEEDING: ICD-10-CM

## 2022-07-26 PROCEDURE — 99024 POSTOP FOLLOW-UP VISIT: CPT

## 2022-08-02 PROBLEM — K57.30 COLON, DIVERTICULOSIS: Status: RESOLVED | Noted: 2022-05-17 | Resolved: 2022-08-02

## 2022-08-02 NOTE — PHYSICAL EXAM
[Normal Breath Sounds] : Normal breath sounds [Normal Heart Sounds] : normal heart sounds [Normal Rate and Rhythm] : normal rate and rhythm [No Rash or Lesion] : No rash or lesion [Alert] : alert [Oriented to Person] : oriented to person [Oriented to Place] : oriented to place [Oriented to Time] : oriented to time [Anxious] : anxious [de-identified] : Healthy appearing woman in no distress [de-identified] : ANGELITA FLOOD EOMI [de-identified] : Soft, nondistended, positive bowel sounds in all four quads.  No hernia or masses. No rebound or guarding.  Surgical incisions remain well approximated without erythema, induration or fluctuance.  No evidence of hernia or masses.  [de-identified] : Ambulating without difficulty or assistance.

## 2022-08-02 NOTE — HISTORY OF PRESENT ILLNESS
[de-identified] : Unremarkable postoperative course 1 week status post laparoscopic sigmoid colectomy

## 2022-08-02 NOTE — ASSESSMENT
[FreeTextEntry1] : 71-year-old woman status post laparoscopic left paul and sigmoid colectomy for diverticulitis.  Postoperative course has been unremarkable.  The patient still describes some hesitancy with bowel function but otherwise passing soft and well formed stools and flatus.  She denies symptoms of constipation.\par \par Abdominal exam is otherwise benign.  Surgical incisions remain well approximated and healing appropriately without erythema, induration or fluctuance.  No evidence of hernia or masses.\par \par Pathology reviewed consistent with a portion of colon with multiple diverticuli, omentum with small fibrous nodules with calcifications, benign lymph nodes.  Healthy surgical margins.\par \par Postoperative instructions have been provided including avoidance of heavy lifting and strenuous activities in excess of 20-25 pounds for duration of 4-6 weeks. The patient may shower keeping the incisions clean, dry, covered as needed.\par \par Follow-up in 4 to 6 weeks

## 2022-08-13 ENCOUNTER — EMERGENCY (EMERGENCY)
Facility: HOSPITAL | Age: 72
LOS: 1 days | Discharge: HOME | End: 2022-08-13
Attending: EMERGENCY MEDICINE | Admitting: EMERGENCY MEDICINE
Payer: MEDICARE

## 2022-08-13 VITALS
HEART RATE: 86 BPM | TEMPERATURE: 98 F | DIASTOLIC BLOOD PRESSURE: 112 MMHG | SYSTOLIC BLOOD PRESSURE: 168 MMHG | OXYGEN SATURATION: 97 % | WEIGHT: 139.99 LBS | RESPIRATION RATE: 20 BRPM | HEIGHT: 62 IN

## 2022-08-13 VITALS
HEART RATE: 84 BPM | RESPIRATION RATE: 18 BRPM | OXYGEN SATURATION: 99 % | TEMPERATURE: 98 F | SYSTOLIC BLOOD PRESSURE: 168 MMHG | DIASTOLIC BLOOD PRESSURE: 74 MMHG

## 2022-08-13 DIAGNOSIS — R10.9 UNSPECIFIED ABDOMINAL PAIN: ICD-10-CM

## 2022-08-13 LAB
ALBUMIN SERPL ELPH-MCNC: 4.3 G/DL — SIGNIFICANT CHANGE UP (ref 3.3–5)
ALP SERPL-CCNC: 75 U/L — SIGNIFICANT CHANGE UP (ref 40–120)
ALT FLD-CCNC: 26 U/L — SIGNIFICANT CHANGE UP (ref 12–78)
ANION GAP SERPL CALC-SCNC: 11 MMOL/L — SIGNIFICANT CHANGE UP (ref 5–17)
APPEARANCE UR: ABNORMAL
APTT BLD: 24.6 SEC — LOW (ref 27.5–35.5)
AST SERPL-CCNC: 32 U/L — SIGNIFICANT CHANGE UP (ref 15–37)
BASOPHILS # BLD AUTO: 0.06 K/UL — SIGNIFICANT CHANGE UP (ref 0–0.2)
BASOPHILS NFR BLD AUTO: 0.4 % — SIGNIFICANT CHANGE UP (ref 0–2)
BILIRUB SERPL-MCNC: 0.4 MG/DL — SIGNIFICANT CHANGE UP (ref 0.2–1.2)
BILIRUB UR-MCNC: NEGATIVE — SIGNIFICANT CHANGE UP
BLD GP AB SCN SERPL QL: SIGNIFICANT CHANGE UP
BUN SERPL-MCNC: 10 MG/DL — SIGNIFICANT CHANGE UP (ref 7–23)
CALCIUM SERPL-MCNC: 10 MG/DL — SIGNIFICANT CHANGE UP (ref 8.5–10.1)
CHLORIDE SERPL-SCNC: 98 MMOL/L — SIGNIFICANT CHANGE UP (ref 96–108)
CO2 SERPL-SCNC: 27 MMOL/L — SIGNIFICANT CHANGE UP (ref 22–31)
COLOR SPEC: SIGNIFICANT CHANGE UP
COMMENT - URINE: SIGNIFICANT CHANGE UP
CREAT SERPL-MCNC: 0.77 MG/DL — SIGNIFICANT CHANGE UP (ref 0.5–1.3)
DIFF PNL FLD: NEGATIVE — SIGNIFICANT CHANGE UP
EGFR: 82 ML/MIN/1.73M2 — SIGNIFICANT CHANGE UP
EOSINOPHIL # BLD AUTO: 0.01 K/UL — SIGNIFICANT CHANGE UP (ref 0–0.5)
EOSINOPHIL NFR BLD AUTO: 0.1 % — SIGNIFICANT CHANGE UP (ref 0–6)
EPI CELLS # UR: SIGNIFICANT CHANGE UP
FLUAV AG NPH QL: SIGNIFICANT CHANGE UP
FLUBV AG NPH QL: SIGNIFICANT CHANGE UP
GLUCOSE SERPL-MCNC: 129 MG/DL — HIGH (ref 70–99)
GLUCOSE UR QL: NEGATIVE — SIGNIFICANT CHANGE UP
HCG SERPL-ACNC: 1 MIU/ML — SIGNIFICANT CHANGE UP
HCT VFR BLD CALC: 40.2 % — SIGNIFICANT CHANGE UP (ref 34.5–45)
HGB BLD-MCNC: 13.7 G/DL — SIGNIFICANT CHANGE UP (ref 11.5–15.5)
IMM GRANULOCYTES NFR BLD AUTO: 0.3 % — SIGNIFICANT CHANGE UP (ref 0–1.5)
INR BLD: 1.18 RATIO — HIGH (ref 0.88–1.16)
KETONES UR-MCNC: NEGATIVE — SIGNIFICANT CHANGE UP
LACTATE SERPL-SCNC: 2 MMOL/L — SIGNIFICANT CHANGE UP (ref 0.7–2)
LEUKOCYTE ESTERASE UR-ACNC: ABNORMAL
LIDOCAIN IGE QN: 50 U/L — LOW (ref 73–393)
LYMPHOCYTES # BLD AUTO: 1.02 K/UL — SIGNIFICANT CHANGE UP (ref 1–3.3)
LYMPHOCYTES # BLD AUTO: 6.5 % — LOW (ref 13–44)
MCHC RBC-ENTMCNC: 30.2 PG — SIGNIFICANT CHANGE UP (ref 27–34)
MCHC RBC-ENTMCNC: 34.1 GM/DL — SIGNIFICANT CHANGE UP (ref 32–36)
MCV RBC AUTO: 88.5 FL — SIGNIFICANT CHANGE UP (ref 80–100)
MONOCYTES # BLD AUTO: 0.6 K/UL — SIGNIFICANT CHANGE UP (ref 0–0.9)
MONOCYTES NFR BLD AUTO: 3.8 % — SIGNIFICANT CHANGE UP (ref 2–14)
NEUTROPHILS # BLD AUTO: 13.91 K/UL — HIGH (ref 1.8–7.4)
NEUTROPHILS NFR BLD AUTO: 88.9 % — HIGH (ref 43–77)
NITRITE UR-MCNC: NEGATIVE — SIGNIFICANT CHANGE UP
NRBC # BLD: 0 /100 WBCS — SIGNIFICANT CHANGE UP (ref 0–0)
PH UR: 8 — SIGNIFICANT CHANGE UP (ref 5–8)
PLATELET # BLD AUTO: 377 K/UL — SIGNIFICANT CHANGE UP (ref 150–400)
POTASSIUM SERPL-MCNC: 4.2 MMOL/L — SIGNIFICANT CHANGE UP (ref 3.5–5.3)
POTASSIUM SERPL-SCNC: 4.2 MMOL/L — SIGNIFICANT CHANGE UP (ref 3.5–5.3)
PROT SERPL-MCNC: 7.3 G/DL — SIGNIFICANT CHANGE UP (ref 6–8.3)
PROT UR-MCNC: NEGATIVE — SIGNIFICANT CHANGE UP
PROTHROM AB SERPL-ACNC: 13.8 SEC — HIGH (ref 10.5–13.4)
RBC # BLD: 4.54 M/UL — SIGNIFICANT CHANGE UP (ref 3.8–5.2)
RBC # FLD: 12.9 % — SIGNIFICANT CHANGE UP (ref 10.3–14.5)
RBC CASTS # UR COMP ASSIST: NEGATIVE /HPF — SIGNIFICANT CHANGE UP (ref 0–4)
RSV RNA NPH QL NAA+NON-PROBE: SIGNIFICANT CHANGE UP
SARS-COV-2 RNA SPEC QL NAA+PROBE: SIGNIFICANT CHANGE UP
SODIUM SERPL-SCNC: 136 MMOL/L — SIGNIFICANT CHANGE UP (ref 135–145)
SP GR SPEC: 1.01 — SIGNIFICANT CHANGE UP (ref 1.01–1.02)
UROBILINOGEN FLD QL: NEGATIVE — SIGNIFICANT CHANGE UP
WBC # BLD: 15.65 K/UL — HIGH (ref 3.8–10.5)
WBC # FLD AUTO: 15.65 K/UL — HIGH (ref 3.8–10.5)
WBC UR QL: SIGNIFICANT CHANGE UP

## 2022-08-13 PROCEDURE — 71275 CT ANGIOGRAPHY CHEST: CPT | Mod: 26,MA

## 2022-08-13 PROCEDURE — 96374 THER/PROPH/DIAG INJ IV PUSH: CPT | Mod: XU

## 2022-08-13 PROCEDURE — 83605 ASSAY OF LACTIC ACID: CPT

## 2022-08-13 PROCEDURE — 86850 RBC ANTIBODY SCREEN: CPT

## 2022-08-13 PROCEDURE — 85025 COMPLETE CBC W/AUTO DIFF WBC: CPT

## 2022-08-13 PROCEDURE — 96375 TX/PRO/DX INJ NEW DRUG ADDON: CPT | Mod: XU

## 2022-08-13 PROCEDURE — 84702 CHORIONIC GONADOTROPIN TEST: CPT

## 2022-08-13 PROCEDURE — 86900 BLOOD TYPING SEROLOGIC ABO: CPT

## 2022-08-13 PROCEDURE — 71045 X-RAY EXAM CHEST 1 VIEW: CPT

## 2022-08-13 PROCEDURE — 93010 ELECTROCARDIOGRAM REPORT: CPT

## 2022-08-13 PROCEDURE — 87637 SARSCOV2&INF A&B&RSV AMP PRB: CPT

## 2022-08-13 PROCEDURE — 71045 X-RAY EXAM CHEST 1 VIEW: CPT | Mod: 26

## 2022-08-13 PROCEDURE — 85610 PROTHROMBIN TIME: CPT

## 2022-08-13 PROCEDURE — 74174 CTA ABD&PLVS W/CONTRAST: CPT | Mod: MA

## 2022-08-13 PROCEDURE — 99283 EMERGENCY DEPT VISIT LOW MDM: CPT | Mod: 24

## 2022-08-13 PROCEDURE — 83690 ASSAY OF LIPASE: CPT

## 2022-08-13 PROCEDURE — 99285 EMERGENCY DEPT VISIT HI MDM: CPT

## 2022-08-13 PROCEDURE — 93005 ELECTROCARDIOGRAM TRACING: CPT

## 2022-08-13 PROCEDURE — 99285 EMERGENCY DEPT VISIT HI MDM: CPT | Mod: 25

## 2022-08-13 PROCEDURE — 86901 BLOOD TYPING SEROLOGIC RH(D): CPT

## 2022-08-13 PROCEDURE — 80053 COMPREHEN METABOLIC PANEL: CPT

## 2022-08-13 PROCEDURE — 81001 URINALYSIS AUTO W/SCOPE: CPT

## 2022-08-13 PROCEDURE — 85730 THROMBOPLASTIN TIME PARTIAL: CPT

## 2022-08-13 PROCEDURE — 36415 COLL VENOUS BLD VENIPUNCTURE: CPT

## 2022-08-13 PROCEDURE — 71275 CT ANGIOGRAPHY CHEST: CPT | Mod: MA

## 2022-08-13 PROCEDURE — 74174 CTA ABD&PLVS W/CONTRAST: CPT | Mod: 26,MA

## 2022-08-13 RX ORDER — MORPHINE SULFATE 50 MG/1
4 CAPSULE, EXTENDED RELEASE ORAL ONCE
Refills: 0 | Status: DISCONTINUED | OUTPATIENT
Start: 2022-08-13 | End: 2022-08-13

## 2022-08-13 RX ORDER — ONDANSETRON 8 MG/1
4 TABLET, FILM COATED ORAL ONCE
Refills: 0 | Status: COMPLETED | OUTPATIENT
Start: 2022-08-13 | End: 2022-08-13

## 2022-08-13 RX ORDER — PANTOPRAZOLE SODIUM 20 MG/1
40 TABLET, DELAYED RELEASE ORAL ONCE
Refills: 0 | Status: COMPLETED | OUTPATIENT
Start: 2022-08-13 | End: 2022-08-13

## 2022-08-13 RX ORDER — SODIUM CHLORIDE 9 MG/ML
1000 INJECTION INTRAMUSCULAR; INTRAVENOUS; SUBCUTANEOUS ONCE
Refills: 0 | Status: COMPLETED | OUTPATIENT
Start: 2022-08-13 | End: 2022-08-13

## 2022-08-13 RX ORDER — PANTOPRAZOLE SODIUM 20 MG/1
1 TABLET, DELAYED RELEASE ORAL
Qty: 14 | Refills: 0
Start: 2022-08-13 | End: 2022-08-26

## 2022-08-13 RX ADMIN — MORPHINE SULFATE 4 MILLIGRAM(S): 50 CAPSULE, EXTENDED RELEASE ORAL at 16:52

## 2022-08-13 RX ADMIN — ONDANSETRON 4 MILLIGRAM(S): 8 TABLET, FILM COATED ORAL at 16:52

## 2022-08-13 RX ADMIN — SODIUM CHLORIDE 1000 MILLILITER(S): 9 INJECTION INTRAMUSCULAR; INTRAVENOUS; SUBCUTANEOUS at 16:52

## 2022-08-13 RX ADMIN — PANTOPRAZOLE SODIUM 40 MILLIGRAM(S): 20 TABLET, DELAYED RELEASE ORAL at 19:28

## 2022-08-13 NOTE — ED PROVIDER NOTE - CARE PROVIDER_API CALL
Javad Concepcion ()  Internal Medicine  Internal Medicine, [address] [place] [address]  Phone: [phone]  Fax: [phone]  Follow Up Time:     Darryn Olivo)  Surgery  [address] [place] [address]  Phone: [phone]  Fax: [phone]  Follow Up Time:

## 2022-08-13 NOTE — ED PROVIDER NOTE - PATIENT PORTAL LINK FT
You can access the FollowMyHealth Patient Portal offered by [facility] by registering at the following website: [email or web address]. By joining [employer]’s FollowMyHealth portal, you will also be able to view your health information using other applications (apps) compatible with our system.

## 2022-08-13 NOTE — CONSULT NOTE ADULT - PROBLEM SELECTOR RECOMMENDATION 9
Images and case reviewed with Dr. Olivo  Patient is cleared from surgical standpoint for discharge from ER  F/u with Dr. Olivo as outpatient  Recommend bland diet  PPI  Reviewed above plan with patient and family members, all questions answered. Images and case reviewed with Dr. Olivo.  No specific findings to correlate with her symptoms.  No indications for surgical admission or interventions.  F/u with Dr. Olivo as outpatient  Recommend bland diet  PPI  Reviewed above plan with patient and family members, all questions answered.

## 2022-08-13 NOTE — CONSULT NOTE ADULT - SUBJECTIVE AND OBJECTIVE BOX
HPI:  70 y/o F with PMHx HLD, COPD, s/p sigmoid colectomy 22 with Dr. Olivo, presents to ER with abdominal pain since this AM. Pain described as "gas-pain" located in supraumbilical and epigastric area. Radiates to back. Patient took Gas-X and Mylanta without relief. Associated with nausea. Patient came to ER after feeling the pain worsened. Last BM was last night and normal. Denies fever, chills, chest pain, shortness of breath, diarrhea, urinary complaints.     PAST MEDICAL & SURGICAL HISTORY:  Sigmoid diverticulitis  HLD (hyperlipidemia)  COPD (chronic obstructive pulmonary disease)  S/p sigmoid colectomy       REVIEW OF SYSTEMS:  CONSTITUTIONAL: No weakness, fevers, chills  EYES/ENT: No visual changes, vertigo, throat pain   NECK: No pain, stiffness  RESPIRATORY: No cough, wheezing, hemoptysis,  shortness of breath  CARDIOVASCULAR: No chest pain, palpitations  GASTROINTESTINAL: SEE HPI  GENITOURINARY: No dysuria, frequency, hematuria  NEUROLOGICAL: No numbness, weakness  SKIN: No itching, burning, rashes, lesions   All other review of systems is negative unless indicated above.    Vital Signs Last 24 Hrs  T(C): 36.8 (13 Aug 2022 15:37), Max: 36.8 (13 Aug 2022 15:37)  T(F): 98.3 (13 Aug 2022 15:37), Max: 98.3 (13 Aug 2022 15:37)  HR: 89 (13 Aug 2022 16:53) (84 - 89)  BP: 199/78 (13 Aug 2022 16:53) (168/112 - 210/88)  BP(mean): --  RR: 20 (13 Aug 2022 15:37) (20 - 20)  SpO2: 97% (13 Aug 2022 15:37) (97% - 97%)    Parameters below as of 13 Aug 2022 15:37  Patient On (Oxygen Delivery Method): room air    ALLERGIES:  No Known Allergies    Home Medications:  atorvastatin 40 mg oral tablet: 1 tab(s) orally once a day (2022 15:32)  traZODone 100 mg oral tablet: orally once a day (at bedtime) (2022 15:32)      PHYSICAL EXAM:  GENERAL: NAD, resting comfortably in bed  HEAD:  Atraumatic, Normocephalic  NECK: Supple  CHEST/LUNG: CTA B/L. Good inspiratory effort.  HEART: RRR. +S1/S2.  ABDOMEN: Previously healed surgical scars noted. Soft, nondistended. Mild tenderness to palpation supraumbilical/epigastric area. No rebound or guarding. +BS  EXTREMITIES:  No calf tenderness or edema b/l.  PSYCH: AAOx3. Appropriate affect.   NEUROLOGY: non-focal      LABS:                        13.7   15.65 )-----------( 377      ( 13 Aug 2022 16:00 )             40.2     PT/INR - ( 13 Aug 2022 16:00 )   PT: 13.8 sec;   INR: 1.18 ratio    PTT - ( 13 Aug 2022 16:00 )  PTT:24.6 sec    LIVER FUNCTIONS - ( 13 Aug 2022 16:00 )  Alb: 4.3 g/dL / Pro: 7.3 g/dL / ALK PHOS: 75 U/L / ALT: 26 U/L / AST: 32 U/L / GGT: x           Urinalysis Basic - ( 13 Aug 2022 17:42 )  Color: Pale Yellow / Appearance: Slightly Turbid / S.015 / pH: x  Gluc: x / Ketone: Negative  / Bili: Negative / Urobili: Negative   Blood: x / Protein: Negative / Nitrite: Negative   Leuk Esterase: Trace / RBC: Negative /HPF / WBC 0-2   Sq Epi: x / Non Sq Epi: Occasional / Bacteria: x        RADIOLOGY/IMAGING:    < from: CT Angio Abdomen and Pelvis w/ IV Cont (22 @ 17:21) >    ACC: [id number] EXAM:  CT ANGIO ABD PELV (W)AW IC                        ACC: [id number] EXAM:  CT ANGIO CHEST AORTA [facility]                          PROCEDURE DATE:  2022          INTERPRETATION:  INDICATION: Abdominal pain radiating to the back with   hypertension, rule out dissection    TECHNIQUE: Helical CTA images of the chest, abdomen and pelvis were   performed before and after the administration of 90cc Omnipaque 350   intravenous contrast from the thoracic inlet to the pelvic brim. Coronal   MIP reformats are provided.    COMPARISON: Abdominal CT 2022; no prior chest CT available for   comparison.    FINDINGS:  CTA: No intramural hematoma, aortic aneurysm or dissection. Three-vessel   left aortic arch. The celiac trunk, SMA, 2 right and single left renal   arteries and DELMI are patent.    Heart: The heart is normal in size. Mitral annular calcification is   present. Mild coronary arterial calcification. No pericardial effusion..    Lungs/Airways/Pleura: The central airways are patent. No pleural   effusion. Stable calcified granuloma at the left cardiophrenic angle.   There are branching tubular opacities in the right middle lobe, likely   due to mucoid impaction. There is a 3 mm nodule in the left lower lobe on   series2 image 72.    Mediastinum, Lymph nodes: No thoracic lymphadenopathy. Small hiatal   hernia. Small fat containing left Bochdalek hernia.    Pulmonary artery: Normal in size. No central pulmonary embolism.    Hepatobiliary: Heterogeneous enhancement of the hepatic parenchyma on   arterial phase imaging.    Pancreas: Unremarkable.    Spleen: Unremarkable.    Adrenal glands: Unremarkable.    Kidneys: Right renal cyst. Symmetric parenchymal enhancement. No   hydronephrosis..    Bowel: Interval sigmoidectomy. No bowel obstruction. Normal appendix. No   pneumatosis or pneumoperitoneum.    Abdominal wall: Postoperative changes along the anterior abdominal wall   related to interval laparoscopic procedure.    Abdominal lymph nodes: No lymphadenopathy.    Peritoneum: No ascites.    Pelvis: Calcified fibroid uterus.    Musculoskeletal system and soft tissue: No aggressive osseous lesion. L2   superior endplate compression deformity is unchanged since the prior study    Findings are confirmed on theMIP images.    IMPRESSION:  No aortic dissection, intramural hematoma or aneurysm.    Interval sigmoidectomy.    3 mm left lower lobe nodule; if patient is considered high risk for lung   cancer, consider follow-up low dose chest CT in 12 months. If low risk,   no further follow-up is needed as per current Fleischner guidelines.      --- End of Report ---      ANALIA MENESES M.D., Attending Radiologist  This document has been electronically signed. Aug 13 2022  5:34PM    < end of copied text >

## 2022-08-13 NOTE — ED ADULT NURSE NOTE - OBJECTIVE STATEMENT
Receive pt c/o severe abd pain which woke her out of sleep today at 2am.   Abd soft tender worse to palpation.   Pain radiates to mid back pain.    RUE bp = 210/88, LUE bp = 186/84.    Pt appears restless, denies cp/sob/palpitations.  Reports one episode of nausea, denies diarrhea/fevers.  Pt had bowel surgery last month for diverticulitis. BN Receive pt c/o severe abd pain which woke her out of sleep today at 2am.   Abd soft tender worse to palpation.   Pain radiates to mid back pain.    RUE bp = 210/88, LUE bp = 186/84.    Pt appears restless, denies cp/sob/palpitations.  Reports one episode of nausea, denies diarrhea/fevers.  Pt had bowel surgery last month for diverticulitis. BN  [id number]:  Pt returned from CT at this time, reports marked improvement in pain at this time.   Family at bedside will continue to monitor. BN

## 2022-08-13 NOTE — ED PROVIDER NOTE - CLINICAL SUMMARY MEDICAL DECISION MAKING FREE TEXT BOX
mid / upper abd pain rad to back assoc with HTN - will check labs, CT/CTA chest/abd, IVF, pain control, reeval

## 2022-08-13 NOTE — ED ADULT NURSE NOTE - NSIMPLEMENTINTERV_GEN_ALL_ED
Implemented All Fall Risk Interventions:  [place] to call system. Call bell, personal items and telephone within reach. Instruct patient to call for assistance. Room bathroom lighting operational. Non-slip footwear when patient is off stretcher. Physically safe environment: no spills, clutter or unnecessary equipment. Stretcher in lowest position, wheels locked, appropriate side rails in place. Provide visual cue, wrist band, yellow gown, etc. Monitor gait and stability. Monitor for mental status changes and reorient to person, place, and time. Review medications for side effects contributing to fall risk. Reinforce activity limits and safety measures with patient and family.

## 2022-08-13 NOTE — ED PROVIDER NOTE - OBJECTIVE STATEMENT
72-year-old female with a history of elevated cholesterol, borderline diabetes, status post colectomy for diverticulitis 1 month ago with Dr. Olivo presents with diffuse abdominal pain since this morning.  Patient states started having mid to upper abdominal pain earlier this a.m., with increasing pain throughout the afternoon.  Positive nausea, vomited.  No acute diarrhea.  No fevers or chills.  Pain does radiate to the back.  No acute neck pain.  No numbness/tingling/focal weakness.  No cough/URI.  No recent COVID exposures.  Patient fully vaccinated for COVID.  No aggravating/alleviating factors.  No recent trauma.  No other acute complaints.  Pain is not in chest.  No acute dyspnea.  No dyspnea on exertion/easy fatigue.  No numbness or tingling in arms or legs.  No other acute complaints.  RN noted elevated blood pressure with slight difference between left and right arm.

## 2022-08-13 NOTE — CONSULT NOTE ADULT - NS ATTEND AMEND GEN_ALL_CORE FT
Pt called earlier today with complaints of "bloat" and gas pains.  Instructed to present to ER if unresolved.  Seen in ED by Surgical PA.  Findings as documented above.  Labs and imaging personally reviewed.  Leukocytosis however remaining labs unremarkable.  CT scan personally reviewed as well.  Radiology reports reviewed.  No intra-abdominal pathology noted.  No findings to correlate with the patient's symptoms.  Possible gastritis vs enteritis?  No indication for surgical intervention or surgical admission.

## 2022-08-13 NOTE — ED PROVIDER NOTE - NSFOLLOWUPINSTRUCTIONS_ED_ALL_ED_FT
1) Follow-up with your Primary Medical Doctor or referred doctor. Call today / next business day for prompt follow-up.  2) Return to Emergency room for any worsening or persistent pain, weakness, fever, vomiting, diarrhea, unable to eat / drink, weak or dizzy, or any other concerning symptoms.  3) See attached instruction sheets for additional information, including information regarding signs and symptoms to look out for, reasons to seek immediate care and other important instructions.  4) Follow-up with Dr Olivo as discussed 1) Follow-up with your Primary Medical Doctor or referred doctor. Call today / next business day for prompt follow-up.  2) Return to Emergency room for any worsening or persistent pain, weakness, fever, vomiting, diarrhea, unable to eat / drink, weak or dizzy, or any other concerning symptoms.  3) See attached instruction sheets for additional information, including information regarding signs and symptoms to look out for, reasons to seek immediate care and other important instructions.  4) Follow-up with Dr Olivo as discussed  5) [place] diet as discussed  6) protonix once daily

## 2022-08-13 NOTE — CONSULT NOTE ADULT - ASSESSMENT
70 y/o F with PMHx HLD, COPD, s/p sigmoid colectomy 7/11/22 with Dr. Olivo, presents to ER with gas-like abdominal pain since this AM, CT scan unremarkable for acute intraabdominal pathology, possible mild gastritis, enteritis

## 2022-08-15 ENCOUNTER — APPOINTMENT (OUTPATIENT)
Dept: ORTHOPEDIC SURGERY | Facility: CLINIC | Age: 72
End: 2022-08-15

## 2022-08-16 ENCOUNTER — NON-APPOINTMENT (OUTPATIENT)
Age: 72
End: 2022-08-16

## 2022-08-23 ENCOUNTER — APPOINTMENT (OUTPATIENT)
Dept: GASTROENTEROLOGY | Facility: CLINIC | Age: 72
End: 2022-08-23

## 2022-08-23 VITALS
HEIGHT: 62 IN | DIASTOLIC BLOOD PRESSURE: 81 MMHG | SYSTOLIC BLOOD PRESSURE: 148 MMHG | TEMPERATURE: 97.5 F | BODY MASS INDEX: 26.13 KG/M2 | OXYGEN SATURATION: 98 % | HEART RATE: 89 BPM | WEIGHT: 142 LBS

## 2022-08-23 DIAGNOSIS — Z87.19 PERSONAL HISTORY OF OTHER DISEASES OF THE DIGESTIVE SYSTEM: ICD-10-CM

## 2022-08-23 PROCEDURE — 99214 OFFICE O/P EST MOD 30 MIN: CPT

## 2022-08-23 RX ORDER — POLYETHYLENE GLYCOL 3350, SODIUM CHLORIDE, SODIUM BICARBONATE AND POTASSIUM CHLORIDE WITH LEMON FLAVOR 420; 11.2; 5.72; 1.48 G/4L; G/4L; G/4L; G/4L
420 POWDER, FOR SOLUTION ORAL
Qty: 1 | Refills: 0 | Status: DISCONTINUED | COMMUNITY
Start: 2022-03-16 | End: 2022-08-23

## 2022-08-23 RX ORDER — PANTOPRAZOLE 40 MG/1
40 TABLET, DELAYED RELEASE ORAL DAILY
Qty: 30 | Refills: 5 | Status: ACTIVE | COMMUNITY
Start: 1900-01-01 | End: 1900-01-01

## 2022-08-23 RX ORDER — SODIUM SULFATE, POTASSIUM SULFATE, MAGNESIUM SULFATE 17.5; 3.13; 1.6 G/ML; G/ML; G/ML
17.5-3.13-1.6 SOLUTION, CONCENTRATE ORAL
Qty: 1 | Refills: 0 | Status: DISCONTINUED | COMMUNITY
Start: 2022-03-15 | End: 2022-08-23

## 2022-08-23 RX ORDER — SODIUM SULFATE, POTASSIUM SULFATE, MAGNESIUM SULFATE 17.5; 3.13; 1.6 G/ML; G/ML; G/ML
17.5-3.13-1.6 SOLUTION, CONCENTRATE ORAL
Qty: 1 | Refills: 0 | Status: DISCONTINUED | COMMUNITY
Start: 2022-03-16 | End: 2022-08-23

## 2022-08-23 RX ORDER — SODIUM SULFATE, POTASSIUM SULFATE, MAGNESIUM SULFATE 17.5; 3.13; 1.6 G/ML; G/ML; G/ML
17.5-3.13-1.6 SOLUTION, CONCENTRATE ORAL
Qty: 1 | Refills: 0 | Status: DISCONTINUED | COMMUNITY
Start: 2022-07-05 | End: 2022-08-23

## 2022-08-23 RX ORDER — METRONIDAZOLE 250 MG/1
250 TABLET ORAL 3 TIMES DAILY
Qty: 42 | Refills: 0 | Status: DISCONTINUED | COMMUNITY
Start: 2022-05-17 | End: 2022-08-23

## 2022-08-23 RX ORDER — POLYETHYLENE GLYCOL 3350, SODIUM CHLORIDE, SODIUM BICARBONATE AND POTASSIUM CHLORIDE WITH LEMON FLAVOR 420; 11.2; 5.72; 1.48 G/4L; G/4L; G/4L; G/4L
420 POWDER, FOR SOLUTION ORAL
Qty: 1 | Refills: 0 | Status: DISCONTINUED | COMMUNITY
Start: 2022-07-07 | End: 2022-08-23

## 2022-08-23 RX ORDER — AMOXICILLIN AND CLAVULANATE POTASSIUM 875; 125 MG/1; MG/1
875-125 TABLET, COATED ORAL
Qty: 28 | Refills: 0 | Status: DISCONTINUED | COMMUNITY
End: 2022-08-23

## 2022-08-23 RX ORDER — ALUMINUM HYDROXIDE AND MAGNESIUM CARBONATE 160; 105 MG/1; MG/1
160-105 TABLET, CHEWABLE ORAL
Qty: 30 | Refills: 5 | Status: ACTIVE | COMMUNITY
Start: 2022-08-23 | End: 1900-01-01

## 2022-08-23 RX ORDER — CEFPODOXIME PROXETIL 200 MG/1
200 TABLET, FILM COATED ORAL
Qty: 56 | Refills: 0 | Status: DISCONTINUED | COMMUNITY
Start: 2022-05-17 | End: 2022-08-23

## 2022-08-23 NOTE — REASON FOR VISIT
[FreeTextEntry1] : epigastric discomfort intermittently, recently had left hemicolectomy laparoscopically for recurrent diverticulitis, uncomplicated and did well, Iron deficiency anemia

## 2022-08-23 NOTE — CONSULT LETTER
[Dear  ___] : Dear  [unfilled], [Consult Letter:] : I had the pleasure of evaluating your patient, [unfilled]. [Please see my note below.] : Please see my note below. [Consult Closing:] : Thank you very much for allowing me to participate in the care of this patient.  If you have any questions, please do not hesitate to contact me. [Sincerely,] : Sincerely, [FreeTextEntry2] : Javad Concepcion DO\par [address] [facility]\[place] [address] \par  [FreeTextEntry3] : Pietro Choudhary MD\par

## 2022-08-23 NOTE — ASSESSMENT
[FreeTextEntry1] : Impression\par \par Recent left hemicolectomy laparoscopically for recurrent diverticulitis and doing well\par \par Occasional mild epigastric discomfort and bloating without relationship to meals or fatty foods\par \par Mild heartburn\par \par Iron deficiency anemia\par \par Suggest\par \par Trial of Gaviscon\par \par Pantoprazole\par \par Follow-up with surgeon\par \par Ultrasound of the abdomen\par \par Both upper endoscopy and colonoscopy when cleared by surgeon\par \par Call or return anytime sooner as needed\par

## 2022-08-23 NOTE — HISTORY OF PRESENT ILLNESS
[de-identified] : Javad Concepcion, DO\par [address] [facility]\[place] [address] \par \par \par Pleasant 71-year-old female with recent left hemicolectomy laparoscopically for diverticulitis\par \par Did well\par \par Having a occasional epigastric discomfort and bloating with gas\par \par Eating well\par \par Gaining weight\par \par Passing bowel movements normally without blood\par \par She will need upper endoscopy and colonoscopy when given the okay by her surgeon which I would anticipate for October\par \par Iron deficiency anemia, will need both upper endoscopy and colonoscopy and possible capsule endoscopy

## 2022-08-31 ENCOUNTER — NON-APPOINTMENT (OUTPATIENT)
Age: 72
End: 2022-08-31

## 2022-08-31 ENCOUNTER — APPOINTMENT (OUTPATIENT)
Dept: SURGERY | Facility: CLINIC | Age: 72
End: 2022-08-31

## 2022-08-31 VITALS
WEIGHT: 142 LBS | RESPIRATION RATE: 16 BRPM | OXYGEN SATURATION: 99 % | HEART RATE: 80 BPM | BODY MASS INDEX: 26.13 KG/M2 | TEMPERATURE: 98 F | HEIGHT: 62 IN | SYSTOLIC BLOOD PRESSURE: 137 MMHG | DIASTOLIC BLOOD PRESSURE: 75 MMHG

## 2022-08-31 VITALS
HEIGHT: 62 IN | RESPIRATION RATE: 16 BRPM | TEMPERATURE: 98 F | HEART RATE: 84 BPM | OXYGEN SATURATION: 99 % | WEIGHT: 142 LBS | BODY MASS INDEX: 26.13 KG/M2

## 2022-08-31 DIAGNOSIS — R10.13 EPIGASTRIC PAIN: ICD-10-CM

## 2022-08-31 DIAGNOSIS — Z87.19 PERSONAL HISTORY OF OTHER DISEASES OF THE DIGESTIVE SYSTEM: ICD-10-CM

## 2022-08-31 PROCEDURE — 99024 POSTOP FOLLOW-UP VISIT: CPT

## 2022-08-31 RX ORDER — SODIUM SULFATE, POTASSIUM SULFATE, MAGNESIUM SULFATE 17.5; 3.13; 1.6 G/ML; G/ML; G/ML
17.5-3.13-1.6 SOLUTION, CONCENTRATE ORAL
Qty: 1 | Refills: 0 | Status: ACTIVE | COMMUNITY
Start: 2022-08-31 | End: 1900-01-01

## 2022-08-31 NOTE — ASSESSMENT
[FreeTextEntry1] : 71-year-old woman status post laparoscopic left paul and sigmoid colectomy for diverticulitis.  Postoperative course has been unremarkable however recently returned to ED for epigastric pain.  No acute findings at that time.  Labs and CT scan unrevealing.  Given patient's profound anxiety, I'm suspicious for gastritis, duodenitis or PUD.\par Recommend GI follow up for EGD.  Has been started on PPI.\par \par Abdominal exam is otherwise benign.  Surgical incisions remain well approximated and healing appropriately without erythema, induration or fluctuance.  No evidence of hernia or masses.  No reproducible epigastric pain.  Negative Phillips's sign.\par \par No further surgical intervention indicated.  F/u with GI and may follow with me again upon completion of EGD if any acute findings.

## 2022-08-31 NOTE — PHYSICAL EXAM
[Normal Breath Sounds] : Normal breath sounds [Normal Heart Sounds] : normal heart sounds [Normal Rate and Rhythm] : normal rate and rhythm [No Rash or Lesion] : No rash or lesion [Alert] : alert [Oriented to Person] : oriented to person [Oriented to Place] : oriented to place [Oriented to Time] : oriented to time [Anxious] : anxious [de-identified] : Healthy appearing woman in no distress [de-identified] : ANGELITA FLOOD EOMI [de-identified] : Soft, nondistended, positive bowel sounds in all four quads.  No hernia or masses. No rebound or guarding.  Surgical incisions remain well approximated without erythema, induration or fluctuance.  No evidence of hernia or masses.  [de-identified] : Ambulating without difficulty or assistance.

## 2022-08-31 NOTE — REVIEW OF SYSTEMS
[As Noted in HPI] : as noted in HPI [Abdominal Pain] : abdominal pain [Negative] : Heme/Lymph [FreeTextEntry7] : epigastric

## 2022-08-31 NOTE — HISTORY OF PRESENT ILLNESS
[de-identified] : Status post laparoscopic sigmoid colectomy [de-identified] : ER visit for epigastric abdominal pain.  No significant findings on CT scan.\par

## 2022-09-06 DIAGNOSIS — Z20.822 ENCOUNTER FOR PREPROCEDURAL LABORATORY EXAMINATION: ICD-10-CM

## 2022-09-06 DIAGNOSIS — Z01.812 ENCOUNTER FOR PREPROCEDURAL LABORATORY EXAMINATION: ICD-10-CM

## 2022-09-15 ENCOUNTER — NON-APPOINTMENT (OUTPATIENT)
Age: 72
End: 2022-09-15

## 2022-09-29 ENCOUNTER — NON-APPOINTMENT (OUTPATIENT)
Age: 72
End: 2022-09-29

## 2022-10-10 ENCOUNTER — APPOINTMENT (OUTPATIENT)
Dept: ORTHOPEDIC SURGERY | Facility: CLINIC | Age: 72
End: 2022-10-10

## 2022-10-10 VITALS — BODY MASS INDEX: 25.16 KG/M2 | HEIGHT: 63 IN | WEIGHT: 142 LBS

## 2022-10-10 PROCEDURE — 72170 X-RAY EXAM OF PELVIS: CPT

## 2022-10-10 PROCEDURE — 72100 X-RAY EXAM L-S SPINE 2/3 VWS: CPT

## 2022-10-10 PROCEDURE — 72040 X-RAY EXAM NECK SPINE 2-3 VW: CPT

## 2022-10-10 PROCEDURE — 99214 OFFICE O/P EST MOD 30 MIN: CPT

## 2022-10-10 RX ORDER — METHOCARBAMOL 750 MG/1
750 TABLET, FILM COATED ORAL TWICE DAILY
Qty: 60 | Refills: 0 | Status: ACTIVE | COMMUNITY
Start: 2022-10-10 | End: 1900-01-01

## 2022-10-10 RX ORDER — METHYLPREDNISOLONE 4 MG/1
4 TABLET ORAL
Qty: 1 | Refills: 0 | Status: ACTIVE | COMMUNITY
Start: 2022-10-10 | End: 1900-01-01

## 2022-10-10 NOTE — PHYSICAL EXAM
[] : Pain does not radiate to right arm with motion [FreeTextEntry1] : spondylolisthesis [Disc space narrowing] : Disc space narrowing [AP] : anteroposterior [Mild arthritis (Tonnis Grade 1)] : Mild arthritis (Tonnis Grade 1) [de-identified] : radicular pain posterior left thigh [TWNoteComboBox7] : False

## 2022-10-10 NOTE — IMAGING
[Disc space narrowing] : Disc space narrowing [FreeTextEntry1] : disc space narrowing at 2-3 , and posterior facet arthropathy at L5-s1 [de-identified] : normal

## 2022-10-10 NOTE — HISTORY OF PRESENT ILLNESS
[Neck] : neck [Mid-back] : mid-back [Lower back] : lower back [Gradual] : gradual [Dull/Aching] : dull/aching [Radiating] : radiating [de-identified] : 10-10-22- she notes 4 day h/o neck and back pain. the back pain radiates into the left buttock and posterior thigh. she has been ambulating with a limp. symptoms came on after heavy household cleaning. she has not tried any meds due to gi issues.  [] : no [FreeTextEntry3] : 4 days ago  [FreeTextEntry5] : patient feels that she hurt herself while cleaning the house  [FreeTextEntry7] : down both arm

## 2022-10-12 RX ORDER — TRAMADOL HYDROCHLORIDE 50 MG/1
50 TABLET, COATED ORAL
Qty: 20 | Refills: 0 | Status: ACTIVE | COMMUNITY
Start: 2022-10-12 | End: 1900-01-01

## 2022-10-31 ENCOUNTER — APPOINTMENT (OUTPATIENT)
Dept: ORTHOPEDIC SURGERY | Facility: CLINIC | Age: 72
End: 2022-10-31

## 2022-11-02 ENCOUNTER — NON-APPOINTMENT (OUTPATIENT)
Age: 72
End: 2022-11-02

## 2022-11-03 ENCOUNTER — NON-APPOINTMENT (OUTPATIENT)
Age: 72
End: 2022-11-03

## 2022-11-03 ENCOUNTER — APPOINTMENT (OUTPATIENT)
Dept: GASTROENTEROLOGY | Facility: AMBULATORY MEDICAL SERVICES | Age: 72
End: 2022-11-03

## 2022-12-02 ENCOUNTER — APPOINTMENT (OUTPATIENT)
Dept: ORTHOPEDIC SURGERY | Facility: CLINIC | Age: 72
End: 2022-12-02

## 2022-12-02 VITALS — HEIGHT: 63 IN | WEIGHT: 142 LBS | BODY MASS INDEX: 25.16 KG/M2

## 2022-12-02 DIAGNOSIS — M47.22 OTHER SPONDYLOSIS WITH RADICULOPATHY, CERVICAL REGION: ICD-10-CM

## 2022-12-02 PROCEDURE — 99213 OFFICE O/P EST LOW 20 MIN: CPT

## 2022-12-02 RX ORDER — METHYLPREDNISOLONE 4 MG/1
4 TABLET ORAL
Qty: 1 | Refills: 0 | Status: ACTIVE | COMMUNITY
Start: 2022-12-02 | End: 1900-01-01

## 2022-12-02 NOTE — HISTORY OF PRESENT ILLNESS
[Neck] : neck [Upper back] : upper back [Left Arm] : left arm [Right Arm] : right arm [Mid-back] : mid-back [Lower back] : lower back [Gradual] : gradual [Dull/Aching] : dull/aching [Radiating] : radiating [de-identified] : 10-10-22- she notes 4 day h/o neck and back pain. the back pain radiates into the left buttock and posterior thigh. she has been ambulating with a limp. symptoms came on after heavy household cleaning. she has not tried any meds due to gi issues.  [] : no [FreeTextEntry3] : 4 days ago  [FreeTextEntry5] : patient feels that she hurt herself while cleaning the house  [FreeTextEntry7] : down both arm

## 2022-12-02 NOTE — PHYSICAL EXAM
[Disc space narrowing] : Disc space narrowing [AP] : anteroposterior [Mild arthritis (Tonnis Grade 1)] : Mild arthritis (Tonnis Grade 1) [] : Pain does not radiate to right arm with motion [FreeTextEntry8] : improved [FreeTextEntry1] : spondylolisthesis [de-identified] : radicular pain posterior left thigh

## 2022-12-06 ENCOUNTER — APPOINTMENT (OUTPATIENT)
Dept: ORTHOPEDIC SURGERY | Facility: CLINIC | Age: 72
End: 2022-12-06

## 2022-12-06 DIAGNOSIS — M54.50 LOW BACK PAIN, UNSPECIFIED: ICD-10-CM

## 2022-12-06 DIAGNOSIS — M54.16 RADICULOPATHY, LUMBAR REGION: ICD-10-CM

## 2022-12-06 PROCEDURE — 73502 X-RAY EXAM HIP UNI 2-3 VIEWS: CPT

## 2022-12-06 PROCEDURE — 99213 OFFICE O/P EST LOW 20 MIN: CPT

## 2022-12-06 PROCEDURE — 72100 X-RAY EXAM L-S SPINE 2/3 VWS: CPT

## 2022-12-06 NOTE — PHYSICAL EXAM
[4___] : right quadriceps 4[unfilled]/5 [Diminished] : patella reflex diminished [Right] : right hip [Disc space narrowing] : Disc space narrowing [Scoliosis] : Scoliosis [AP] : anteroposterior [Mild arthritis (Tonnis Grade 1)] : Mild arthritis (Tonnis Grade 1) [de-identified] : with pain to low back [] : no pelvic crest tenderness [FreeTextEntry8] : GIANNA [FreeTextEntry9] : to low back

## 2022-12-06 NOTE — DISCUSSION/SUMMARY
[de-identified] : Has MDP just prescribed which she hasn't picked up yet. Ice/heat on right low back

## 2022-12-06 NOTE — HISTORY OF PRESENT ILLNESS
[de-identified] : 72 year old F presents with R lower quadrant pain, radiates into hip, buttock and low back x 3 days. Atraumatic onset. Pain with crossing her legs, sitting.  Pain putting her leg in her pants. She is able to ambulate, dance. No loss b/b control.

## 2022-12-09 ENCOUNTER — NON-APPOINTMENT (OUTPATIENT)
Age: 72
End: 2022-12-09

## 2022-12-09 ENCOUNTER — APPOINTMENT (OUTPATIENT)
Dept: GASTROENTEROLOGY | Facility: CLINIC | Age: 72
End: 2022-12-09

## 2022-12-09 VITALS
DIASTOLIC BLOOD PRESSURE: 80 MMHG | HEART RATE: 91 BPM | OXYGEN SATURATION: 98 % | TEMPERATURE: 97.5 F | HEIGHT: 61.5 IN | WEIGHT: 136 LBS | BODY MASS INDEX: 25.35 KG/M2 | SYSTOLIC BLOOD PRESSURE: 128 MMHG

## 2022-12-09 DIAGNOSIS — K21.9 GASTRO-ESOPHAGEAL REFLUX DISEASE W/OUT ESOPHAGITIS: ICD-10-CM

## 2022-12-09 DIAGNOSIS — D50.0 IRON DEFICIENCY ANEMIA SECONDARY TO BLOOD LOSS (CHRONIC): ICD-10-CM

## 2022-12-09 DIAGNOSIS — R14.0 ABDOMINAL DISTENSION (GASEOUS): ICD-10-CM

## 2022-12-09 DIAGNOSIS — R10.31 RIGHT LOWER QUADRANT PAIN: ICD-10-CM

## 2022-12-09 DIAGNOSIS — R10.9 UNSPECIFIED ABDOMINAL PAIN: ICD-10-CM

## 2022-12-09 PROCEDURE — 99214 OFFICE O/P EST MOD 30 MIN: CPT

## 2022-12-09 RX ORDER — SODIUM SULFATE, POTASSIUM SULFATE AND MAGNESIUM SULFATE 1.6; 3.13; 17.5 G/177ML; G/177ML; G/177ML
17.5-3.13-1.6 SOLUTION ORAL
Qty: 1 | Refills: 0 | Status: ACTIVE | COMMUNITY
Start: 2022-12-09 | End: 1900-01-01

## 2022-12-09 NOTE — REASON FOR VISIT
[Follow-up] : a follow-up of an existing diagnosis [FreeTextEntry1] : Abdominal discomfort, more now in the right lower quadrant with radiation to the back, no fever or chills, iron deficiency anemia not tolerating oral iron

## 2022-12-09 NOTE — ASSESSMENT
[FreeTextEntry1] : Impression\par \par Right lower quadrant discomfort with radiation to the back\par \par Iron deficiency anemia not tolerating oral iron\par \par Suggested\par \par CAT scan of abdomen pelvis with IV and oral contrast\par \par Hematology consult for possible IV iron infusion\par \par Both upper endoscopy and colonoscopy with me\par \par Anesthesia clearance\par \par Pulmonary clearance for mild COPD\par \par Suprep\par \par Risks/benefits:\par The procedure, the risks and benefits and alternatives have been reviewed in great detail with the patient.  Risks including, but not limited to sedation such as cardiac and pulmonary compromise, the procedure itself such as bleeding requiring hospitalization, transfusion, surgery, temporary or permanent colostomy.  Perforation or puncture of the requiring hospitalization, surgery, temporary colostomy.\par It has been explained to the patient that though colonoscopy is thought to be the best screening exam for colon cancer and polyps, no screening exam can find all colon polyps or cancers.  \par The patient expresses understanding of the procedure and consents to undergoing the procedure.\par \par

## 2022-12-09 NOTE — HISTORY OF PRESENT ILLNESS
[FreeTextEntry1] :  Javad Concepcion, DO\par [address] [facility]\[place] [address] \par \par Pleasant 72-year-old female\par \par Had been doing fairly well\par \par Now over the last several weeks she has has abdominal discomfort more located in the right lower quadrant with some radiation to the back\par \par No fever or chills\par \par No relationship to eating, bowel movements or activity\par \par She is noted to be iron deficient\par \par She never had opportunity to schedule upper endoscopy and colonoscopy\par \par She had COVID in September but has recovered and is doing well\par \par She does not tolerate oral iron

## 2022-12-09 NOTE — PHYSICAL EXAM
[Alert] : alert [Normal Voice/Communication] : normal voice/communication [Healthy Appearing] : healthy appearing [No Acute Distress] : no acute distress [Sclera] : the sclera and conjunctiva were normal [Hearing Threshold Finger Rub Not Powell] : hearing was normal [Oropharynx] : the oropharynx was normal [Normal Appearance] : the appearance of the neck was normal [No Neck Mass] : no neck mass was observed [No Respiratory Distress] : no respiratory distress [No Acc Muscle Use] : no accessory muscle use [Respiration, Rhythm And Depth] : normal respiratory rhythm and effort [Heart Rate And Rhythm] : heart rate was normal and rhythm regular [None] : no edema [Bowel Sounds] : normal bowel sounds [Abdomen Tenderness] : non-tender [No Masses] : no abdominal mass palpated [Abdomen Soft] : soft [] : no hepatosplenomegaly [Cervical Lymph Nodes Enlarged Posterior Bilaterally] : no posterior cervical lymphadenopathy [No CVA Tenderness] : no CVA  tenderness [No Spinal Tenderness] : no spinal tenderness [Abnormal Walk] : normal gait [Normal Color / Pigmentation] : normal skin color and pigmentation [No Focal Deficits] : no focal deficits [Oriented To Time, Place, And Person] : oriented to person, place, and time

## 2022-12-09 NOTE — CONSULT LETTER
[Dear  ___] : Dear  [unfilled], [Consult Letter:] : I had the pleasure of evaluating your patient, [unfilled]. [Please see my note below.] : Please see my note below. [Consult Closing:] : Thank you very much for allowing me to participate in the care of this patient.  If you have any questions, please do not hesitate to contact me. [Sincerely,] : Sincerely, [FreeTextEntry2] :  Javad Concepcion DO\par [address] [facility]\[place] [address] \par \par  [FreeTextEntry3] : Pietro Choudhary MD\par

## 2022-12-13 ENCOUNTER — NON-APPOINTMENT (OUTPATIENT)
Age: 72
End: 2022-12-13

## 2022-12-22 ENCOUNTER — APPOINTMENT (OUTPATIENT)
Dept: UROLOGY | Facility: CLINIC | Age: 72
End: 2022-12-22

## 2022-12-22 VITALS
DIASTOLIC BLOOD PRESSURE: 81 MMHG | SYSTOLIC BLOOD PRESSURE: 152 MMHG | WEIGHT: 134 LBS | HEIGHT: 65 IN | BODY MASS INDEX: 22.33 KG/M2 | RESPIRATION RATE: 14 BRPM | TEMPERATURE: 97.3 F | OXYGEN SATURATION: 97 % | HEART RATE: 88 BPM

## 2022-12-22 PROCEDURE — 99203 OFFICE O/P NEW LOW 30 MIN: CPT

## 2022-12-23 NOTE — ASSESSMENT
[FreeTextEntry1] : Ms. YARBROUGH is a 72 year  White  F who comes today to clinic for LLQ pain and concern for kidney stones. \par CT scan from [facility] with no identifiable kidney stones (see scanned report).\par discussed with the patient the absence of a kidney stone and the very low likelihood that the pain is from genitourinary source. I recommended the patient to see a rheumatologist given he roint pains.

## 2022-12-23 NOTE — HISTORY OF PRESENT ILLNESS
[FreeTextEntry1] : Ms. YARBROUGH is a 72 year  White  F who comes today to clinic for LLQ pain and concern for kidney stones. Denies urinary symptoms. Denies hematuria, fever chills. \par Pain started about 2 weeks ago.\par \par CT scan from [facility] with no identifiable kidney stones (see scanned report).

## 2023-01-03 DIAGNOSIS — R89.5 ABNORMAL MICROBIOLOGICAL FINDINGS IN SPECIMENS FROM OTHER ORGANS, SYSTEMS AND TISSUES: ICD-10-CM

## 2023-01-03 RX ORDER — CEFUROXIME AXETIL 500 MG/1
500 TABLET ORAL
Qty: 14 | Refills: 0 | Status: ACTIVE | COMMUNITY
Start: 2023-01-03 | End: 1900-01-01

## 2023-01-05 LAB
APPEARANCE: CLEAR
BACTERIA UR CULT: ABNORMAL
BACTERIA: NEGATIVE
BILIRUBIN URINE: NEGATIVE
BLOOD URINE: NEGATIVE
COLOR: COLORLESS
GLUCOSE QUALITATIVE U: NEGATIVE
HYALINE CASTS: 0 /LPF
KETONES URINE: NEGATIVE
LEUKOCYTE ESTERASE URINE: NEGATIVE
MICROSCOPIC-UA: NORMAL
NITRITE URINE: NEGATIVE
PH URINE: 7
PROTEIN URINE: NEGATIVE
RED BLOOD CELLS URINE: 0 /HPF
SPECIFIC GRAVITY URINE: 1
SQUAMOUS EPITHELIAL CELLS: 0 /HPF
URINE CYTOLOGY: NORMAL
UROBILINOGEN URINE: NORMAL
WHITE BLOOD CELLS URINE: 0 /HPF

## 2023-01-30 ENCOUNTER — APPOINTMENT (OUTPATIENT)
Dept: GASTROENTEROLOGY | Facility: AMBULATORY MEDICAL SERVICES | Age: 73
End: 2023-01-30

## 2023-03-09 ENCOUNTER — APPOINTMENT (OUTPATIENT)
Dept: GASTROENTEROLOGY | Facility: AMBULATORY MEDICAL SERVICES | Age: 73
End: 2023-03-09

## 2023-03-20 ENCOUNTER — APPOINTMENT (OUTPATIENT)
Dept: ORTHOPEDIC SURGERY | Facility: CLINIC | Age: 73
End: 2023-03-20

## 2023-03-22 ENCOUNTER — NON-APPOINTMENT (OUTPATIENT)
Age: 73
End: 2023-03-22

## 2023-03-24 ENCOUNTER — NON-APPOINTMENT (OUTPATIENT)
Age: 73
End: 2023-03-24

## 2023-03-27 ENCOUNTER — APPOINTMENT (OUTPATIENT)
Dept: ORTHOPEDIC SURGERY | Facility: CLINIC | Age: 73
End: 2023-03-27
Payer: MEDICARE

## 2023-03-27 VITALS — HEIGHT: 65 IN | BODY MASS INDEX: 22.33 KG/M2 | WEIGHT: 134 LBS

## 2023-03-27 PROCEDURE — 72070 X-RAY EXAM THORAC SPINE 2VWS: CPT

## 2023-03-27 PROCEDURE — 99213 OFFICE O/P EST LOW 20 MIN: CPT

## 2023-03-27 RX ORDER — METHYLPREDNISOLONE 4 MG/1
4 TABLET ORAL
Qty: 1 | Refills: 0 | Status: ACTIVE | COMMUNITY
Start: 2023-03-27 | End: 1900-01-01

## 2023-03-27 RX ORDER — CYCLOBENZAPRINE HYDROCHLORIDE 10 MG/1
10 TABLET, FILM COATED ORAL 3 TIMES DAILY
Qty: 60 | Refills: 2 | Status: ACTIVE | COMMUNITY
Start: 2023-03-27 | End: 1900-01-01

## 2023-03-27 NOTE — HISTORY OF PRESENT ILLNESS
[de-identified] : 3/27/23- Developed pain beneath shoulder blades right and left after traveling to [facility]. Took cyclobenzaprine which helped. No shortness of breath\par \par 12/06/23:72 year old F presents with R lower quadrant pain, radiates into hip, buttock and low back x 3 days. Atraumatic onset. Pain with crossing her legs, sitting.  Pain putting her leg in her pants. She is able to ambulate, dance. No loss b/b control.  [] : no

## 2023-03-27 NOTE — PHYSICAL EXAM
[] : achilles and patella reflexes are symmetrical [Bilateral] : rib bilaterally [Facet arthropathy] : Facet arthropathy [Disc space narrowing] : Disc space narrowing

## 2023-05-08 NOTE — CONSULT NOTE ADULT - NS ATTEND BILL GEN_ALL_CORE
Attending to bill No Repair - Repaired With Adjacent Surgical Defect Text (Leave Blank If You Do Not Want): After obtaining clear surgical margins the defect was repaired concurrently with another surgical defect which was in close approximation.

## 2023-05-09 NOTE — REASON FOR VISIT
Over the counter pain creams: Voltaren Gel, Biofreeze, Bengay, tiger balm, two old goat, lidocaine gel,  Absorbine Veterinary Liniment Gel Topical Analgesic Sore Muscle and Joint Pain Relief    Recommend lotions: eucerin, eucerin for diabetics, aquaphor, A&D ointment, gold bond for diabetics, sween, [place]'s Bees all purpose baby ointment,  urea 40 with aloe (found on amazon.com)    Shoe recommendations: (try 6pm.com, zappos.com , nordstromrack.[email or web address], or shoes.com for discounted prices) you can visit DSW shoes in [place]  or [employer] [facility] in the [facility] (there are also several shoe brand outlets in the [facility])    Asics (GT 2000 or gel foundations), new balance stability type shoes (such as the 940 series), saucony (stabil c3),  Alamo (GTS or Beast or transcend), propet, HokaOne (tennis shoe)    Francesco Eric (women) Cherry&Moise (men), clarks, crocs, aerosoles, naturalizers, SAS, ecco, born, meghana hernandez, rockports (dress shoes)    Vionic, burkenstocks, fitflops, propet (sandals)  HokaOne sandals, crocs, propet (house shoes)    Nail Home remedy:  Vicks Vapor rub or Emuaid to nails for easier manageability    Diabetes: Inspecting Your Feet  Diabetes increases your chances of developing foot problems. So inspect your feet every day. This helps you find small skin irritations before they become serious infections. If you have trouble seeing the bottoms of your feet, use a mirror or ask a family member or friend to help.     Pressure spots on the bottom of the foot are common areas where problems develop.   How to check your feet  Below are tips to help you look for foot problems. Try to check your feet at the same time each day, such as when you get out of bed in the morning:  Check the top of each foot. The tops of toes, back of the heel, and outer edge of the foot can get a lot of rubbing from poor-fitting shoes.  Check the bottom of each foot. Daily wear and tear often leads to problems at pressure spots.  Check  the toes and nails. Fungal infections often occur between toes. Toenail problems can also be a sign of fungal infections or lead to breaks in the skin.  Check your shoes, too. Loose objects inside a shoe can injure the foot. Use your hand to feel inside your shoes for things like néstor, loose stitching, or rough areas that could irritate your skin.  Warning signs  Look for any color changes in the foot. Redness with streaks can signal a severe infection, which needs immediate medical attention. Tell your doctor right away if you have any of these problems:  Swelling, sometimes with color changes, may be a sign of poor blood flow or infection. Symptoms include tenderness and an increase in the size of your foot.  Warm or hot areas on your feet may be signs of infection. A foot that is cold may not be getting enough blood.  Sensations such as burning, tingling, or pins and needles can be signs of a problem. Also check for areas that may be numb.  Hot spots are caused by friction or pressure. Look for hot spots in areas that get a lot of rubbing. Hot spots can turn into blisters, calluses, or sores.  Cracks and sores are caused by dry or irritated skin. They are a sign that the skin is breaking down, which can lead to infection.  Toenail problems to watch for include nails growing into the skin (ingrown toenail) and causing redness or pain. Thick, yellow, or discolored nails can signal a fungal infection.  Drainage and odor can develop from untreated sores and ulcers. Call your doctor right away if you notice white or yellow drainage, bleeding, or unpleasant odor.   © 2292-0253 [employer]. [address] [place] [address]. All rights reserved. This information is not intended as a substitute for professional medical care. Always follow your healthcare professional's instructions.        Step-by-Step:  Inspecting Your Feet (Diabetes)    Date Last Reviewed: 10/1/2016  © 7843-8211 [employer]. [address] [place] [address], [place] [address]. All rights reserved. This information is not intended as a substitute for professional medical care. Always follow your healthcare professional's instructions.           [Follow-Up: _____] : a [unfilled] follow-up visit

## 2023-10-26 ENCOUNTER — NON-APPOINTMENT (OUTPATIENT)
Age: 73
End: 2023-10-26

## 2023-11-06 ENCOUNTER — APPOINTMENT (OUTPATIENT)
Dept: ORTHOPEDIC SURGERY | Facility: CLINIC | Age: 73
End: 2023-11-06
Payer: MEDICARE

## 2023-11-06 VITALS — HEIGHT: 65 IN | BODY MASS INDEX: 22.33 KG/M2 | WEIGHT: 134 LBS

## 2023-11-06 DIAGNOSIS — M19.049 PRIMARY OSTEOARTHRITIS, UNSPECIFIED HAND: ICD-10-CM

## 2023-11-06 PROCEDURE — 20605 DRAIN/INJ JOINT/BURSA W/O US: CPT | Mod: RT

## 2023-11-06 PROCEDURE — 73130 X-RAY EXAM OF HAND: CPT | Mod: 50

## 2023-11-06 PROCEDURE — 99213 OFFICE O/P EST LOW 20 MIN: CPT | Mod: 25

## 2023-11-06 RX ORDER — METHYLPREDNISOLONE 4 MG/1
4 TABLET ORAL
Qty: 1 | Refills: 0 | Status: ACTIVE | COMMUNITY
Start: 2023-11-06 | End: 1900-01-01

## 2023-12-19 ENCOUNTER — APPOINTMENT (OUTPATIENT)
Dept: ORTHOPEDIC SURGERY | Facility: CLINIC | Age: 73
End: 2023-12-19
Payer: MEDICARE

## 2023-12-19 VITALS — BODY MASS INDEX: 22.33 KG/M2 | WEIGHT: 134 LBS | HEIGHT: 65 IN

## 2023-12-19 DIAGNOSIS — M51.34 OTHER INTERVERTEBRAL DISC DEGENERATION, THORACIC REGION: ICD-10-CM

## 2023-12-19 DIAGNOSIS — M54.6 PAIN IN THORACIC SPINE: ICD-10-CM

## 2023-12-19 PROCEDURE — 99213 OFFICE O/P EST LOW 20 MIN: CPT

## 2023-12-19 PROCEDURE — 72070 X-RAY EXAM THORAC SPINE 2VWS: CPT

## 2023-12-19 RX ORDER — METHOCARBAMOL 750 MG/1
750 TABLET, FILM COATED ORAL
Qty: 30 | Refills: 2 | Status: ACTIVE | COMMUNITY
Start: 2023-12-19 | End: 1900-01-01

## 2023-12-19 RX ORDER — IBUPROFEN 600 MG/1
600 TABLET, FILM COATED ORAL 3 TIMES DAILY
Qty: 60 | Refills: 1 | Status: ACTIVE | COMMUNITY
Start: 2023-12-19 | End: 1900-01-01

## 2023-12-19 NOTE — HISTORY OF PRESENT ILLNESS
[5] : 5 [Dull/Aching] : dull/aching [Sharp] : sharp [Intermittent] : intermittent [Nothing helps with pain getting better] : Nothing helps with pain getting better [de-identified] :  11/06/2023: saw her in year's past dx with right cmc oa. states recently pain in b/l more limiting  [] : no [FreeTextEntry1] : B/L hands [FreeTextEntry5] : B/L hand pain Hx of Ganglion cyst according to Pt. Pt denies injury, denies N.T. Pain over the 1st CMC

## 2023-12-19 NOTE — PHYSICAL EXAM
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

## 2023-12-19 NOTE — REASON FOR VISIT
[FreeTextEntry2] : 12/19/2023 Has mid back pain past few days after doing cleaning at home. Had similar issues over 6 months ago, but the pain went away on its own

## 2023-12-19 NOTE — PROCEDURE
[Medium Joint Injection] : medium joint injection [Right] : of the right [CMC Joint] : CMC joint [Pain] : pain [Alcohol] : alcohol [Betadine] : betadine [Ethyl Chloride sprayed topically] : ethyl chloride sprayed topically [Sterile technique used] : sterile technique used [___ cc    3mg] :  Betamethasone (Celestone) ~Vcc of 3mg [___ cc    1%] : Lidocaine ~Vcc of 1%

## 2023-12-19 NOTE — ASSESSMENT
[FreeTextEntry1] : Having exacerbation of thoracic degenerative discs  Prescribed Ibuprofen and Methocarbamol PT

## 2023-12-20 RX ORDER — METHYLPREDNISOLONE 4 MG/1
4 TABLET ORAL
Qty: 1 | Refills: 1 | Status: ACTIVE | COMMUNITY
Start: 2023-12-20 | End: 1900-01-01

## 2024-01-01 NOTE — CONSULT NOTE ADULT - NS ATTEND AMEND GEN_ALL_CORE FT
EKG normal and no sign of acute ischemia or volume overload.  no worrisome cardiac symptoms at this time.  no cardiac contraindication to proceeding with lap colectomy on Monday. Routine cardiac monitoring is recommended.
Statement Selected

## 2024-01-01 NOTE — ED ADULT NURSE NOTE - IS THE PATIENT ABLE TO BE SCREENED?
Critical Care/Pulmonary  Daily Progress Note        Subjective    Interval History:    No acute events overnight.   Tolerating PS, however lethargic and not following commands   Tmax 101.1 this morning   Remains significantly fluid positive, will d/c bicarb gtt and give dose of lasix today.   Family updated at bedside.     I&O 24hr: (+) 1310mL         Adm: (+) 9704mL  UO: 1325mL     Objective       Allergies: Azithromycin, Prednisone, and Lorazepam    CURRENT MEDS  •  acetaminophen, 650 mg, feeding tube, q4h PRN  •  albuterol, 2.5 mg, nebulization, q4h PRN  •  amiodarone, 200 mg, feeding tube, Daily  •  atorvastatin, 10 mg, oral, Nightly  •  atropine, 1 mg, intravenous, Once PRN  •  betamethasone valerate, , Topical, 2x daily PRN  •  calcium gluconate, 1 g, intravenous, q6h PRN  •  cetirizine, 10 mg, feeding tube, Nightly  •  chlorhexidine, 15 mL, Mouth/Throat, 2 times per day  •  cholecalciferol (vitamin D3), 1,000 Units, feeding tube, Daily  •  dexmedetomidine in 0.9 % NaCl, 0-1.5 mcg/kg/hr, intravenous, Titrated  •  glucose, 16-32 g of dextrose, oral, PRN **OR** dextrose, 15-30 g of dextrose, oral, PRN **OR** glucagon, 1 mg, intramuscular, PRN **OR** dextrose 50 % in water (D50), 25 mL, intravenous, PRN  •  [Provider Managed Hold] furosemide, 40 mg, intravenous, q12h GISELLE  •  guaiFENesin, 400 mg, oral, q6h GISELLE  •  heparin (porcine), 40-80 Units/kg (Adjusted), intravenous, q6h PRN  •  heparin infusion - MAR calculator by PTT, 100-4,000 Units/hr, intravenous, Titrated  •  hydrALAZINE, 5 mg, intravenous, q6h PRN  •  insulin lispro U-100, 1-10 Units, subcutaneous, q6h GISELLE  •  ipratropium HFA, 2 puff, inhalation, QID (8a, 12p, 4p, 8p)  •  magnesium sulfate, 2 g, intravenous, PRN  •  metoclopramide, 10 mg, intravenous, q6h PRN  •  metoprolol tartrate, 25 mg, feeding tube, BID  •  pantoprazole, 40 mg, intravenous, q12h GISELLE  •  potassium chloride, 20 mEq, oral, PRN  •  potassium chloride, 40 mEq, oral, PRN  •   potassium chloride, 20 mEq, intravenous, PRN  •  potassium chloride in water, 20 mEq, intravenous, PRN **AND** potassium chloride in water, 20 mEq, intravenous, PRN  •  silver sulfadiazine, , Topical, BID  •  sodium chloride, 10 mL, intravenous, q8h INT  •  sodium chloride, 10 mL, intravenous, PRN  •  tamoxifen, 20 mg, feeding tube, Daily    VITAL SIGNS  Vitals:    01/01/24 0842 01/01/24 0843 01/01/24 0850 01/01/24 0900   BP:  (!) 144/47 (!) 147/50    Pulse:  (!) 57 61 62   Resp:   18 13   Temp: (!) 38.4 °C (101.1 °F)      TempSrc:       SpO2:   100% 99%   Weight:       Height:           VENTILATOR SETTINGS  Vent Mode: Pressure support  FiO2 (%) (Set):  [40 %] 40 %  S RR:  [18] 18  S VT:  [450 mL] 450 mL  PEEP/CPAP (Set, cmH2O):  [6 cm H20] 6 cm H20  MAP (Observed, cmH2O):  [8-12] 9.6    Oxygen:  Oxygen Therapy: Supplemental oxygen  O2 Delivery Method: Endotracheal tube  FiO2 (%) (Set): 40 %  O2 Flow Rate (L/min): 6 L/min     INTAKE/OUTPUT    Intake/Output Summary (Last 24 hours) at 1/1/2024 1001  Last data filed at 1/1/2024 0900  Gross per 24 hour   Intake 2467.36 ml   Output 1225 ml   Net 1242.36 ml       Lines, Drains, Airways, Wounds:  Peripheral IV (Adult) 12/25/23 Anterior;Right Hand (Active)   Number of days: 2       Peripheral IV (Adult) 12/26/23 Right Antecubital (Active)   Number of days: 1       Peripheral IV (Adult) 11/26/23 Left;Posterior Forearm (Active)   Number of days: 31       NG/OG Tube 12/25/23 Left nostril (Active)   Number of days: 2       Urethral Catheter Temperature probe 16 Fr (Active)   Number of days: 3       ETT  (Active)   Number of days: 1       Wound Other (comment) Right Pretibial (Active)   Number of days: 3       Wound Venous Ulcer Left Pretibial (Active)   Number of days: 3       Wound Perineum (Active)   Number of days: 3       Catheterization Site - Arterial Right Femoral 6 Fr. (Active)   Number of days: 2       Catheterization Site - Venous Right Femoral 6 Fr. (Active)   Number  of days: 2         Physical Exam:  Physical Exam  Vitals and nursing note reviewed.   Constitutional:       General: He is not in acute distress.     Appearance: He is ill-appearing.      Interventions: He is sedated, intubated and restrained.   HENT:      Head: Normocephalic and atraumatic.      Nose:      Comments: NGT     Mouth/Throat:      Mouth: Mucous membranes are moist.      Comments: ETT  Eyes:      General: No scleral icterus.     Pupils: Pupils are equal, round, and reactive to light.   Cardiovascular:      Rate and Rhythm: Normal rate. Rhythm irregular.      Heart sounds: Normal heart sounds, S1 normal and S2 normal. No murmur heard.  Pulmonary:      Effort: No respiratory distress. He is intubated.      Breath sounds: No wheezing, rhonchi or rales.      Comments: Mechanical breath sounds, coarse bilaterally  Abdominal:      General: Bowel sounds are normal. There is no distension.      Palpations: Abdomen is soft.      Tenderness: There is no abdominal tenderness.   Genitourinary:     Comments: Lloyd/FMS  Musculoskeletal:      Right lower leg: No edema.      Left lower leg: No edema.   Skin:     General: Skin is warm and dry.   Neurological:      Mental Status: He is lethargic.      Comments: Not following commands           Labs:  See Labs Below:  ABG  Results from last 7 days   Lab Units 12/30/23  2240 12/30/23  1520 12/30/23  1353   PH ART pH 7.41 7.30* 7.30*   PCO2 ART mm Hg 36 44 48   PO2 ART mm Hg 164* 83 102*   HCO3 ART mEQ/L 24 21 23   O2 SAT ART % 98 94 97   BASE EXC ART mEQ/L -1.6 -4.7 -3.0     CBC  Results from last 7 days   Lab Units 01/01/24  0517 12/31/23  0545 12/30/23  0355   WBC K/uL 3.60* 4.72 4.64   RBC M/uL 2.31* 2.50* 2.46*   HEMOGLOBIN g/dL 7.6* 8.3* 8.4*   HEMATOCRIT % 24.7* 26.2* 25.6*   MCV fL 106.9* 104.8* 104.1*   MCH pg 32.9 33.2 34.1*   MCHC g/dL 30.8* 31.7* 32.8   PLATELETS K/uL 104* 114* 180   RDW % 16.3* 16.2* 16.1*   MPV fL 11.7 11.4 12.3     BMP  Results from last 7  days   Lab Units 01/01/24  0517 12/31/23  0545 12/30/23  0355   SODIUM mEQ/L 145 148* 146*   POTASSIUM mEQ/L 3.9 3.8 4.9   CHLORIDE mEQ/L 115* 116* 115*   CO2 mEQ/L 22 21 23   BUN mg/dL 49* 57* 55*   CREATININE mg/dL 1.6* 1.8* 2.0*   CALCIUM mg/dL 7.8* 8.0* 7.5*   ALBUMIN g/dL 2.2* 2.5* 2.4*   BILIRUBIN TOTAL mg/dL 0.5 0.6 0.7   ALK PHOS IU/L 55 57 54   ALT IU/L 134* 201* 298*   AST IU/L 52* 76* 184*   GLUCOSE mg/dL 177* 168* 250*     Coag  Results from last 7 days   Lab Units 01/01/24  0517 12/31/23  0545 12/30/23  0355 12/28/23  2346 12/28/23  1659   PROTIME sec  --   --   --   --  13.7   INR   --   --   --   --  1.1   PTT sec 79* 100* 87*   < > 31    < > = values in this interval not displayed.       Imaging:   Imaging personally reviewed  X-RAY ABDOMEN 1 VIEW    Result Date: 12/30/2023  CLINICAL HISTORY: placement of enteral feeding tube with weighted end COMMENT: AP frontal view(s) of the abdomen. No recent comparison exam. The feeding tube tip extends to level of the proximal stomach.  Imaged bowel gas pattern is nonobstructive.  Partially imaged is prosthetic cardiac valve.     IMPRESSION: Weighted feeding tube tip at the level of the proximal stomach.    CT HEAD WITHOUT IV CONTRAST    Result Date: 12/30/2023  CLINICAL HISTORY: Encephalopathy status post V. fib arrest CT DOSE:  One or more dose reduction techniques (e.g. automated exposure control, adjustment of the mA and/or kV according to patient size, use of iterative reconstruction technique) utilized for this examination. COMPARISON STUDIES:  04/27/2023 COMMENT: Noncontrast CT of the brain was performed. There is no evidence of intracranial mass or hemorrhage. No edema is seen.  No extra-axial collections are seen. There is age appropriate involutional  change.  There is periventricular hypodensity representing  small vessel ischemic changes. There does appear to be preservation of gray-white differentiation. No acute territorial infarct. The bony  calvarium is intact. Mucosal thickening within the maxillary, ethmoid and sphenoid sinuses.     IMPRESSION: No acute intracranial abnormality.  Chronic ischemic white matter changes are noted.    X-RAY CHEST 1 VIEW    Result Date: 12/30/2023  CLINICAL HISTORY: ETT tube placement COMMENT:  AP semierect view of the chest is compared the x-ray from 12/27/2023. The endotracheal tube appears to been repositioned with the tip approximately 2 cm above the leo and situated lower position since the prior study. Right central venous catheter is in place with the tip in the area of the superior vena cava. The heart is stable in size. Small pleural effusions.     IMPRESSION: 1. The endotracheal tube is lower in position situated 2 cm above level of the leo.    IR TUNNELED PICC PLACEMENT    Result Date: 12/29/2023  CLINICAL HISTORY: 72-year-old male with pneumonia. COMMENT: COMPARISON: None. PROCEDURE: Tunneled Catheter Placement Fluoro Time: 0.1 minutes Number of Images: 2 Reference Air kerma: 0.56 mGy Contrast: None PROCEDURE PERFORMED: 1. Vascular ultrasound examination of the neck. 2. Ultrasound and fluoroscopically-guided 5 Fr triple lumen tunneled PICC placement. 3. Final fluoroscopic image confirming placement. ANESTHESIA: 1% lidocaine subcutaneously. DESCRIPTION OF PROCEDURE: The risks, benefits and alternatives of the procedure were explained to the patient and informed consent was obtained. Sonographic evaluation of the neck was performed to determine patency of the jugular veins. Ultrasound image was permanently archived.  The right neck and chest were then prepped and draped in the usual sterile fashion. Following the achievement of local anesthesia with 1% lidocaine subcutaneously, a small neck incision was made with a #11 blade. Uneventful access into the right internal jugular vein was then obtained under direct, real-time sonographic guidance through the neck incision with a micropuncture needle. A 0.018  inch microwire was threaded through the needle under direct fluoroscopic guidance into the superior vena cava.  A peel-away sheath was advanced over the wire. A right chest wall exit site was then marked and local anesthesia was then achieved along the planned subcutaneous tunnel, extending to the neck incision. A dermatotomy was then made at the chest wall exit site with a #11 blade and the catheter was then tunneled subcutaneously to the neck incision utilizing a tunneling device. The appropriate catheter length was determined fluoroscopically. The guidewire and dilator were removed and the catheter tip was placed into the high right atrium via the peel-away sheath which was subsequently removed. The catheter was flushed with heparinized saline. Sterile dressings were applied to the neck and chest. The patient tolerated the procedure well. No immediate complications were identified. No retained wire was noted on the final fluoroscopic images. DESCRIPTION OF FINDINGS: Sonographic examination of the neck demonstrates patency of the right internal jugular vein. Final fluoroscopic images depict satisfactory position of the catheter.     IMPRESSION: Successful placement of 5 Fr triple lumen tunneled PICC.     ULTRASOUND GUIDED VASCULAR ACCESS    Result Date: 12/29/2023  CLINICAL HISTORY: 72-year-old male with pneumonia. COMMENT: COMPARISON: None. PROCEDURE: Tunneled Catheter Placement Fluoro Time: 0.1 minutes Number of Images: 2 Reference Air kerma: 0.56 mGy Contrast: None PROCEDURE PERFORMED: 1. Vascular ultrasound examination of the neck. 2. Ultrasound and fluoroscopically-guided 5 Fr triple lumen tunneled PICC placement. 3. Final fluoroscopic image confirming placement. ANESTHESIA: 1% lidocaine subcutaneously. DESCRIPTION OF PROCEDURE: The risks, benefits and alternatives of the procedure were explained to the patient and informed consent was obtained. Sonographic evaluation of the neck was performed to determine  patency of the jugular veins. Ultrasound image was permanently archived.  The right neck and chest were then prepped and draped in the usual sterile fashion. Following the achievement of local anesthesia with 1% lidocaine subcutaneously, a small neck incision was made with a #11 blade. Uneventful access into the right internal jugular vein was then obtained under direct, real-time sonographic guidance through the neck incision with a micropuncture needle. A 0.018 inch microwire was threaded through the needle under direct fluoroscopic guidance into the superior vena cava.  A peel-away sheath was advanced over the wire. A right chest wall exit site was then marked and local anesthesia was then achieved along the planned subcutaneous tunnel, extending to the neck incision. A dermatotomy was then made at the chest wall exit site with a #11 blade and the catheter was then tunneled subcutaneously to the neck incision utilizing a tunneling device. The appropriate catheter length was determined fluoroscopically. The guidewire and dilator were removed and the catheter tip was placed into the high right atrium via the peel-away sheath which was subsequently removed. The catheter was flushed with heparinized saline. Sterile dressings were applied to the neck and chest. The patient tolerated the procedure well. No immediate complications were identified. No retained wire was noted on the final fluoroscopic images. DESCRIPTION OF FINDINGS: Sonographic examination of the neck demonstrates patency of the right internal jugular vein. Final fluoroscopic images depict satisfactory position of the catheter.     IMPRESSION: Successful placement of 5 Fr triple lumen tunneled PICC.     ULTRASOUND KIDNEYS BLADDER/NO POST VOID    Result Date: 12/28/2023  CLINICAL HISTORY: Kidney failure, acute hx GH, confirm no clot in bladder Comparison: PET/CT dated 03/06/23.     IMPRESSION: 1. No sonographic abnormality in the kidneys. 2. Nearly  completely collapsed bladder as discussed below. No large intraluminal bladder thrombus/clot as clinically questioned (given the limitations of near complete bladder collapse). COMMENT: Real-time sonographic evaluation of the kidneys and bladder was performed. There is normal cortical echogenicity and corticomedullary differentiation. The right kidney measures 10.8 x 5.2 x 4.6 cm. The left kidney measures 10.8 x 5.8 x 4.3 cm. There is no hydronephrosis, calculi, masses or perinephric collections. Bladder is nearly completely collapsed, measuring approximately 2.5 x 1.3 x 2.5 cm, for a calculated volume of approximately 5.5 cc, precluding adequate evaluation for calculi or mass. No ureteral jets identified on color Doppler, perhaps due to renal dysfunction and/or hydration status.    X-RAY CHEST 1 VIEW    Result Date: 12/27/2023  CLINICAL HISTORY:  VDRF     IMPRESSION:  Reduced lung volumes. No acute cardiopulmonary process. Stable cardiac enlargement. COMPARISON: Portable chest studies 12/25 and 12/26/2023. COMMENT:  Upright portable imaging completed 0548 hours. Endotracheal tube 4.7 cm above. Enteric tube follows a normal course into left upper quadrant, directed to the left. Mild stable cardiac enlargement. Mitral annular prosthesis again noted. Stable hilar and mediastinal contours. Reduced lung volumes. No definite consolidation or pleural fluid. Unremarkable upper abdomen.    X-RAY CHEST 1 VIEW    Result Date: 12/26/2023  CLINICAL HISTORY: Endotracheal tube repositioning     IMPRESSION: Endotracheal tube has been repositioned; the tip is now approximately 3.3 cm above the leo. COMMENT: A semierect AP portable view the chest was performed at 1615 and is compared to a prior study from 1503. Since the prior study, the endotracheal tube has been repositioned and the tip is now approximately 3.3 cm above the leo. There has been no other significant interval change.    X-RAY CHEST 1 VIEW    Result Date:  12/26/2023  CLINICAL HISTORY: 72-year-old, post intubation TECHNIQUE: 1 view of the chest acquired. COMPARISON: 12/26/2023 COMMENT: LINES: ET tube 2 cm above leo. NG tube tip in proximal stomach. LUNGS: Low lung volumes with interstitial crowding. Hazy opacities in right lower lung, probable atelectasis. Previously visualized right upper lung opacities improved. MEDIASTINUM/OTHER: Stable cardiomediastinal silhouette. MVR. Surgical clips in right axilla.     IMPRESSION: ET tube 2 cm above leo. NG tube tip in proximal stomach. Probable right lower lobe atelectasis; attention on follow-up.    X-RAY CHEST 1 VIEW    Result Date: 12/26/2023  CLINICAL HISTORY: Postop; extubation     IMPRESSION: Status post extubation. Slightly improved vascular congestion since earlier in the day. Suspect developing atelectasis or airspace disease in the right midlung zone. COMMENT: Semierect AP portable view of the chest was performed at 1428 and is compared to a prior study from 5:27 AM. In the interval, the endotracheal tube has been removed. An enteric tube remains in place with the tip in the stomach. There is a vascular catheter/line with the tip projecting near the junction of the IVC and right atrium; it is difficult to determine whether this was present previously but differences in technique. Mild cardiomegaly is again noted. Cardiac valvular prosthesis is again seen. The pulmonary vasculature and interstitial markings have improved slightly since earlier in the day. There is questionable developing airspace disease or atelectasis in the right midlung zone. Trace bilateral pleural effusions are suspected. The hilar and mediastinal structures appear stable. Surgical clips are again seen in the right axilla.    X-RAY CHEST 1 VIEW    Result Date: 12/26/2023  CLINICAL HISTORY: 72-year-old, follow-up TECHNIQUE: 1 view of the chest acquired. COMPARISON: 12/25/2023 COMMENT: LINES: ET tube 4 cm above leo. NG tube tip not well  seen secondary to underpenetration, likely in proximal stomach. LUNGS: Low lung volumes with crowding of interstitial markings. Stable compared to previous examination without focal airspace consolidation or pulmonary edema. MEDIASTINUM/OTHER: Cardiomegaly. Mitral valve replacement.     IMPRESSION: ET tube 4 cm above leo, NG tube tip not well seen, likely in proximal stomach. Stable cardiomegaly, mitral valve replacement. Clear lungs, with interstitial crowding secondary to low lung volumes.    Transthoracic echo (TTE) complete    Result Date: 12/26/2023  •  Left Ventricle: Normal ventricle size. Normal wall thickness. Preserved systolic function. Estimated EF 40-45%. Hypokinesis of the apex. Possible Takotsubos CMO pattern, No LV apical thrombus with definity Grade III diastolic dysfunction. •  Right Ventricle: Normal ventricle size. Pacer wire present. Normal systolic function. •  Right Atrium: Normal sized atrium. •  Aortic Valve: Tricuspid valve. Trace regurgitation. No stenosis. •  Mitral Valve: Normal leaflet structure. Normal leaflet motion. Trace regurgitation. No stenosis. •  Tricuspid Valve: Normal structure. Mild regurgitation. Estimated RVSP = 43 mmHg. No significant stenosis. •  Pulmonic Valve: Normal structure. No regurgitation. No stenosis. •  Aorta: Aortic root normal. Sinuses of Valsalva normal-sized. Ascending aorta normal-sized. Aortic arch normal-sized. Descending aorta normal-sized. •  Pericardium: Normal structure. No evidence of pericardial effusion. No cardiac tamponade. •  Left Atrium: Mildly dilated atrium.     X-RAY CHEST 1 VIEW    Result Date: 12/26/2023  CLINICAL HISTORY: 72-year-old, ETT adjustment TECHNIQUE: 1 view of the chest acquired. COMPARISON: Earlier same day COMMENT: LINES: ET tube 3 cm above leo. NG tube tip below inferior edge of film. LUNGS: Improved pulmonary vascular congestion. No pleural effusion. MEDIASTINUM/OTHER: Prominent cardiomediastinal silhouette. Overlying  defibrillator pads and EKG leads. Prosthetic cardiac valve. Surgical clips in right axilla.     IMPRESSION: Improved pulmonary vascular congestion. ET tube 3 cm above leo, NG tube tip below inferior edge of film.    X-RAY CHEST 1 VIEW    Result Date: 12/25/2023  CLINICAL HISTORY: s/p cardiac arrest     IMPRESSION: Interval retraction of the endotracheal tube now in satisfactory position, as below. Stable satisfactory positioning of the enteric tube. Progressive pulmonary interstitial edema with stable enlargement of the cardiac silhouette. No pneumothorax. COMMENT: Comparison: Chest x-ray 12/24/2023. Technique: A single frontal AP portable upright projection of the chest was obtained. FINDINGS: There has been interval retraction of the endotracheal tube now terminating 2.7 cm above the leo. An enteric tube courses to the stomach with the distal tip collimated from the field-of-view beneath the left hemidiaphragm in satisfactory position. There are defibrillator pad projecting over the left hemithorax. There are progressively increased interstitial opacities seen projecting over both lungs. There is no pleural effusion or pneumothorax. The cardiac silhouette is stably enlarged. The mediastinal contours are unchanged. Again noted is a prosthetic mitral valve. The upper abdomen is unremarkable. There is no acute osseous abnormality. Surgical clips overlie the right axillary region.     X-RAY CHEST 1 VIEW    Result Date: 12/25/2023  CLINICAL HISTORY: OTHER     IMPRESSION: Satisfactory positioning of the endotracheal and enteric tubes. No pneumothorax. Stable pulmonary edema and enlargement of the cardiac silhouette. COMMENT: Comparison: Chest x-ray 12/25/2023. Technique: A single frontal AP portable upright projection of the chest was obtained. FINDINGS: The tip of an endotracheal tube terminates 4.0 cm above the leo. An enteric tube courses to the stomach with the distal tip collimated from the field-of-view  beneath the left hemidiaphragm in satisfactory position. There are defibrillator pad projecting over the left hemithorax. There are mildly increased interstitial opacities again seen projecting over both lungs. No pleural effusion or pneumothorax is seen. There is stable enlargement of the cardiac silhouette. Again noted is a prosthetic mitral valve. The mediastinal contours are unchanged. The upper abdomen is unremarkable. There is no acute osseous abnormality. There are surgical clips overlying the right axillary region.     Cardiac catheterization    Result Date: 12/25/2023  •  Angiographically normal epicardial coronary arteries •  Left ventricular ejection fraction is approximately 25-30%.Wall motion abnormalities consistent with Takotsubo Cardiomyopathy •  Severely elevated biventricular filling pressures •  Reduced Cardiac Output and Index; Reduced Stroke volume and stroke volume index by Chelsi Calculation •  Pulmonary venous post-capillary hypertension primarily due to left heart dysfunction •  No peak to peak gradient on pullback to suggest aortic stenosis RECOMMENDATIONS:  1. Routine post-procedure and access site care 2. Optimal medical therapy and primary prevention for CAD 3. IV Milrinone 4. Bedrest     X-RAY CHEST 1 VIEW    Result Date: 12/25/2023  CLINICAL HISTORY: intubated     IMPRESSION: Low-lying endotracheal tube terminating over the proximal right mainstem bronchus. Recommend retraction. Satisfactory positioning of the enteric tube. This finding and recommendation was discussed with nurse Shoemaker at 11:27 AM on 12/25/2023 by Dr. Martinez by telephone. Mild pulmonary interstitial edema and stable enlargement of the cardiac silhouette. No pneumothorax. COMMENT: Comparison: Chest x-ray 12/24/2023. Technique: A single frontal AP portable upright projection of the chest was obtained. FINDINGS: The tip of the intervally placed endotracheal tube terminates over the proximal right mainstem bronchus. The tip  of the enteric tube terminates over the left upper quadrant of the abdomen. There are mildly increased interstitial opacities noted within both lungs. There is no pleural effusion or pneumothorax. There is stable enlargement of the cardiac silhouette. Again noted is a mitral valve prosthesis. Surgical clips overlie the right axilla. The upper abdomen is unremarkable. There is no acute osseous abnormality.    X-RAY CHEST 1 VIEW    Result Date: 12/25/2023  CLINICAL HISTORY: Shortness of breath.     IMPRESSION: Vascular congestion.  Left basal atelectasis. COMMENT: AP radiograph the chest is compared to previous study dated 8/17/2023. There is vascular congestion and small effusions.  Findings may represent mild congestive heart failure.  Cardiac silhouette is unchanged.  Prosthetic valve ring seen.  Surgical staples seen in the right axilla.  There is minimal left basal atelectasis.    CT ANGIOGRAPHY CHEST PULMONARY EMBOLISM WITH IV CONTRAST    Result Date: 12/24/2023  CLINICAL HISTORY: Shortness of breath.  Hypoxia.  Rule out pulmonary embolism. Study: CT angiography of the chest with contrast for PE protocol.  3D MIP and/or volume rendered image reconstruction performed and reviewed. Axial CT images of the chest are obtained from the thoracic inlet to the upper abdomen with IV contrast.  100 cc of Isovue-370 IV contrast were given. CT DOSE:  One or more dose reduction techniques (e.g. automated exposure control, adjustment of the mA and/or kV according to patient size, use of iterative reconstruction technique) utilized for this examination. COMPARISON: CT chest dated 10/19/2022. COMMENT: Left lower lobe infiltrate with surrounding groundglass opacities and additional satellite lesions in the left lower lobe.  Patchy airspace disease in the right middle lobe.  Minimal right lower lobe atelectatic changes.  There is groundglass nodular infiltrates in the right upper lobe.  No pericardial effusion is seen. The trachea  and main bronchial segments are clear. Mild herniation of the right lung between the third and fourth rib possibly postsurgical. No embolus is seen in the main pulmonary artery or proximal segmental pulmonary arteries.  No intrathoracic aortic dissection is visualized.  No mediastinal or hilar lymphadenopathy is seen. The visualized portions of the upper abdomen demonstrates multiple calcified gallstones.  The right heart is opacified with retrograde opacification of the IVC and hepatic veins. Disc osteophyte complex at T11-T12.     IMPRESSION: 1. No pulmonary embolism in the main or proximal segmental pulmonary arteries. 2. Right upper lobe and left lower lobe infiltrate with patchy airspace opacities in the right middle lobe.      Micro:   Microbiology Results     Procedure Component Value Units Date/Time    Sputum culture / smear Expectorated Sputum [[id number]] Collected: 12/25/23 0418    Specimen: Aspirate from Expectorated Sputum Updated: 12/27/23 0845    Narrative:      The following orders were created for panel order Sputum culture / smear Expectorated Sputum.  Procedure                               Abnormality         Status                     ---------                               -----------         ------                     Sputum Gram Stain (Lab O...[[id number]]                      Final result               Sputum Culture (Lab Only...[[id number]]  Normal              Final result                 Please view results for these tests on the individual orders.    Sputum Gram Stain (Lab Only) Expectorated Sputum [[id number]] Collected: 12/25/23 0418    Specimen: Aspirate from Expectorated Sputum Updated: 12/25/23 0956     Gram Stain Result 3+ WBC      No Epithelial Cells Seen      3+ Gram positive bacilli      2+ Gram positive cocci in pairs, chains and clusters    Sputum Culture (Lab Only) Expectorated Sputum [[id number]]  (Normal) Collected: 12/25/23 0418    Specimen: Aspirate from Expectorated Sputum  Updated: 12/27/23 0845     Culture Normal [place]    MRSA Screen, Nares Only Nose [[id number]]  (Normal) Collected: 12/25/23 0352    Specimen: Nasal Swab from Nose Updated: 12/25/23 0906     MRSA DNA, Nares Negative    Blood Culture Blood, Venous [[id number]]  (Normal) Collected: 12/24/23 1652    Specimen: Blood, Venous Updated: 12/27/23 0000     Culture No growth at 48 hours    SARS-CoV-2 (COVID-19) Nasopharynx [[id number]]  (Abnormal) Collected: 12/24/23 1649    Specimen: Nasopharyngeal Swab from Nasopharynx Updated: 12/24/23 1736    Narrative:      The following orders were created for panel order SARS-CoV-2 (COVID-19) Nasopharynx.  Procedure                               Abnormality         Status                     ---------                               -----------         ------                     SARS-COV-2 (COVID-19)/ F...[[id number]]  Abnormal            Final result                 Please view results for these tests on the individual orders.    SARS-COV-2 (COVID-19)/ FLU A/B, AND RSV, PCR Nasopharynx [[id number]]  (Abnormal) Collected: 12/24/23 1649    Specimen: Nasopharyngeal Swab from Nasopharynx Updated: 12/24/23 1736     SARS-CoV-2 (COVID-19) Negative     Influenza A Positive     Influenza B Negative     Respiratory Syncytial Virus Negative    Narrative:      Testing performed using real-time PCR for detection of COVID-19. EUA approved validation studies performed on site.     Blood Culture Blood, Venous [[id number]]  (Normal) Collected: 12/24/23 1647    Specimen: Blood, Venous Updated: 12/27/23 0100     Culture No growth at 48 hours          ECG/Telemetry  I have independently reviewed the telemetry. No events for the last 24 hours.         ASSESSMENT    72 y.o. male with history atrial fibrillation, CHF, prostate and breast CA, history of GI bleed who presented to the emergency room with shortness of breath and found to be influenza A positive. During day 2 of his hospital stay he had a v fib arrest with  ROSC. He was transferred to the ICU for further monitoring     PLAN   # Acute Hypoxic respiratory failure  - Failed Bipap on admission, became bradycardic and was intubated  - Extubated 12/26, however became hypoxic, ?Secondary to upper airway edema vs acute bronchospasm vs flash pulmonary edema  - Treated with lasix 40 mg and solumedrol q 6  - Ultimately required re-intubation 12/26. Anesthesia noted some swelling around cords  - S/p bronchoscopy post re-intubation 12/27 which noted patient 'biting on tube'  - Initially passed SBT 12/30, was extubated to nasal cannula, failed HF and BIPAP and ultimately required re-intubation in the setting of hypoxia and poor mental status  - Completed steroids  - Heavily sedated overnight, sedation stopped this morning. Will continue to monitor for mental status improvement, may need MRI.   - He is volume overloaded, with resolving ELEANOR.  Will dose with lasix 40 mg I today  - Plan for limited echo tomorrow  - Cont PS trials     # Shock  - Improved, remains off of levophed,  maintain MAP > 65    # S/p V fib cardiac arrest  - Likely related to Takosubo's cardiomyopthy, EF 40-45%  - ROSC achieved after receiving 3 epi, 3 shocks, 2 bicarb, and Magnesium replacement  - Following commands post-arrest. No TTM  - Taken by interventional cardiology for the placement of a temporary pacemaker  - Also noted no significant CAD  - PPM removed secondary to dyssynchrony and misplacement. No further marcellus arrythmias, removed 12/27  - Per cardiology. if patient has further episodes of polymorphic ventricular tachycardia, resume IV lidocaine  - Mental status remains poor; possibly delirium vs 2/2 sedatives vs anoxic encephalopathy. Monitor after stopping sedatives, may require MRI if not improving  - CTH 12/30 negative. Will minimize sedation to allow for adequate neurological exam     # Takotsubo Cardiomyopathy  - TTE 12/26 suggestive of Takotsubos with EF 40-45%, confirmed by LV gram   - Off  milrinone 12/29  - Will repeat TTE tomorrow to reevaluate  - Cardiology following    # Acute kidney injury  - Renal function slowly improving. Initial creatinine 1.2, peaked 2.3, now stable at 1.8  - Baseline creatinine appears to be 1.2-1.4  - ?Secondary to IV dye load s/p cardiac cath vs over-diuresis   - Monitor BMP  - Cr trending down  - Dosing with lasix today as above    # Elevated liver enzymes  - Likely in setting of hypotension, shock liver  - Serial labs, trending down.  - Monitor CMP     # Pneumonia  - CT Chest 12/24 with left lower lobe infiltrate and right upper lobe infiltrate  - Blood/ sputum cultures negative   - Completed course of zosyn yesterday, 12/31.   - Febrile again overnight, repeat Bcx, sputum cx pending  - ID following    # Afib  - AF on telemetry, rate controlled  - EP following  - Continue IV heparin  - Continue amiodarone     # Influenza A  - Completed 5 day course Oseltamivir     # Prostate/breast cancer  - S/p radiation therapy for prostate cancer spring 2023  - S/p R mastectomy 8/2023 and radiation therapy 9/2023  - Continue tamoxifen      VTE Prophylaxis Plan: SCDs, SQH  GI Prophylaxis Plan: Protonix  Lines/Drains/Airway: R PICC (12/28), DHT, jones, FMS, ETT  Fluids/Electrolytes/Nutrition: TF     Disposition Planning: Remain in the ICU    CODE STATUS  Full Code       The case was reviewed this morning at the patient's beside multidisciplinary rounds with the patient's nurse, dietician, pharmacist, respiratory therapist, physical therapist and critical care nurse coordinator. The patient's clinical status with regards to diagnosis, treatment plans including disposition of any IV, arterial lines, Jones and tubes were discussed, as well as dietary, respiratory therapy and mobilization needs.     This case was discussed with consultants.    Total critical time spent managing acute hypoxic respiratory failure, VF arrest, atrial fibrillation, totals 40 minutes.      This note was scribed  by Jennifer Canales PA-C, in my presence and on my behalf.      Megan Rico, DO         Yes

## 2024-02-19 ENCOUNTER — NON-APPOINTMENT (OUTPATIENT)
Age: 74
End: 2024-02-19

## 2024-05-21 ENCOUNTER — APPOINTMENT (OUTPATIENT)
Dept: ORTHOPEDIC SURGERY | Facility: CLINIC | Age: 74
End: 2024-05-21

## 2024-06-21 ENCOUNTER — APPOINTMENT (OUTPATIENT)
Dept: ORTHOPEDIC SURGERY | Facility: CLINIC | Age: 74
End: 2024-06-21
Payer: MEDICARE

## 2024-06-21 VITALS — HEIGHT: 65 IN | BODY MASS INDEX: 22.33 KG/M2 | WEIGHT: 134 LBS

## 2024-06-21 DIAGNOSIS — R22.32 LOCALIZED SWELLING, MASS AND LUMP, LEFT UPPER LIMB: ICD-10-CM

## 2024-06-21 DIAGNOSIS — M65.312 TRIGGER THUMB, LEFT THUMB: ICD-10-CM

## 2024-06-21 DIAGNOSIS — M18.11 UNILATERAL PRIMARY OSTEOARTHRITIS OF FIRST CARPOMETACARPAL JOINT, RIGHT HAND: ICD-10-CM

## 2024-06-21 PROCEDURE — 20605 DRAIN/INJ JOINT/BURSA W/O US: CPT | Mod: RT

## 2024-06-21 PROCEDURE — 99203 OFFICE O/P NEW LOW 30 MIN: CPT | Mod: 25

## 2024-06-21 PROCEDURE — 20550 NJX 1 TENDON SHEATH/LIGAMENT: CPT | Mod: LT

## 2024-06-21 NOTE — PROCEDURE
[FreeTextEntry3] :   Medium joint injection was performed of the right CMC joint. The indication for this procedure was pain and inflammation. The site was prepped with alcohol, ethyl chloride sprayed topically, and sterile technique used. An injection of Lidocaine 0.5cc of 1%, Triamcinolone (Kenalog) 0.5cc of 10 mg was used.  Patient tolerated procedure well.   The risks benefits, and alternatives have been discussed, and verbal consent was obtained.   Thumb CMC injection was performed because of pain inflammation and stiffness. Patient has tried OTC's including aspirin, Ibuprofen, Aleve etc. or prescription NSAIDS, and/or exercises at home and/ or physical therapy without satisfactory response. After verbal consent using sterile preparation and technique. The risks, benefits, and alternatives to cortisone injection were explained in full to the patient. Risks outlined include but are not limited to infection, sepsis, bleeding, scarring, skin. discoloration, temporary increase in pain, syncopal episode, failure to resolve symptoms, allergic reaction, symptom recurrence, and elevation of blood sugar in diabetics. Patient understood the risks. All questions were answered. After discussion of options, patient requested an injection. Oral informed consent was obtained, and sterile prep was done of the injection site. Sterile technique was utilized for the procedure including the preparation of the solutions used for the injection. Patient tolerated the procedure well. Advised to ice the injection site this evening. Sterile technique used.  Tendon sheath was performed of the left thumb trigger finger. The indication for this procedure was pain and inflammation. The site was prepped with alcohol, ethyl chloride sprayed topically, and sterile technique used. An injection of Lidocaine 0.5cc of 1%, Triamcinolone (Kenalog) 0.5cc of 10 mg was used. Patient tolerated procedure well.   The risks benefits, and alternatives have been discussed, and verbal consent was obtained.   The risks, benefits, alternatives, and contents of the injection have been discussed.  Risks include but are not limited to allergic reaction, flare reaction, bruising, injection site pain, numbness, increased blood sugar, permanent white skin discoloration at the injection site and infection.  The patient understands the risks and agrees to having the injection.  All questions have been answered. Patient agrees to injection.

## 2024-06-21 NOTE — PHYSICAL EXAM
[Right] : right hand [1st] : 1st [CMC Joint] : CMC joint [Bilateral] : hand bilaterally [FreeTextEntry1] : OC 11/6/2024: b/l stage 3 first CMC arthritis. mild MP and IP degenertiave changes left thumb

## 2024-06-21 NOTE — DISCUSSION/SUMMARY
[de-identified] : Discussed the nature of the diagnosis and risk and benefits of different modalities of treatment. b/l hand x-rays form 11/2023 reviewed and discussed. RT CMC arthritis, LT trigger thumb and finger mass (consider mucous cyst).  Discussed conservative management as symptoms demand vs CSI. Pt would like a CSI for the RT CMC and LT trigger thumb. Done today and tolerated well. All questions answered.

## 2024-06-21 NOTE — HISTORY OF PRESENT ILLNESS
[de-identified] : 73 year old female presenting with a LT thumb, volar IP joint. Also with LEFT thumb pain and locking. She is also with pain at the base of the RT thumb.  [FreeTextEntry1] : b/l hands [FreeTextEntry5] : b/l hands pain that developed a while ago

## 2024-07-01 NOTE — ED ADULT TRIAGE NOTE - CCCP TRG CHIEF CMPLNT
Quality 226: Preventive Care And Screening: Tobacco Use: Screening And Cessation Intervention: Patient screened for tobacco use and is an ex/non-smoker Detail Level: Detailed abdominal pain

## 2024-07-30 ENCOUNTER — APPOINTMENT (OUTPATIENT)
Dept: CARDIOLOGY | Facility: CLINIC | Age: 74
End: 2024-07-30
Payer: MEDICARE

## 2024-07-30 DIAGNOSIS — H53.129 TRANSIENT VISUAL LOSS, UNSPECIFIED EYE: ICD-10-CM

## 2024-07-30 DIAGNOSIS — H54.7 UNSPECIFIED VISUAL LOSS: ICD-10-CM

## 2024-07-30 PROCEDURE — 99214 OFFICE O/P EST MOD 30 MIN: CPT

## 2024-07-30 NOTE — PHYSICAL EXAM
[Normal Oral Mucosa] : normal oral mucosa [No Oral Pallor] : no oral pallor [No Oral Cyanosis] : no oral cyanosis [Normal Jugular Venous A Waves Present] : normal jugular venous A waves present [Normal Jugular Venous V Waves Present] : normal jugular venous V waves present [No Jugular Venous Ovalle A Waves] : no jugular venous ovalle A waves [Heart Rate And Rhythm] : heart rate and rhythm were normal [Heart Sounds] : normal S1 and S2 [Murmurs] : no murmurs present [] : no respiratory distress [Respiration, Rhythm And Depth] : normal respiratory rhythm and effort [Exaggerated Use Of Accessory Muscles For Inspiration] : no accessory muscle use [Auscultation Breath Sounds / Voice Sounds] : lungs were clear to auscultation bilaterally

## 2024-07-30 NOTE — REASON FOR VISIT
[Initial Evaluation] : an initial evaluation of [FreeTextEntry2] : vascular evaluation [FreeTextEntry1] : 7/30/2024  72 y/o F Ex-Smoker with PMHX Back Pain, Arthritis, GERD, Hiatal Hernia, Iron Deficiency Anemia, HLD, who presents for evaluation.  3 years ago reports vision loss in her eye, unclear which side seen by ophtho  carotid duplex showed 17% R and 19% left, R ICA was tortuous  She is non-compliant with statin therapy  Now on pravastatin and aspirin 81mg daily     Former smoker quit 40 years ago

## 2024-07-30 NOTE — ASSESSMENT
[FreeTextEntry1] : Assessment: 1  Transient vision loss ?  amaurosis 2.  Carotid athersclerosis, Mild 3.  Hyperlipidemia  4.  Former smoker 5.   Tortuous R ICA  Plan 1.  Enforce compliance with statin and aspirin 2.  Repeat lipid panel in 2 months 3.  Her children should have their lipid panels checked 4.  Will get CTA head and neck given possible amaurosis and carotid tortuosity on the right 5.  RTC in 3 months

## 2024-08-02 ENCOUNTER — APPOINTMENT (OUTPATIENT)
Dept: ORTHOPEDIC SURGERY | Facility: CLINIC | Age: 74
End: 2024-08-02

## 2024-08-06 ENCOUNTER — APPOINTMENT (OUTPATIENT)
Dept: ORTHOPEDIC SURGERY | Facility: CLINIC | Age: 74
End: 2024-08-06

## 2024-08-21 ENCOUNTER — APPOINTMENT (OUTPATIENT)
Dept: CT IMAGING | Facility: CLINIC | Age: 74
End: 2024-08-21
Payer: MEDICARE

## 2024-08-21 PROCEDURE — 70498 CT ANGIOGRAPHY NECK: CPT

## 2024-08-21 PROCEDURE — 70496 CT ANGIOGRAPHY HEAD: CPT

## 2024-10-22 ENCOUNTER — APPOINTMENT (OUTPATIENT)
Dept: CARDIOLOGY | Facility: CLINIC | Age: 74
End: 2024-10-22
Payer: MEDICARE

## 2024-10-22 DIAGNOSIS — H54.7 UNSPECIFIED VISUAL LOSS: ICD-10-CM

## 2024-10-22 PROCEDURE — G2211 COMPLEX E/M VISIT ADD ON: CPT

## 2024-10-22 PROCEDURE — 99214 OFFICE O/P EST MOD 30 MIN: CPT

## 2025-01-03 ENCOUNTER — APPOINTMENT (OUTPATIENT)
Dept: ORTHOPEDIC SURGERY | Facility: CLINIC | Age: 75
End: 2025-01-03
Payer: MEDICARE

## 2025-01-03 DIAGNOSIS — M54.16 RADICULOPATHY, LUMBAR REGION: ICD-10-CM

## 2025-01-03 DIAGNOSIS — M17.12 UNILATERAL PRIMARY OSTEOARTHRITIS, LEFT KNEE: ICD-10-CM

## 2025-01-03 PROCEDURE — 99214 OFFICE O/P EST MOD 30 MIN: CPT | Mod: 25

## 2025-01-03 PROCEDURE — 72100 X-RAY EXAM L-S SPINE 2/3 VWS: CPT

## 2025-01-03 PROCEDURE — 73562 X-RAY EXAM OF KNEE 3: CPT | Mod: LT

## 2025-01-03 PROCEDURE — 72170 X-RAY EXAM OF PELVIS: CPT

## 2025-01-03 PROCEDURE — 20610 DRAIN/INJ JOINT/BURSA W/O US: CPT | Mod: LT

## 2025-01-24 ENCOUNTER — APPOINTMENT (OUTPATIENT)
Dept: ORTHOPEDIC SURGERY | Facility: CLINIC | Age: 75
End: 2025-01-24

## 2025-03-06 ENCOUNTER — APPOINTMENT (OUTPATIENT)
Dept: ORTHOPEDIC SURGERY | Facility: CLINIC | Age: 75
End: 2025-03-06
Payer: MEDICARE

## 2025-03-06 ENCOUNTER — APPOINTMENT (OUTPATIENT)
Dept: ORTHOPEDIC SURGERY | Facility: CLINIC | Age: 75
End: 2025-03-06

## 2025-03-06 VITALS — HEIGHT: 65 IN | BODY MASS INDEX: 22.33 KG/M2 | WEIGHT: 134 LBS

## 2025-03-06 DIAGNOSIS — M70.62 TROCHANTERIC BURSITIS, LEFT HIP: ICD-10-CM

## 2025-03-06 PROCEDURE — 99213 OFFICE O/P EST LOW 20 MIN: CPT

## 2025-03-06 RX ORDER — CELECOXIB 200 MG/1
200 CAPSULE ORAL TWICE DAILY
Qty: 60 | Refills: 1 | Status: ACTIVE | COMMUNITY
Start: 2025-03-06 | End: 1900-01-01

## 2025-03-07 ENCOUNTER — RX CHANGE (OUTPATIENT)
Age: 75
End: 2025-03-07

## 2025-03-14 ENCOUNTER — RX CHANGE (OUTPATIENT)
Age: 75
End: 2025-03-14

## 2025-03-25 ENCOUNTER — APPOINTMENT (OUTPATIENT)
Dept: ORTHOPEDIC SURGERY | Facility: CLINIC | Age: 75
End: 2025-03-25

## 2025-04-07 ENCOUNTER — APPOINTMENT (OUTPATIENT)
Dept: MRI IMAGING | Facility: CLINIC | Age: 75
End: 2025-04-07
Payer: MEDICARE

## 2025-04-07 ENCOUNTER — APPOINTMENT (OUTPATIENT)
Dept: ORTHOPEDIC SURGERY | Facility: CLINIC | Age: 75
End: 2025-04-07
Payer: MEDICARE

## 2025-04-07 PROCEDURE — 72148 MRI LUMBAR SPINE W/O DYE: CPT

## 2025-04-07 PROCEDURE — 20610 DRAIN/INJ JOINT/BURSA W/O US: CPT | Mod: LT

## 2025-04-07 PROCEDURE — 99213 OFFICE O/P EST LOW 20 MIN: CPT | Mod: 25

## 2025-04-14 ENCOUNTER — APPOINTMENT (OUTPATIENT)
Dept: ORTHOPEDIC SURGERY | Facility: CLINIC | Age: 75
End: 2025-04-14
Payer: MEDICARE

## 2025-04-14 VITALS — WEIGHT: 134 LBS | BODY MASS INDEX: 22.33 KG/M2 | HEIGHT: 65 IN

## 2025-04-14 DIAGNOSIS — M48.061 SPINAL STENOSIS, LUMBAR REGION WITHOUT NEUROGENIC CLAUDICATION: ICD-10-CM

## 2025-04-14 PROCEDURE — 99214 OFFICE O/P EST MOD 30 MIN: CPT | Mod: 25

## 2025-04-14 PROCEDURE — 20610 DRAIN/INJ JOINT/BURSA W/O US: CPT | Mod: 50

## 2025-04-17 ENCOUNTER — APPOINTMENT (OUTPATIENT)
Dept: PAIN MANAGEMENT | Facility: CLINIC | Age: 75
End: 2025-04-17
Payer: MEDICARE

## 2025-04-17 VITALS — WEIGHT: 134 LBS | BODY MASS INDEX: 22.33 KG/M2 | HEIGHT: 65 IN

## 2025-04-17 DIAGNOSIS — M54.16 RADICULOPATHY, LUMBAR REGION: ICD-10-CM

## 2025-04-17 DIAGNOSIS — M79.18 MYALGIA, OTHER SITE: ICD-10-CM

## 2025-04-17 PROCEDURE — J3490M: CUSTOM | Mod: NC

## 2025-04-17 PROCEDURE — 20552 NJX 1/MLT TRIGGER POINT 1/2: CPT

## 2025-04-17 PROCEDURE — 99204 OFFICE O/P NEW MOD 45 MIN: CPT | Mod: 25

## 2025-04-20 PROBLEM — M17.11 ARTHRITIS OF RIGHT KNEE: Status: ACTIVE | Noted: 2025-04-14

## 2025-04-21 ENCOUNTER — APPOINTMENT (OUTPATIENT)
Dept: ORTHOPEDIC SURGERY | Facility: CLINIC | Age: 75
End: 2025-04-21
Payer: MEDICARE

## 2025-04-21 DIAGNOSIS — M17.12 UNILATERAL PRIMARY OSTEOARTHRITIS, LEFT KNEE: ICD-10-CM

## 2025-04-21 PROCEDURE — 20610 DRAIN/INJ JOINT/BURSA W/O US: CPT | Mod: 50

## 2025-04-28 ENCOUNTER — APPOINTMENT (OUTPATIENT)
Dept: ORTHOPEDIC SURGERY | Facility: CLINIC | Age: 75
End: 2025-04-28
Payer: MEDICARE

## 2025-04-28 DIAGNOSIS — M17.11 UNILATERAL PRIMARY OSTEOARTHRITIS, RIGHT KNEE: ICD-10-CM

## 2025-04-28 PROCEDURE — 20610 DRAIN/INJ JOINT/BURSA W/O US: CPT | Mod: RT

## 2025-04-28 RX ORDER — METHYLPREDNISOLONE 4 MG/1
4 TABLET ORAL
Qty: 1 | Refills: 0 | Status: ACTIVE | COMMUNITY
Start: 2025-04-28 | End: 1900-01-01

## 2025-05-01 ENCOUNTER — APPOINTMENT (OUTPATIENT)
Dept: PAIN MANAGEMENT | Facility: CLINIC | Age: 75
End: 2025-05-01

## 2025-05-01 DIAGNOSIS — M54.16 RADICULOPATHY, LUMBAR REGION: ICD-10-CM

## 2025-05-15 ENCOUNTER — APPOINTMENT (OUTPATIENT)
Dept: PAIN MANAGEMENT | Facility: CLINIC | Age: 75
End: 2025-05-15

## 2025-05-22 ENCOUNTER — APPOINTMENT (OUTPATIENT)
Dept: PAIN MANAGEMENT | Facility: CLINIC | Age: 75
End: 2025-05-22

## 2025-07-11 ENCOUNTER — EMERGENCY (EMERGENCY)
Facility: HOSPITAL | Age: 75
LOS: 1 days | End: 2025-07-11
Attending: EMERGENCY MEDICINE | Admitting: EMERGENCY MEDICINE
Payer: MEDICARE

## 2025-07-11 VITALS
DIASTOLIC BLOOD PRESSURE: 79 MMHG | RESPIRATION RATE: 18 BRPM | OXYGEN SATURATION: 96 % | WEIGHT: 139.99 LBS | HEIGHT: 61 IN | TEMPERATURE: 98 F | SYSTOLIC BLOOD PRESSURE: 139 MMHG | HEART RATE: 77 BPM

## 2025-07-11 VITALS
DIASTOLIC BLOOD PRESSURE: 71 MMHG | SYSTOLIC BLOOD PRESSURE: 113 MMHG | HEART RATE: 71 BPM | OXYGEN SATURATION: 98 % | RESPIRATION RATE: 18 BRPM

## 2025-07-11 LAB
ALBUMIN SERPL ELPH-MCNC: 3.6 G/DL — SIGNIFICANT CHANGE UP (ref 3.3–5)
ALP SERPL-CCNC: 55 U/L — SIGNIFICANT CHANGE UP (ref 40–120)
ALT FLD-CCNC: 32 U/L — SIGNIFICANT CHANGE UP (ref 12–78)
ANION GAP SERPL CALC-SCNC: 7 MMOL/L — SIGNIFICANT CHANGE UP (ref 5–17)
ANION GAP SERPL CALC-SCNC: 8 MMOL/L — SIGNIFICANT CHANGE UP (ref 5–17)
APPEARANCE UR: CLEAR — SIGNIFICANT CHANGE UP
AST SERPL-CCNC: 19 U/L — SIGNIFICANT CHANGE UP (ref 15–37)
BASOPHILS # BLD AUTO: 0.08 K/UL — SIGNIFICANT CHANGE UP (ref 0–0.2)
BASOPHILS NFR BLD AUTO: 0.7 % — SIGNIFICANT CHANGE UP (ref 0–2)
BILIRUB SERPL-MCNC: 0.2 MG/DL — SIGNIFICANT CHANGE UP (ref 0.2–1.2)
BILIRUB UR-MCNC: NEGATIVE — SIGNIFICANT CHANGE UP
BUN SERPL-MCNC: 18 MG/DL — SIGNIFICANT CHANGE UP (ref 7–23)
BUN SERPL-MCNC: 22 MG/DL — SIGNIFICANT CHANGE UP (ref 7–23)
CALCIUM SERPL-MCNC: 8.4 MG/DL — LOW (ref 8.5–10.1)
CALCIUM SERPL-MCNC: 8.7 MG/DL — SIGNIFICANT CHANGE UP (ref 8.5–10.1)
CHLORIDE SERPL-SCNC: 102 MMOL/L — SIGNIFICANT CHANGE UP (ref 96–108)
CHLORIDE SERPL-SCNC: 93 MMOL/L — LOW (ref 96–108)
CO2 SERPL-SCNC: 27 MMOL/L — SIGNIFICANT CHANGE UP (ref 22–31)
CO2 SERPL-SCNC: 28 MMOL/L — SIGNIFICANT CHANGE UP (ref 22–31)
COLOR SPEC: YELLOW — SIGNIFICANT CHANGE UP
CREAT SERPL-MCNC: 0.49 MG/DL — LOW (ref 0.5–1.3)
CREAT SERPL-MCNC: 0.57 MG/DL — SIGNIFICANT CHANGE UP (ref 0.5–1.3)
DIFF PNL FLD: NEGATIVE — SIGNIFICANT CHANGE UP
EGFR: 95 ML/MIN/1.73M2 — SIGNIFICANT CHANGE UP
EGFR: 95 ML/MIN/1.73M2 — SIGNIFICANT CHANGE UP
EGFR: 98 ML/MIN/1.73M2 — SIGNIFICANT CHANGE UP
EGFR: 98 ML/MIN/1.73M2 — SIGNIFICANT CHANGE UP
EOSINOPHIL # BLD AUTO: 0.18 K/UL — SIGNIFICANT CHANGE UP (ref 0–0.5)
EOSINOPHIL NFR BLD AUTO: 1.6 % — SIGNIFICANT CHANGE UP (ref 0–6)
GLUCOSE SERPL-MCNC: 124 MG/DL — HIGH (ref 70–99)
GLUCOSE SERPL-MCNC: 136 MG/DL — HIGH (ref 70–99)
GLUCOSE UR QL: NEGATIVE MG/DL — SIGNIFICANT CHANGE UP
HCT VFR BLD CALC: 37 % — SIGNIFICANT CHANGE UP (ref 34.5–45)
HGB BLD-MCNC: 12.6 G/DL — SIGNIFICANT CHANGE UP (ref 11.5–15.5)
IMM GRANULOCYTES # BLD AUTO: 0.05 K/UL — SIGNIFICANT CHANGE UP (ref 0–0.07)
IMM GRANULOCYTES NFR BLD AUTO: 0.5 % — SIGNIFICANT CHANGE UP (ref 0–0.9)
KETONES UR QL: NEGATIVE MG/DL — SIGNIFICANT CHANGE UP
LEUKOCYTE ESTERASE UR-ACNC: NEGATIVE — SIGNIFICANT CHANGE UP
LYMPHOCYTES # BLD AUTO: 1.5 K/UL — SIGNIFICANT CHANGE UP (ref 1–3.3)
LYMPHOCYTES NFR BLD AUTO: 13.7 % — SIGNIFICANT CHANGE UP (ref 13–44)
MCHC RBC-ENTMCNC: 29.4 PG — SIGNIFICANT CHANGE UP (ref 27–34)
MCHC RBC-ENTMCNC: 34.1 G/DL — SIGNIFICANT CHANGE UP (ref 32–36)
MCV RBC AUTO: 86.2 FL — SIGNIFICANT CHANGE UP (ref 80–100)
MONOCYTES # BLD AUTO: 0.99 K/UL — HIGH (ref 0–0.9)
MONOCYTES NFR BLD AUTO: 9.1 % — SIGNIFICANT CHANGE UP (ref 2–14)
NEUTROPHILS # BLD AUTO: 8.11 K/UL — HIGH (ref 1.8–7.4)
NEUTROPHILS NFR BLD AUTO: 74.4 % — SIGNIFICANT CHANGE UP (ref 43–77)
NITRITE UR-MCNC: NEGATIVE — SIGNIFICANT CHANGE UP
NRBC # BLD AUTO: 0 K/UL — SIGNIFICANT CHANGE UP (ref 0–0)
NRBC # FLD: 0 K/UL — SIGNIFICANT CHANGE UP (ref 0–0)
NRBC BLD AUTO-RTO: 0 /100 WBCS — SIGNIFICANT CHANGE UP (ref 0–0)
PH UR: 7 — SIGNIFICANT CHANGE UP (ref 5–8)
PLATELET # BLD AUTO: 373 K/UL — SIGNIFICANT CHANGE UP (ref 150–400)
PMV BLD: 8.9 FL — SIGNIFICANT CHANGE UP (ref 7–13)
POTASSIUM SERPL-MCNC: 3.7 MMOL/L — SIGNIFICANT CHANGE UP (ref 3.5–5.3)
POTASSIUM SERPL-MCNC: 3.7 MMOL/L — SIGNIFICANT CHANGE UP (ref 3.5–5.3)
POTASSIUM SERPL-SCNC: 3.7 MMOL/L — SIGNIFICANT CHANGE UP (ref 3.5–5.3)
POTASSIUM SERPL-SCNC: 3.7 MMOL/L — SIGNIFICANT CHANGE UP (ref 3.5–5.3)
PROT SERPL-MCNC: 6.9 G/DL — SIGNIFICANT CHANGE UP (ref 6–8.3)
PROT UR-MCNC: NEGATIVE MG/DL — SIGNIFICANT CHANGE UP
RBC # BLD: 4.29 M/UL — SIGNIFICANT CHANGE UP (ref 3.8–5.2)
RBC # FLD: 12.8 % — SIGNIFICANT CHANGE UP (ref 10.3–14.5)
SODIUM SERPL-SCNC: 129 MMOL/L — LOW (ref 135–145)
SODIUM SERPL-SCNC: 136 MMOL/L — SIGNIFICANT CHANGE UP (ref 135–145)
SP GR SPEC: 1 — SIGNIFICANT CHANGE UP (ref 1–1.03)
UROBILINOGEN FLD QL: 0.2 MG/DL — SIGNIFICANT CHANGE UP (ref 0.2–1)
WBC # BLD: 10.91 K/UL — HIGH (ref 3.8–10.5)
WBC # FLD AUTO: 10.91 K/UL — HIGH (ref 3.8–10.5)

## 2025-07-11 PROCEDURE — 93005 ELECTROCARDIOGRAM TRACING: CPT

## 2025-07-11 PROCEDURE — 87086 URINE CULTURE/COLONY COUNT: CPT

## 2025-07-11 PROCEDURE — 81003 URINALYSIS AUTO W/O SCOPE: CPT

## 2025-07-11 PROCEDURE — 80053 COMPREHEN METABOLIC PANEL: CPT

## 2025-07-11 PROCEDURE — 85025 COMPLETE CBC W/AUTO DIFF WBC: CPT

## 2025-07-11 PROCEDURE — 83935 ASSAY OF URINE OSMOLALITY: CPT

## 2025-07-11 PROCEDURE — 84300 ASSAY OF URINE SODIUM: CPT

## 2025-07-11 PROCEDURE — 93010 ELECTROCARDIOGRAM REPORT: CPT

## 2025-07-11 PROCEDURE — 80048 BASIC METABOLIC PNL TOTAL CA: CPT

## 2025-07-11 PROCEDURE — 99285 EMERGENCY DEPT VISIT HI MDM: CPT | Mod: FS

## 2025-07-11 PROCEDURE — 99283 EMERGENCY DEPT VISIT LOW MDM: CPT | Mod: 25

## 2025-07-11 PROCEDURE — 36415 COLL VENOUS BLD VENIPUNCTURE: CPT

## 2025-07-11 RX ORDER — EZETIMIBE 10 MG/1
0 TABLET ORAL
Refills: 0 | DISCHARGE

## 2025-07-11 RX ORDER — ALPRAZOLAM 0.5 MG
1 TABLET, EXTENDED RELEASE 24 HR ORAL
Refills: 0 | DISCHARGE

## 2025-07-11 RX ORDER — ROSUVASTATIN CALCIUM 5 MG/1
1 TABLET, FILM COATED ORAL
Refills: 0 | DISCHARGE

## 2025-07-11 RX ORDER — ACETAMINOPHEN 500 MG/5ML
650 LIQUID (ML) ORAL ONCE
Refills: 0 | Status: COMPLETED | OUTPATIENT
Start: 2025-07-11 | End: 2025-07-11

## 2025-07-11 RX ADMIN — Medication 1000 MILLILITER(S): at 17:30

## 2025-07-11 RX ADMIN — Medication 650 MILLIGRAM(S): at 20:33

## 2025-07-11 NOTE — ED ADULT NURSE REASSESSMENT NOTE - NS ED NURSE REASSESS COMMENT FT1
1927:  patient doing well.  no distress noted.  daughter in law remains at bedside.  awaiting repeat BNP.  phlebotomy was called and reportedly on the floor and will be coming to draw.    vitals recorded earlier.  lynne irizarry.
1930:  remaining urine collected and sent.  lynne irizarry.
Pt received from ANNALISA Gillis in  by doc box. Pt assisted to ambulate to bathroom and VS as charted. Pt denies any needs, complaints, or concerns at this time. Family at bedside for support. Pt aware of pending lab draw - phlebotomy contacted.

## 2025-07-11 NOTE — ED PROVIDER NOTE - IV ALTEPLASE DOOR HIDDEN
Thank you for coming to the PSY[facility].    Lab Testing:  If you had lab testing today and your results are reassuring or normal they will be mailed to you or sent through [facility] within 7 days.   If the lab tests need quick action we will call you with the results.  The phone number we will call with results is # [phone] (home) . If this is not the best number please call our clinic and change the number.    Medication Refills:  If you need any refills please call your pharmacy and they will contact us. Our fax number for refills is [phone]. Please allow three business for refill processing.   If you need to  your refill at a new pharmacy, please contact the new pharmacy directly. The new pharmacy will help you get your medications transferred.     Scheduling:  If you have any concerns about today's visit or wish to schedule another appointment please call our office during normal business hours [phone] (8-5:00 M-F)    Contact Us:  Please call [phone] during business hours (8-5:00 M-F).  If after clinic hours, or on the weekend, please call  [phone].    Financial Assistance [phone]  [facility] Billing [phone]  [facility] Billing [phone]  Medical Records [phone]      MENTAL HEALTH CRISIS NUMBERS:  [facility]:   [facility] - [phone]   Crisis Residence [facility] - [phone]   Walk-In Counseling [facility] [phone]   COPE 24/7 [place] Mobile Team for Adults - [[phone]]; Child - [[phone]]     Crisis Connection - [phone]     [facility]:   [facility] - [phone]   Walk-in counseling [facility] - [phone]   Walk-in counseling [facility] - [phone]   Crisis Residence [facility] - [phone]   Urgent Care Adult Mental Health:   --Drop-in, 24/7 crisis line, and [place] Mobile Team [[phone]]    CRISIS TEXT  LINE: Text [phone] from anywhere, anytime, any crisis 24/7;    OR SEE www.crisistextline.org     Poison Control Center - [phone]    CHILD: [facility] needs assessment team - [phone]     [facility] - [phone]; or [facility] - [phone]    If you have a medical emergency please call 911or go to the nearest ER.                    _____________________________________________    Again thank you for choosing [facility] and please let us know how we can best partner with you to improve you and your family's health.  You may be receiving a survey in the mail regarding this appointment. We would love to have your feedback, both positive and negative, so please fill out the survey and return it using the provided envelope. The survey is done by an external company, so your answers are anonymous.      show

## 2025-07-11 NOTE — ED PROVIDER NOTE - OBJECTIVE STATEMENT
Patient is a 74-year-old female with past medical history of COPD hyperlipidemia diverticulitis hypertension recently started on hydrochlorothiazide 3 weeks ago.  Patient states she was having anxiety like symptoms with elevated blood pressure so PCP started patient on Xanax as needed at bedtime 2 nights ago.  Patient states she also had blood work that day and received a call today that her sodium was 122 and she did not take her hydrochlorothiazide today.  Patient states otherwise she feels fine no chest pain shortness of breath dizziness weakness numbness tingling.

## 2025-07-11 NOTE — ED ADULT TRIAGE NOTE - CHIEF COMPLAINT QUOTE
pt was sent by PCP for low sodium levels from recent blood work. pt was brought in to PCP for panic attacks from stopping Trazadone as per daughter. Pt is on HTN, HLD medication, diuretic. pt has no c/o numbness/tingling/muscle cramps/HA/CP/SOB/dizziness. Denies ABD Pain . Denies blurry vision. Denies n/v/d. pt feels very anxious.

## 2025-07-11 NOTE — ED ADULT NURSE NOTE - CHPI ED NUR SYMPTOMS NEG
abnormal labs/no chills/no decreased eating/drinking/no dizziness/no fever/no nausea/no pain/no tingling/no vomiting/no weakness

## 2025-07-11 NOTE — ED PROVIDER NOTE - DIFFERENTIAL DIAGNOSIS
Patient's presenting to the emergency room for an hyponatremia likely in the setting of recent hydrochlorothiazide use.  Will obtain screening labs check UA urine lites monitor.  Ultimate clinical disposition will be pending results. Differential Diagnosis

## 2025-07-11 NOTE — ED ADULT NURSE NOTE - OBJECTIVE STATEMENT
the patient presents to the er with reports of a low sodium level (reportedly 122)  she went to her MD because daughter concerned that something was wrong.  daughter states that she was very anxious and hyperventilating.  MD encouraged her to withhold the HCTZ and to take Xanax as needed   at present, the patient is calm, and denies any pain or complaints.  she ambulates with a steady gait and provided urine (very clear)   PA explained the plan of care (blood work, urine, and at that time when results become available, we will know the plan)  the patient expressed her desire to leave, stating she really does not want to stay.  lynne irizarry.

## 2025-07-11 NOTE — ED PROVIDER NOTE - NSFOLLOWUPINSTRUCTIONS_ED_ALL_ED_FT
Stop taking your hydrochlorothiazide  follow-up with cardiology   return to ED for worsening symptoms      Hyponatremia    WHAT YOU NEED TO KNOW:    Hyponatremia occurs when the amount of sodium (salt) in your blood is lower than normal. Sodium is an electrolyte (mineral) that helps your muscles, heart, and digestive system work properly. It helps control blood pressure and fluid balance.    WHILE YOU ARE HERE:    Informed consent is a legal document that explains the tests, treatments, or procedures that you may need. Informed consent means you understand what will be done and can make decisions about what you want. You give your permission when you sign the consent form. You can have someone sign this form for you if you are not able to sign it. You have the right to understand your medical care in words you know. Before you sign the consent form, understand the risks and benefits of what will be done. Make sure all your questions are answered.    An IV is a small tube placed in your vein that is used to give you medicine or liquids.    Telemetry is continuous monitoring of your heart rhythm. Sticky pads placed on your skin connect to an EKG machine that records your heart rhythm.    Nutrition: A dietitian may talk to you about foods you should eat to increase the amount of sodium in your body. The dietitian may also plan a diet for you if you have other diseases, such as kidney or liver disease.    Liquids: Follow your healthcare provider's advice if you must limit the amount of liquid you drink. Healthcare providers may limit the amount of plain water you drink if you are retaining water. Plain water can lower the sodium in your blood and make your hyponatremia worse. You may be given liquids that have water, sugar, and salt, such as juices, milk, or sports drinks.    Medicines:    Anticonvulsant medicine is given to control seizures.    Antinausea medicine helps calm your stomach and prevents vomiting.    Diuretics help get rid of extra fluid in your body. You may urinate more often when taking diuretics.    Sodium-retaining medicines help get rid of extra fluid in your body while sodium stays inside your body.  Tests:    Blood tests will be done to check the level of sodium in your blood. They may also be done to find the cause of your hyponatremia.    Urine sodium is a test that checks the level of sodium in your urine. A sample of your urine is collected and is sent to a lab for tests.    A chest x-ray is used to check your heart and lung function. Healthcare providers may use the x-ray to look for the cause of your hyponatremia.  Treatment: Dialysis may be needed if medicines cannot decrease extra water in your body and your kidneys are not working well.    RISKS:    Without treatment, hyponatremia may cause serious problems, such as brain swelling, heart problems, or a coma. These problems can be life-threatening.    CARE AGREEMENT:    You have the right to help plan your care. Learn about your health condition and how it may be treated. Discuss treatment options with your healthcare providers to decide what care you want to receive. You always have the right to refuse treatment.

## 2025-07-11 NOTE — ED PROVIDER NOTE - PROGRESS NOTE DETAILS
Repeat sodium 136 spoke with Dr. James Guevara covering [facility] advised to hold hydrochlorothiazide follow-up with cardiology return precautions given

## 2025-07-11 NOTE — ED PROVIDER NOTE - CLINICAL SUMMARY MEDICAL DECISION MAKING FREE TEXT BOX
Patient is a 74-year-old female who presents to the emergency room with a chief complaint of hyponatremia.  Past medical history of back pain arthritis COPD diverticulitis GERD hiatal hernia iron deficiency anemia hyperlipidemia and hypertension.  Patient was recently started on hydrochlorothiazide 3 weeks ago and she also takes losartan.  Reports that she was having some increased anxiety with difficulty sleeping and shortness of breath at night.  She was seen by her primary started on Xanax 2 nights ago.  At that time they did screening labs and today she received a call noting that her sodium was 122.  She was told not to take her hydrochlorothiazide today but reports that she had already taken it.  She otherwise feels fine shortness of breath and anxiety is controlled with her Xanax.  Denies any fevers chills nausea vomiting chest pain or abdominal pain.  No peripheral edema.  On exam patient is lying in bed no acute distress normocephalic atraumatic pupils equal round and reactive heart is regular rate lungs clear to auscultation abdomen is soft nontender nondistended.  Patient's presenting to the emergency room for an hyponatremia likely in the setting of recent hydrochlorothiazide use.  Will obtain screening labs check UA urine lites monitor.  Ultimate clinical disposition will be pending results.

## 2025-07-11 NOTE — ED PROVIDER NOTE - PATIENT PORTAL LINK FT
You can access the FollowMyHealth Patient Portal offered by [facility] by registering at the following website: [email or web address]. By joining [facility] [employer]’s FollowMyHealth portal, you will also be able to view your health information using other applications (apps) compatible with our system.

## 2025-07-12 LAB
OSMOLALITY UR: 168 MOSM/KG — SIGNIFICANT CHANGE UP (ref 50–1200)
SODIUM UR-SCNC: 23 MMOL/L — SIGNIFICANT CHANGE UP

## 2025-07-13 LAB
CULTURE RESULTS: SIGNIFICANT CHANGE UP
SPECIMEN SOURCE: SIGNIFICANT CHANGE UP

## (undated) DEVICE — TROCAR COVIDIEN VERSAPORT BLADELESS OPTICAL 5MM STANDARD

## (undated) DEVICE — TUBING STRYKER PNEUMOSURE HI FLOW INSUFFLATOR

## (undated) DEVICE — DRAPE TOWEL BLUE 17" X 24"

## (undated) DEVICE — GLV 7.5 PROTEXIS (WHITE)

## (undated) DEVICE — TUBING SUCTION 20FT

## (undated) DEVICE — SOL IRR POUR NS 0.9% 1000ML

## (undated) DEVICE — Device

## (undated) DEVICE — PLV-STRYKER LAPAROSCOPE 10MM 30 DEGREE: Type: DURABLE MEDICAL EQUIPMENT

## (undated) DEVICE — SUT SOFSILK 3-0 30" V-20

## (undated) DEVICE — SUCTION YANKAUER TAPERED BULBOUS NO VENT

## (undated) DEVICE — FOLEY CATH 3-WAY 20FR 30CC LATEX LUBRICATH

## (undated) DEVICE — PLV-SCD MACHINE: Type: DURABLE MEDICAL EQUIPMENT

## (undated) DEVICE — FOLEY TRAY 16FR LF URINE METER SURESTEP

## (undated) DEVICE — SUT POLYSORB 2-0 30" V-20 UNDYED

## (undated) DEVICE — GELPORT LAPAROSCOPIC SYSTEM

## (undated) DEVICE — BIOSEL SIGMOIDOSCOPE KIT DISP

## (undated) DEVICE — D HELP - CLEARVIEW CLEARIFY SYSTEM

## (undated) DEVICE — GOWN SMARTGOWN RAGLAN XLG

## (undated) DEVICE — BLADE SCALPEL SAFETYLOCK #15

## (undated) DEVICE — STAPLER SKIN PROXIMATE

## (undated) DEVICE — DRAPE LEGGINGS 6" CUFF 28X43"

## (undated) DEVICE — VENODYNE/SCD SLEEVE CALF LARGE

## (undated) DEVICE — LIGASURE L-HOOK 44CM

## (undated) DEVICE — ELCTR BOVIE PENCIL BLADE 10FT

## (undated) DEVICE — SUT PDS II 1 48" TP-1

## (undated) DEVICE — STAPLER SKIN VISI-STAT 35 WIDE

## (undated) DEVICE — GOWN LG

## (undated) DEVICE — ELCTR BOVIE TIP BLADE INSULATED 2.75" EDGE

## (undated) DEVICE — TUBING STRYKEFLOW II SUCTION / IRRIGATOR

## (undated) DEVICE — WARMING BLANKET UPPER ADULT

## (undated) DEVICE — TROCAR COVIDIEN BLUNT TIP HASSAN 10MM

## (undated) DEVICE — SUT POLYSORB 3-0 30" V-20 UNDYED

## (undated) DEVICE — SYR ASEPTO

## (undated) DEVICE — PACK GENERAL LAPAROSCOPY

## (undated) DEVICE — VENODYNE/SCD SLEEVE CALF MEDIUM

## (undated) DEVICE — TROCAR COVIDIEN VERSAONE FIXATION CANNULA 5MM

## (undated) DEVICE — SUT POLYSORB 2-0 60" REEL

## (undated) DEVICE — PACK MINOR

## (undated) DEVICE — SUT BIOSYN 4-0 27" P-12

## (undated) DEVICE — PLV/PSP-SUCTION IRRIGATOR STRYKER: Type: DURABLE MEDICAL EQUIPMENT

## (undated) DEVICE — STAPLER COVIDIEN ENDO GIA STANDARD HANDLE

## (undated) DEVICE — SUT MONOCRYL 4-0 27" PS-2 UNDYED

## (undated) DEVICE — SOL IRR BAG NS 0.9% 3000ML

## (undated) DEVICE — TROCAR COVIDIEN VERSAPORT BLADELESS OPTICAL 12MM STANDARD